# Patient Record
Sex: MALE | Race: WHITE | Employment: UNEMPLOYED | URBAN - METROPOLITAN AREA
[De-identification: names, ages, dates, MRNs, and addresses within clinical notes are randomized per-mention and may not be internally consistent; named-entity substitution may affect disease eponyms.]

---

## 2021-10-31 ENCOUNTER — APPOINTMENT (OUTPATIENT)
Dept: CT IMAGING | Age: 66
End: 2021-10-31

## 2021-10-31 ENCOUNTER — HOSPITAL ENCOUNTER (EMERGENCY)
Age: 66
Discharge: HOME | End: 2021-10-31
Attending: EMERGENCY MEDICINE
Payer: OTHER GOVERNMENT

## 2021-10-31 VITALS
HEART RATE: 73 BPM | OXYGEN SATURATION: 95 % | TEMPERATURE: 97.9 F | SYSTOLIC BLOOD PRESSURE: 123 MMHG | RESPIRATION RATE: 15 BRPM | WEIGHT: 185 LBS | HEIGHT: 70 IN | DIASTOLIC BLOOD PRESSURE: 73 MMHG | BODY MASS INDEX: 26.48 KG/M2

## 2021-10-31 DIAGNOSIS — I67.1 CEREBRAL ANEURYSM: ICD-10-CM

## 2021-10-31 DIAGNOSIS — H53.2 DIPLOPIA: Primary | ICD-10-CM

## 2021-10-31 LAB
A/G RATIO: 1 (ref 1.1–2.2)
ALBUMIN SERPL-MCNC: 3.8 G/DL (ref 3.4–5)
ALP BLD-CCNC: 70 U/L (ref 40–129)
ALT SERPL-CCNC: 20 U/L (ref 10–40)
ANION GAP SERPL CALCULATED.3IONS-SCNC: 11 MMOL/L (ref 3–16)
AST SERPL-CCNC: 26 U/L (ref 15–37)
BASOPHILS ABSOLUTE: 0 K/UL (ref 0–0.2)
BASOPHILS RELATIVE PERCENT: 0.4 %
BILIRUB SERPL-MCNC: <0.2 MG/DL (ref 0–1)
BUN BLDV-MCNC: 17 MG/DL (ref 7–20)
CALCIUM SERPL-MCNC: 9.4 MG/DL (ref 8.3–10.6)
CHLORIDE BLD-SCNC: 99 MMOL/L (ref 99–110)
CO2: 26 MMOL/L (ref 21–32)
CREAT SERPL-MCNC: 0.9 MG/DL (ref 0.8–1.3)
EOSINOPHILS ABSOLUTE: 0.1 K/UL (ref 0–0.6)
EOSINOPHILS RELATIVE PERCENT: 1.2 %
GFR AFRICAN AMERICAN: >60
GFR NON-AFRICAN AMERICAN: >60
GLUCOSE BLD-MCNC: 92 MG/DL (ref 70–99)
HCT VFR BLD CALC: 47.6 % (ref 40.5–52.5)
HEMOGLOBIN: 15.8 G/DL (ref 13.5–17.5)
LYMPHOCYTES ABSOLUTE: 2.2 K/UL (ref 1–5.1)
LYMPHOCYTES RELATIVE PERCENT: 18.7 %
MCH RBC QN AUTO: 30.5 PG (ref 26–34)
MCHC RBC AUTO-ENTMCNC: 33.1 G/DL (ref 31–36)
MCV RBC AUTO: 92.2 FL (ref 80–100)
MONOCYTES ABSOLUTE: 0.7 K/UL (ref 0–1.3)
MONOCYTES RELATIVE PERCENT: 6.4 %
NEUTROPHILS ABSOLUTE: 8.5 K/UL (ref 1.7–7.7)
NEUTROPHILS RELATIVE PERCENT: 73.3 %
PDW BLD-RTO: 13.7 % (ref 12.4–15.4)
PLATELET # BLD: 197 K/UL (ref 135–450)
PMV BLD AUTO: 6.9 FL (ref 5–10.5)
POTASSIUM REFLEX MAGNESIUM: 5 MMOL/L (ref 3.5–5.1)
RBC # BLD: 5.16 M/UL (ref 4.2–5.9)
REASON FOR REJECTION: NORMAL
REJECTED TEST: NORMAL
SODIUM BLD-SCNC: 136 MMOL/L (ref 136–145)
TOTAL PROTEIN: 7.5 G/DL (ref 6.4–8.2)
WBC # BLD: 11.6 K/UL (ref 4–11)

## 2021-10-31 PROCEDURE — 85025 COMPLETE CBC W/AUTO DIFF WBC: CPT

## 2021-10-31 PROCEDURE — 6360000004 HC RX CONTRAST MEDICATION: Performed by: EMERGENCY MEDICINE

## 2021-10-31 PROCEDURE — 99283 EMERGENCY DEPT VISIT LOW MDM: CPT

## 2021-10-31 PROCEDURE — 70450 CT HEAD/BRAIN W/O DYE: CPT

## 2021-10-31 PROCEDURE — 93005 ELECTROCARDIOGRAM TRACING: CPT | Performed by: EMERGENCY MEDICINE

## 2021-10-31 PROCEDURE — 70496 CT ANGIOGRAPHY HEAD: CPT

## 2021-10-31 PROCEDURE — 80053 COMPREHEN METABOLIC PANEL: CPT

## 2021-10-31 RX ORDER — ASPIRIN 81 MG/1
81 TABLET ORAL DAILY
Qty: 30 TABLET | Refills: 0 | Status: SHIPPED | OUTPATIENT
Start: 2021-10-31

## 2021-10-31 RX ORDER — GABAPENTIN 600 MG/1
TABLET ORAL
COMMUNITY
Start: 2021-07-19 | End: 2022-07-20

## 2021-10-31 RX ORDER — OXYCODONE HYDROCHLORIDE 10 MG/1
TABLET ORAL
COMMUNITY
Start: 2021-10-28

## 2021-10-31 RX ORDER — NALOXONE HYDROCHLORIDE 4 MG/.1ML
SPRAY NASAL
Status: ON HOLD | COMMUNITY
Start: 2021-01-20 | End: 2022-01-28 | Stop reason: HOSPADM

## 2021-10-31 RX ORDER — ALBUTEROL SULFATE 90 UG/1
AEROSOL, METERED RESPIRATORY (INHALATION)
COMMUNITY
Start: 2020-11-30

## 2021-10-31 RX ADMIN — IOPAMIDOL 75 ML: 755 INJECTION, SOLUTION INTRAVENOUS at 19:34

## 2021-10-31 ASSESSMENT — PAIN SCALES - GENERAL: PAINLEVEL_OUTOF10: 4

## 2021-10-31 ASSESSMENT — PAIN DESCRIPTION - PAIN TYPE: TYPE: ACUTE PAIN

## 2021-10-31 ASSESSMENT — VISUAL ACUITY
OU: 20/20
OD: 20/40
OS: 20/20

## 2021-10-31 ASSESSMENT — PAIN DESCRIPTION - LOCATION: LOCATION: HEAD

## 2021-10-31 NOTE — ED PROVIDER NOTES
[facility]  ED PROVIDER NOTE    Patient Identification  Pt Name: Magdy Sue  MRN: [id number]  Jaimee 1955  Date of evaluation: 10/31/2021  Provider: Liam Frederick MD  PCP: No primary care provider on file. Chief Complaint  Double vision    HPI  History provided by patient   This is a 78 y.o. male who presents to the ED for double vision for 1 week. He has been using an eye patch on either eye because if he does not causes him to become dizzy. When he uses 1 eye, his vision is normal.  Does not typically use corrective lenses unless he is reading. No nausea or vomiting. No trauma. Not anticoagulated. No numbness or tingling. Ambulating without issue. Never had this before. Presents today because he thought that it would have gone away by now. No pain at this time. ROS  12 systems reviewed, pertinent positives/negatives per HPI otherwise noted to be negative. I have reviewed the following nursing documentation:  Allergies: Hydromorphone, Morphine, and Ciprofloxacin    Past medical history:   Past Medical History:   Diagnosis Date    COPD (chronic obstructive pulmonary disease) ([facility] [address]      Past surgical history: History reviewed. No pertinent surgical history. Home medications:   Previous Medications    ALBUTEROL SULFATE  (90 BASE) MCG/ACT INHALER    INHALE 2 PUFFS BY ORAL INHALATION EVERY 6 HOURS AS NEEDED FOR SHORTNESS OF BREATH. SHAKE WELL; RINSE MOUTHPIECE FREQUENTLY TO PREVENT CLOGGING. FLUTICASONE-SALMETEROL (ADVAIR) 500-50 MCG/DOSE DISKUS INHALER    INHALE 1 PUFF BY ORAL INHALATION TWICE A DAY FOR BREATHING. GARGLE AND RINSE YOUR MOUTH WITH WATER AFTER USING. DO NOT SWALLOW RINSE WATER. **REPLACES SYMBICORT**    GABAPENTIN (NEURONTIN) 600 MG TABLET    TAKE TWO TABLETS BY MOUTH THREE TIMES A DAY DOSE INCREASE    NALOXONE (NARCAN) 4 MG/0.1ML LIQD NASAL SPRAY    SPRAY 1 SPRAY INTO A NOSTRIL AS DIRECTED TO REVERSE OPIOID OVERDOSE.  IF PATIENT DOES NOT RESPOND OR RELAPSES INTO RESPIRATORY DEPRESSION, ADMINISTER ADDITIONAL SINGLE SPRAY INTO OTHER NOSTRIL USING A    OXYCODONE HCL (OXY-IR) 10 MG IMMEDIATE RELEASE TABLET    TAKE ONE TABLET BY MOUTH AS DIRECTED AS NEEDED --- TAKE ONE AND ONE-HALF TABLETS (15MG) EVERY FOUR HOURS. AVOID TAKING WITHIN TWO HOURS OF GABAPENTIN. (FOR 11/03 THROUGH 11/30)       Social history:  reports that he has been smoking cigarettes. He has been smoking about 1.00 pack per day. He does not have any smokeless tobacco history on file. He reports previous alcohol use. He reports current drug use. Drug: Marijuana. Family history:  History reviewed. No pertinent family history. Exam  ED Triage Vitals [10/31/21 1612]   BP Temp Temp Source Pulse Resp SpO2 Height Weight   (!) 155/91 97.9 °F (36.6 °C) Oral 84 14 95 % 5' 10\" (1.778 m) 185 lb (83.9 kg)     Nursing note and vitals reviewed. Constitutional: In no acute distress  HENT:      Head: Normocephalic      Ears: External ears normal.      Nose: Nose normal.     Mouth: Membrane mucosa moist   Eyes: No discharge. Neck: Supple. Trachea midline. Cardiovascular: Regular rate. Warm extremities  Pulmonary/Chest: Effort normal. No respiratory distress. Abdominal: Soft. No distension. Nontender  : Deferred  Rectal: Deferred   Musculoskeletal: Moves all extremities. No gross deformity. Neurological: Alert and oriented. Face symmetric. Speech is clear. CN 2-12 intact. NIHSS 0. Normal visual fields. EOMI. PERRLA. Normal swinging flashlight test. Left eye vision 20/20, right eye vision 20/20  Skin: Warm and dry. Psychiatric: Normal mood and affect. Behavior is normal.    Procedures      EKG  The Ekg interpreted by me in the absence of a cardiologist shows. Normal sinus rhythm. No specific ST changes appreciated. HR 76  No prior EKG for comparison      Radiology  CTA HEAD NECK W CONTRAST   Final Result   1. No large vessel occlusion.   No focal hemodynamically significant stenosis   or occlusion in the large arteries of the head and neck. 2.  Focal 3 mm outpouching along the inferior aspect of the supraclinoid   right intracranial internal carotid artery could represent a small aneurysm   or infundibulum. 3.  Mild fusiform dilatation of the distal right intracranial internal   carotid artery measures up to 4 mm. 4.  Mild atherosclerotic disease. 5.  Homogeneously enhancing 1.5 cm mass in the posterior left parotid gland   may represent a benign parotid neoplasm and could be further characterized   with focused ultrasound. CT HEAD WO CONTRAST   Final Result   Mild to moderate cortical atrophy along the frontal parietal regions and mild   chronic microischemic changes scattered in the deep white matter with no   acute abnormality seen.          MRI BRAIN WO CONTRAST    (Results Pending)       Labs  Results for orders placed or performed during the hospital encounter of 10/31/21   Comprehensive Metabolic Panel w/ Reflex to MG   Result Value Ref Range    Sodium 136 136 - 145 mmol/L    Potassium reflex Magnesium 5.0 3.5 - 5.1 mmol/L    Chloride 99 99 - 110 mmol/L    CO2 26 21 - 32 mmol/L    Anion Gap 11 3 - 16    Glucose 92 70 - 99 mg/dL    BUN 17 7 - 20 mg/dL    CREATININE 0.9 0.8 - 1.3 mg/dL    GFR Non-African American >60 >60    GFR African American >60 >60    Calcium 9.4 8.3 - 10.6 mg/dL    Total Protein 7.5 6.4 - 8.2 g/dL    Albumin 3.8 3.4 - 5.0 g/dL    Albumin/Globulin Ratio 1.0 (L) 1.1 - 2.2    Total Bilirubin <0.2 0.0 - 1.0 mg/dL    Alkaline Phosphatase 70 40 - 129 U/L    ALT 20 10 - 40 U/L    AST 26 15 - 37 U/L   SPECIMEN REJECTION   Result Value Ref Range    Rejected Test CBCWD     Reason for Rejection see below    CBC Auto Differential   Result Value Ref Range    WBC 11.6 (H) 4.0 - 11.0 K/uL    RBC 5.16 4.20 - 5.90 M/uL    Hemoglobin 15.8 13.5 - 17.5 g/dL    Hematocrit 47.6 40.5 - 52.5 %    MCV 92.2 80.0 - 100.0 fL    MCH 30.5 26.0 - 34.0 pg    MCHC 33.1 31.0 - 36.0 g/dL    RDW 13.7 12.4 - 15.4 %    Platelets 690 518 - 295 K/uL    MPV 6.9 5.0 - 10.5 fL    Neutrophils % 73.3 %    Lymphocytes % 18.7 %    Monocytes % 6.4 %    Eosinophils % 1.2 %    Basophils % 0.4 %    Neutrophils Absolute 8.5 (H) 1.7 - 7.7 K/uL    Lymphocytes Absolute 2.2 1.0 - 5.1 K/uL    Monocytes Absolute 0.7 0.0 - 1.3 K/uL    Eosinophils Absolute 0.1 0.0 - 0.6 K/uL    Basophils Absolute 0.0 0.0 - 0.2 K/uL   EKG 12 Lead   Result Value Ref Range    Ventricular Rate 76 BPM    Atrial Rate 76 BPM    P-R Interval 152 ms    QRS Duration 88 ms    Q-T Interval 364 ms    QTc Calculation (Bazett) 409 ms    P Axis 61 degrees    R Axis -4 degrees    T Axis 27 degrees    Diagnosis       Normal sinus rhythmNormal ECGNo previous ECGs available       Screenings  NIH Stroke Scale  Interval: Baseline  Level of Consciousness (1a. ): Alert  LOC Questions (1b. ): Answers both correctly  LOC Commands (1c. ): Performs both tasks correctly  Best Gaze (2. ): Normal  Visual (3. ): No visual loss (double vision)  Facial Palsy (4. ): Normal symmetrical movement  Motor Arm, Left (5a. ): No drift  Motor Arm, Right (5b. ): No drift  Motor Leg, Left (6a. ): No drift  Motor Leg, Right (6b. ): No drift  Limb Ataxia (7. ): Absent  Sensory (8. ): Normal  Best Language (9. ): No aphasia  Dysarthria (10. ): Normal  Extinction and Inattention (11): No abnormality  Total: 0        MDM and ED Course  This is a 78 y.o. male who presents to the ED for diplopia ongoing for 1 week. Because of timing, not candidate for TPA and stroke alert was not called. NIH stroke scale 0. Vision normal in each eye and symptoms gone when 1 eyes closed therefore I have lower suspicion that this is a globe issue. CT CTA head and neck taken which did show incidental findings of aneurysm and a mass near the parotid. Patient not currently having any pain. Believe that he can follow-up with a neurosurgeon and family doctor for these issues.   I did consult  Mitzi at around 9 PM.  Given time course and symptoms, he believes that patient does not need inpatient evaluation for stroke. He recommended patient follow-up with an ophthalmologist as well as receive an MRI. Discussed this with the patient as well as his family at bedside and they are happy with this plan. All questions answered and return precautions given. [unfilled]    Final Impression  1. Diplopia    2. Cerebral aneurysm        Blood pressure 124/89, pulse 84, temperature 97.9 °F (36.6 °C), temperature source Oral, resp. rate 14, height 5' 10\" (1.778 m), weight 185 lb (83.9 kg), SpO2 95 %. Disposition:  DISPOSITION Decision To Discharge 10/31/2021 09:49:46 PM      Patient Referrals:  Kaden Jackson MD  [address] [place] [address], [facility]/ [facility] [phone]    Call in 1 day      Trupti Cardenas MD  [address] [place] [address] [address] [facility]  [phone]    Call in 1 day      [address] [facility] [address] [phone]    Call in 1 day      [facility]) Pre-Services  [phone]  Call in 1 day        Discharge Medications:  New Prescriptions    ASPIRIN EC 81 MG EC TABLET    Take 1 tablet by mouth daily       Discontinued Medications:  Discontinued Medications    No medications on file       This chart was generated using the [address] [place] [address] dictation system. I created this record but it may contain dictation errors given the limitations of this technology.         Justice Sandoval MD  10/31/21 0609

## 2021-11-01 LAB
EKG ATRIAL RATE: 76 BPM
EKG DIAGNOSIS: NORMAL
EKG P AXIS: 61 DEGREES
EKG P-R INTERVAL: 152 MS
EKG Q-T INTERVAL: 364 MS
EKG QRS DURATION: 88 MS
EKG QTC CALCULATION (BAZETT): 409 MS
EKG R AXIS: -4 DEGREES
EKG T AXIS: 27 DEGREES
EKG VENTRICULAR RATE: 76 BPM

## 2021-11-01 PROCEDURE — 93010 ELECTROCARDIOGRAM REPORT: CPT | Performed by: INTERNAL MEDICINE

## 2021-11-01 NOTE — ED NOTES
Provided d/c instructions and follow up plan of care. Verbalizes understanding of need to follow up and when.  Ambulated out with family gait steady      Jeannine LoftonTitusville [facility] Hospital  10/31/21 3517

## 2022-01-18 ENCOUNTER — HOSPITAL ENCOUNTER (INPATIENT)
Age: 67
LOS: 11 days | Discharge: HOME HEALTH | DRG: 871 | End: 2022-01-29
Attending: EMERGENCY MEDICINE
Payer: OTHER GOVERNMENT

## 2022-01-18 ENCOUNTER — APPOINTMENT (OUTPATIENT)
Dept: GENERAL RADIOLOGY | Age: 67
DRG: 871 | End: 2022-01-18
Payer: OTHER GOVERNMENT

## 2022-01-18 DIAGNOSIS — U07.1 ACUTE HYPOXEMIC RESPIRATORY FAILURE DUE TO COVID-19 (HCC): Primary | ICD-10-CM

## 2022-01-18 DIAGNOSIS — J96.01 ACUTE HYPOXEMIC RESPIRATORY FAILURE DUE TO COVID-19 (HCC): Primary | ICD-10-CM

## 2022-01-18 PROBLEM — R06.82 TACHYPNEA: Status: ACTIVE | Noted: 2022-01-18

## 2022-01-18 PROBLEM — R00.0 TACHYCARDIA: Status: ACTIVE | Noted: 2022-01-18

## 2022-01-18 PROBLEM — R79.89 ELEVATED LACTIC ACID LEVEL: Status: ACTIVE | Noted: 2022-01-18

## 2022-01-18 PROBLEM — J18.9 PNEUMONIA: Status: ACTIVE | Noted: 2022-01-18

## 2022-01-18 PROBLEM — A41.9 SEPSIS (HCC): Status: ACTIVE | Noted: 2022-01-18

## 2022-01-18 PROBLEM — J44.9 COPD (CHRONIC OBSTRUCTIVE PULMONARY DISEASE) (HCC): Status: ACTIVE | Noted: 2022-01-18

## 2022-01-18 LAB
A/G RATIO: 0.7 (ref 1.1–2.2)
ALBUMIN SERPL-MCNC: 3 G/DL (ref 3.4–5)
ALP BLD-CCNC: 48 U/L (ref 40–129)
ALT SERPL-CCNC: 29 U/L (ref 10–40)
ANION GAP SERPL CALCULATED.3IONS-SCNC: 14 MMOL/L (ref 3–16)
AST SERPL-CCNC: 35 U/L (ref 15–37)
BASE EXCESS VENOUS: -0.8 MMOL/L (ref -3–3)
BASOPHILS ABSOLUTE: 0 K/UL (ref 0–0.2)
BASOPHILS RELATIVE PERCENT: 0.3 %
BILIRUB SERPL-MCNC: 0.7 MG/DL (ref 0–1)
BUN BLDV-MCNC: 14 MG/DL (ref 7–20)
CALCIUM SERPL-MCNC: 8.7 MG/DL (ref 8.3–10.6)
CARBOXYHEMOGLOBIN: 3.6 % (ref 0–1.5)
CHLORIDE BLD-SCNC: 100 MMOL/L (ref 99–110)
CO2: 21 MMOL/L (ref 21–32)
CREAT SERPL-MCNC: 0.6 MG/DL (ref 0.8–1.3)
EOSINOPHILS ABSOLUTE: 0 K/UL (ref 0–0.6)
EOSINOPHILS RELATIVE PERCENT: 0.2 %
GFR AFRICAN AMERICAN: >60
GFR NON-AFRICAN AMERICAN: >60
GLUCOSE BLD-MCNC: 146 MG/DL (ref 70–99)
HCO3 VENOUS: 21.4 MMOL/L (ref 23–29)
HCT VFR BLD CALC: 42.3 % (ref 40.5–52.5)
HEMOGLOBIN: 13.9 G/DL (ref 13.5–17.5)
LACTIC ACID: 1.6 MMOL/L (ref 0.4–2)
LACTIC ACID: 2.7 MMOL/L (ref 0.4–2)
LYMPHOCYTES ABSOLUTE: 0.5 K/UL (ref 1–5.1)
LYMPHOCYTES RELATIVE PERCENT: 6.1 %
MCH RBC QN AUTO: 29.4 PG (ref 26–34)
MCHC RBC AUTO-ENTMCNC: 32.8 G/DL (ref 31–36)
MCV RBC AUTO: 89.6 FL (ref 80–100)
METHEMOGLOBIN VENOUS: 0.3 %
MONOCYTES ABSOLUTE: 0.7 K/UL (ref 0–1.3)
MONOCYTES RELATIVE PERCENT: 7.8 %
NEUTROPHILS ABSOLUTE: 7.6 K/UL (ref 1.7–7.7)
NEUTROPHILS RELATIVE PERCENT: 85.6 %
O2 SAT, VEN: 98 %
O2 THERAPY: ABNORMAL
PCO2, VEN: 29.4 MMHG (ref 40–50)
PDW BLD-RTO: 13.5 % (ref 12.4–15.4)
PH VENOUS: 7.48 (ref 7.35–7.45)
PLATELET # BLD: 299 K/UL (ref 135–450)
PMV BLD AUTO: 7.1 FL (ref 5–10.5)
PO2, VEN: 117.9 MMHG (ref 25–40)
POTASSIUM SERPL-SCNC: 4.5 MMOL/L (ref 3.5–5.1)
PRO-BNP: 138 PG/ML (ref 0–124)
PROCALCITONIN: 0.09 NG/ML (ref 0–0.15)
RAPID INFLUENZA  B AGN: NEGATIVE
RAPID INFLUENZA A AGN: NEGATIVE
RBC # BLD: 4.72 M/UL (ref 4.2–5.9)
SARS-COV-2, NAAT: NOT DETECTED
SODIUM BLD-SCNC: 135 MMOL/L (ref 136–145)
TCO2 CALC VENOUS: 22 MMOL/L
TOTAL PROTEIN: 7.4 G/DL (ref 6.4–8.2)
TROPONIN: <0.01 NG/ML
WBC # BLD: 8.9 K/UL (ref 4–11)

## 2022-01-18 PROCEDURE — 99284 EMERGENCY DEPT VISIT MOD MDM: CPT

## 2022-01-18 PROCEDURE — 96374 THER/PROPH/DIAG INJ IV PUSH: CPT

## 2022-01-18 PROCEDURE — 94761 N-INVAS EAR/PLS OXIMETRY MLT: CPT

## 2022-01-18 PROCEDURE — 94640 AIRWAY INHALATION TREATMENT: CPT

## 2022-01-18 PROCEDURE — U0005 INFEC AGEN DETEC AMPLI PROBE: HCPCS

## 2022-01-18 PROCEDURE — 87804 INFLUENZA ASSAY W/OPTIC: CPT

## 2022-01-18 PROCEDURE — 6370000000 HC RX 637 (ALT 250 FOR IP): Performed by: INTERNAL MEDICINE

## 2022-01-18 PROCEDURE — 83605 ASSAY OF LACTIC ACID: CPT

## 2022-01-18 PROCEDURE — 87040 BLOOD CULTURE FOR BACTERIA: CPT

## 2022-01-18 PROCEDURE — 6360000002 HC RX W HCPCS: Performed by: EMERGENCY MEDICINE

## 2022-01-18 PROCEDURE — 80053 COMPREHEN METABOLIC PANEL: CPT

## 2022-01-18 PROCEDURE — 93005 ELECTROCARDIOGRAM TRACING: CPT | Performed by: EMERGENCY MEDICINE

## 2022-01-18 PROCEDURE — U0003 INFECTIOUS AGENT DETECTION BY NUCLEIC ACID (DNA OR RNA); SEVERE ACUTE RESPIRATORY SYNDROME CORONAVIRUS 2 (SARS-COV-2) (CORONAVIRUS DISEASE [COVID-19]), AMPLIFIED PROBE TECHNIQUE, MAKING USE OF HIGH THROUGHPUT TECHNOLOGIES AS DESCRIBED BY CMS-2020-01-R: HCPCS

## 2022-01-18 PROCEDURE — 84484 ASSAY OF TROPONIN QUANT: CPT

## 2022-01-18 PROCEDURE — 86140 C-REACTIVE PROTEIN: CPT

## 2022-01-18 PROCEDURE — 83880 ASSAY OF NATRIURETIC PEPTIDE: CPT

## 2022-01-18 PROCEDURE — 87635 SARS-COV-2 COVID-19 AMP PRB: CPT

## 2022-01-18 PROCEDURE — 6370000000 HC RX 637 (ALT 250 FOR IP): Performed by: EMERGENCY MEDICINE

## 2022-01-18 PROCEDURE — 6360000002 HC RX W HCPCS: Performed by: INTERNAL MEDICINE

## 2022-01-18 PROCEDURE — 2700000000 HC OXYGEN THERAPY PER DAY

## 2022-01-18 PROCEDURE — 82803 BLOOD GASES ANY COMBINATION: CPT

## 2022-01-18 PROCEDURE — 2580000003 HC RX 258: Performed by: EMERGENCY MEDICINE

## 2022-01-18 PROCEDURE — 36415 COLL VENOUS BLD VENIPUNCTURE: CPT

## 2022-01-18 PROCEDURE — 84145 PROCALCITONIN (PCT): CPT

## 2022-01-18 PROCEDURE — 96361 HYDRATE IV INFUSION ADD-ON: CPT

## 2022-01-18 PROCEDURE — 71045 X-RAY EXAM CHEST 1 VIEW: CPT

## 2022-01-18 PROCEDURE — 1200000000 HC SEMI PRIVATE

## 2022-01-18 PROCEDURE — 2580000003 HC RX 258: Performed by: INTERNAL MEDICINE

## 2022-01-18 PROCEDURE — 85025 COMPLETE CBC W/AUTO DIFF WBC: CPT

## 2022-01-18 RX ORDER — LANOLIN ALCOHOL/MO/W.PET/CERES
200 CREAM (GRAM) TOPICAL DAILY
Status: DISCONTINUED | OUTPATIENT
Start: 2022-01-19 | End: 2022-01-29 | Stop reason: HOSPADM

## 2022-01-18 RX ORDER — FAMOTIDINE 20 MG/1
20 TABLET, FILM COATED ORAL 2 TIMES DAILY
Status: DISCONTINUED | OUTPATIENT
Start: 2022-01-18 | End: 2022-01-29 | Stop reason: HOSPADM

## 2022-01-18 RX ORDER — DEXAMETHASONE 4 MG/1
6 TABLET ORAL DAILY
Status: DISCONTINUED | OUTPATIENT
Start: 2022-01-19 | End: 2022-01-19

## 2022-01-18 RX ORDER — ALBUTEROL SULFATE 90 UG/1
2 AEROSOL, METERED RESPIRATORY (INHALATION) 4 TIMES DAILY
Status: DISCONTINUED | OUTPATIENT
Start: 2022-01-18 | End: 2022-01-18

## 2022-01-18 RX ORDER — ASPIRIN 81 MG/1
81 TABLET ORAL DAILY
Status: DISCONTINUED | OUTPATIENT
Start: 2022-01-18 | End: 2022-01-29 | Stop reason: HOSPADM

## 2022-01-18 RX ORDER — SODIUM CHLORIDE 0.9 % (FLUSH) 0.9 %
5-40 SYRINGE (ML) INJECTION EVERY 12 HOURS SCHEDULED
Status: DISCONTINUED | OUTPATIENT
Start: 2022-01-18 | End: 2022-01-29 | Stop reason: HOSPADM

## 2022-01-18 RX ORDER — ACETAMINOPHEN 325 MG/1
650 TABLET ORAL EVERY 6 HOURS PRN
Status: DISCONTINUED | OUTPATIENT
Start: 2022-01-18 | End: 2022-01-29 | Stop reason: HOSPADM

## 2022-01-18 RX ORDER — OXYCODONE HYDROCHLORIDE 5 MG/1
15 TABLET ORAL EVERY 4 HOURS PRN
Status: DISCONTINUED | OUTPATIENT
Start: 2022-01-18 | End: 2022-01-29 | Stop reason: HOSPADM

## 2022-01-18 RX ORDER — SODIUM CHLORIDE 9 MG/ML
25 INJECTION, SOLUTION INTRAVENOUS PRN
Status: DISCONTINUED | OUTPATIENT
Start: 2022-01-18 | End: 2022-01-29 | Stop reason: HOSPADM

## 2022-01-18 RX ORDER — DEXAMETHASONE SODIUM PHOSPHATE 4 MG/ML
4 INJECTION, SOLUTION INTRA-ARTICULAR; INTRALESIONAL; INTRAMUSCULAR; INTRAVENOUS; SOFT TISSUE ONCE
Status: COMPLETED | OUTPATIENT
Start: 2022-01-18 | End: 2022-01-18

## 2022-01-18 RX ORDER — ACETAMINOPHEN 650 MG/1
650 SUPPOSITORY RECTAL EVERY 6 HOURS PRN
Status: DISCONTINUED | OUTPATIENT
Start: 2022-01-18 | End: 2022-01-29 | Stop reason: HOSPADM

## 2022-01-18 RX ORDER — ALBUTEROL SULFATE 90 UG/1
2 AEROSOL, METERED RESPIRATORY (INHALATION) EVERY 4 HOURS PRN
Status: DISCONTINUED | OUTPATIENT
Start: 2022-01-18 | End: 2022-01-29 | Stop reason: HOSPADM

## 2022-01-18 RX ORDER — POLYETHYLENE GLYCOL 3350 17 G/17G
17 POWDER, FOR SOLUTION ORAL DAILY PRN
Status: DISCONTINUED | OUTPATIENT
Start: 2022-01-18 | End: 2022-01-29 | Stop reason: HOSPADM

## 2022-01-18 RX ORDER — LANOLIN ALCOHOL/MO/W.PET/CERES
3 CREAM (GRAM) TOPICAL NIGHTLY
Status: DISCONTINUED | OUTPATIENT
Start: 2022-01-18 | End: 2022-01-29 | Stop reason: HOSPADM

## 2022-01-18 RX ORDER — PROCHLORPERAZINE EDISYLATE 5 MG/ML
10 INJECTION INTRAMUSCULAR; INTRAVENOUS EVERY 6 HOURS PRN
Status: DISCONTINUED | OUTPATIENT
Start: 2022-01-18 | End: 2022-01-29 | Stop reason: HOSPADM

## 2022-01-18 RX ORDER — 0.9 % SODIUM CHLORIDE 0.9 %
1000 INTRAVENOUS SOLUTION INTRAVENOUS ONCE
Status: COMPLETED | OUTPATIENT
Start: 2022-01-18 | End: 2022-01-18

## 2022-01-18 RX ORDER — NICOTINE 21 MG/24HR
1 PATCH, TRANSDERMAL 24 HOURS TRANSDERMAL DAILY
Status: DISCONTINUED | OUTPATIENT
Start: 2022-01-18 | End: 2022-01-18

## 2022-01-18 RX ORDER — BENZONATATE 100 MG/1
200 CAPSULE ORAL 3 TIMES DAILY PRN
Status: DISCONTINUED | OUTPATIENT
Start: 2022-01-18 | End: 2022-01-29 | Stop reason: HOSPADM

## 2022-01-18 RX ORDER — NICOTINE 21 MG/24HR
1 PATCH, TRANSDERMAL 24 HOURS TRANSDERMAL ONCE
Status: COMPLETED | OUTPATIENT
Start: 2022-01-18 | End: 2022-01-19

## 2022-01-18 RX ORDER — SODIUM CHLORIDE 0.9 % (FLUSH) 0.9 %
5-40 SYRINGE (ML) INJECTION PRN
Status: DISCONTINUED | OUTPATIENT
Start: 2022-01-18 | End: 2022-01-29 | Stop reason: HOSPADM

## 2022-01-18 RX ORDER — BUDESONIDE AND FORMOTEROL FUMARATE DIHYDRATE 160; 4.5 UG/1; UG/1
2 AEROSOL RESPIRATORY (INHALATION) 2 TIMES DAILY
Status: DISCONTINUED | OUTPATIENT
Start: 2022-01-18 | End: 2022-01-29 | Stop reason: HOSPADM

## 2022-01-18 RX ORDER — NICOTINE 21 MG/24HR
1 PATCH, TRANSDERMAL 24 HOURS TRANSDERMAL DAILY
Status: DISCONTINUED | OUTPATIENT
Start: 2022-01-19 | End: 2022-01-29 | Stop reason: HOSPADM

## 2022-01-18 RX ADMIN — DEXAMETHASONE SODIUM PHOSPHATE 4 MG: 4 INJECTION, SOLUTION INTRAMUSCULAR; INTRAVENOUS at 16:27

## 2022-01-18 RX ADMIN — OXYCODONE HYDROCHLORIDE 15 MG: 5 TABLET ORAL at 18:40

## 2022-01-18 RX ADMIN — SODIUM CHLORIDE 1000 ML: 9 INJECTION, SOLUTION INTRAVENOUS at 16:25

## 2022-01-18 RX ADMIN — AZITHROMYCIN MONOHYDRATE 500 MG: 500 INJECTION, POWDER, LYOPHILIZED, FOR SOLUTION INTRAVENOUS at 17:38

## 2022-01-18 RX ADMIN — CEFTRIAXONE SODIUM 1000 MG: 1 INJECTION, POWDER, FOR SOLUTION INTRAMUSCULAR; INTRAVENOUS at 18:12

## 2022-01-18 RX ADMIN — OXYCODONE HYDROCHLORIDE 15 MG: 5 TABLET ORAL at 22:47

## 2022-01-18 RX ADMIN — ASPIRIN 81 MG: 81 TABLET, COATED ORAL at 20:34

## 2022-01-18 RX ADMIN — Medication 2 PUFF: at 20:04

## 2022-01-18 ASSESSMENT — PAIN SCALES - GENERAL
PAINLEVEL_OUTOF10: 7
PAINLEVEL_OUTOF10: 0
PAINLEVEL_OUTOF10: 6

## 2022-01-18 NOTE — ED PROVIDER NOTES
CHIEF COMPLAINT  Shortness of Breath (SOB over the past two weeks. worse with exertion. 83% on RA upon arrival. Placed on 3 liters and sats increased to 90% )      HISTORY OF PRESENT ILLNESS  Margaret Casas is a 78 y.o. male with a history of COPD, HCV, 1 pack/day smoker who presents to the ED complaining of dyspnea. Patient states he has been short of breath for the past 2 weeks with occasional nonproductive cough, nausea, several episodes of nonbloody emesis. Has not been evaluated prior to today. Patient is unvaccinated. Does not wear oxygen at baseline. O2 sats on arrival 83% on room air. Denies chest pain, diaphoresis, syncope, abdominal pain, diarrhea, dysuria, rash. Unaware of any sick contacts. No other complaints, modifying factors or associated symptoms. I have reviewed the following from the nursing documentation. Past Medical History:   Diagnosis Date    COPD (chronic obstructive pulmonary disease) ([facility] [address]      History reviewed. No pertinent surgical history. History reviewed. No pertinent family history.   Social History     Socioeconomic History    Marital status:      Spouse name: Not on file    Number of children: Not on file    Years of education: Not on file    Highest education level: Not on file   Occupational History    Not on file   Tobacco Use    Smoking status: Current Every Day Smoker     Packs/day: 1.00     Types: Cigarettes    Smokeless tobacco: Never Used   Substance and Sexual Activity    Alcohol use: Not Currently    Drug use: Yes     Types: Marijuana Eudelia Alexa)     Comment: \"sometimes\"    Sexual activity: Not on file   Other Topics Concern    Not on file   Social History Narrative    Not on file     Social Determinants of Health     Financial Resource Strain:     Difficulty of Paying Living Expenses: Not on file   Food Insecurity:     Worried About Running Out of Food in the Last Year: Not on file    Hector of Food in the Last Year: Not on file Transportation Needs:     Lack of Transportation (Medical): Not on file    Lack of Transportation (Non-Medical): Not on file   Physical Activity:     Days of Exercise per Week: Not on file    Minutes of Exercise per Session: Not on file   Stress:     Feeling of Stress : Not on file   Social Connections:     Frequency of Communication with Friends and Family: Not on file    Frequency of Social Gatherings with Friends and Family: Not on file    Attends [employer] Services: Not on file    Active Member of Clubs or Organizations: Not on file    Attends Club or Organization Meetings: Not on file    Marital Status: Not on file   Intimate Partner Violence:     Fear of Current or Ex-Partner: Not on file    Emotionally Abused: Not on file    Physically Abused: Not on file    Sexually Abused: Not on file   Housing Stability:     Unable to Pay for Housing in the Last Year: Not on file    Number of [place] in the Last Year: Not on file    Unstable Housing in the Last Year: Not on file     Current Facility-Administered Medications   Medication Dose Route Frequency Provider Last Rate Last Admin    Dexamethasone Sodium Phosphate injection 4 mg  4 mg IntraVENous Once Taylor Lima MD        0.9 % sodium chloride bolus  1,000 mL IntraVENous Once Taylor Lima MD        nicotine (NICODERM CQ) 14 MG/24HR 1 patch  1 patch TransDERmal Once Taylor Lima MD         Current Outpatient Medications   Medication Sig Dispense Refill    albuterol sulfate  (90 Base) MCG/ACT inhaler INHALE 2 PUFFS BY ORAL INHALATION EVERY 6 HOURS AS NEEDED FOR SHORTNESS OF BREATH. SHAKE WELL; RINSE MOUTHPIECE FREQUENTLY TO PREVENT CLOGGING.  gabapentin (NEURONTIN) 600 MG tablet TAKE TWO TABLETS BY MOUTH THREE TIMES A DAY DOSE INCREASE      fluticasone-salmeterol (ADVAIR) 500-50 MCG/DOSE diskus inhaler INHALE 1 PUFF BY ORAL INHALATION TWICE A DAY FOR BREATHING.   GARGLE AND RINSE YOUR MOUTH WITH WATER AFTER USING. DO NOT SWALLOW RINSE WATER. **REPLACES SYMBICORT**      oxyCODONE HCl (OXY-IR) 10 MG immediate release tablet TAKE ONE TABLET BY MOUTH AS DIRECTED AS NEEDED --- TAKE ONE AND ONE-HALF TABLETS (15MG) EVERY FOUR HOURS. AVOID TAKING WITHIN TWO HOURS OF GABAPENTIN. (FOR 11/03 THROUGH 11/30)      naloxone (NARCAN) 4 MG/0.1ML LIQD nasal spray SPRAY 1 SPRAY INTO A NOSTRIL AS DIRECTED TO REVERSE OPIOID OVERDOSE. IF PATIENT DOES NOT RESPOND OR RELAPSES INTO RESPIRATORY DEPRESSION, ADMINISTER ADDITIONAL SINGLE SPRAY INTO OTHER NOSTRIL USING A      aspirin EC 81 MG EC tablet Take 1 tablet by mouth daily 30 tablet 0     Allergies   Allergen Reactions    Hydromorphone      Other reaction(s): Urticaria    Morphine      Other reaction(s): Dysuria, Headache, Retention of urine    Ciprofloxacin Rash and Hives     Other reaction(s): Eruption of skin, Urticaria       REVIEW OF SYSTEMS  10 systems reviewed, pertinent positives per HPI otherwise noted to be negative. PHYSICAL EXAM  /70   Pulse 120   Temp 97.9 °F (36.6 °C) (Oral)   Resp 28   Ht 5' 10\" (1.778 m)   Wt 200 lb (90.7 kg)   SpO2 90%   BMI 28.70 kg/m²   GENERAL APPEARANCE: Awake and alert. Cooperative. No acute distress. HEAD: Normocephalic. Atraumatic. EYES: PERRL. EOM's grossly intact. ENT: Mucous membranes are moist.   NECK: Supple, trachea midline. HEART: RR rate 114. Normal S1, S2. No murmurs, rubs or gallops. LUNGS: Respirations unlabored. Moderate air movement, scattered coarse breath sounds bilaterally, no coughing observed. No wheezing. Speaking comfortably in full sentences. ABDOMEN: Soft. Non-distended. Non-tender. No guarding or rebound. Normal Bowel sounds. EXTREMITIES: No peripheral edema. MAEE. No acute deformities. SKIN: Warm and dry. No acute rashes. NEUROLOGICAL: Alert and oriented X 3. CN II-XII intact. No gross facial drooping. Strength symmetrical.   PSYCHIATRIC: Normal mood and affect.     LABS  I have reviewed all labs for this visit.    Results for orders placed or performed during the hospital encounter of 01/18/22   COVID-19, Rapid    Specimen: Nasopharyngeal Swab   Result Value Ref Range    SARS-CoV-2, NAAT Not Detected Not Detected   CBC auto differential   Result Value Ref Range    WBC 8.9 4.0 - 11.0 K/uL    RBC 4.72 4.20 - 5.90 M/uL    Hemoglobin 13.9 13.5 - 17.5 g/dL    Hematocrit 42.3 40.5 - 52.5 %    MCV 89.6 80.0 - 100.0 fL    MCH 29.4 26.0 - 34.0 pg    MCHC 32.8 31.0 - 36.0 g/dL    RDW 13.5 12.4 - 15.4 %    Platelets 972 642 - 858 K/uL    MPV 7.1 5.0 - 10.5 fL    Neutrophils % 85.6 %    Lymphocytes % 6.1 %    Monocytes % 7.8 %    Eosinophils % 0.2 %    Basophils % 0.3 %    Neutrophils Absolute 7.6 1.7 - 7.7 K/uL    Lymphocytes Absolute 0.5 (L) 1.0 - 5.1 K/uL    Monocytes Absolute 0.7 0.0 - 1.3 K/uL    Eosinophils Absolute 0.0 0.0 - 0.6 K/uL    Basophils Absolute 0.0 0.0 - 0.2 K/uL   Comprehensive metabolic panel   Result Value Ref Range    Sodium 135 (L) 136 - 145 mmol/L    Potassium 4.5 3.5 - 5.1 mmol/L    Chloride 100 99 - 110 mmol/L    CO2 21 21 - 32 mmol/L    Anion Gap 14 3 - 16    Glucose 146 (H) 70 - 99 mg/dL    BUN 14 7 - 20 mg/dL    CREATININE 0.6 (L) 0.8 - 1.3 mg/dL    GFR Non-African American >60 >60    GFR African American >60 >60    Calcium 8.7 8.3 - 10.6 mg/dL    Total Protein 7.4 6.4 - 8.2 g/dL    Albumin 3.0 (L) 3.4 - 5.0 g/dL    Albumin/Globulin Ratio 0.7 (L) 1.1 - 2.2    Total Bilirubin 0.7 0.0 - 1.0 mg/dL    Alkaline Phosphatase 48 40 - 129 U/L    ALT 29 10 - 40 U/L    AST 35 15 - 37 U/L   Troponin   Result Value Ref Range    Troponin <0.01 <0.01 ng/mL   Lactic acid, plasma   Result Value Ref Range    Lactic Acid 2.7 (H) 0.4 - 2.0 mmol/L   Blood gas, venous   Result Value Ref Range    pH, Gage 7.480 (H) 7.350 - 7.450    pCO2, Gage 29.4 (L) 40.0 - 50.0 mmHg    pO2, Gage 117.9 (H) 25.0 - 40.0 mmHg    HCO3, Venous 21.4 (L) 23.0 - 29.0 mmol/L    Base Excess, Gage -0.8 -3.0 - 3.0 mmol/L    O2 Sat, Gage 98 Not Established %    Carboxyhemoglobin 3.6 (H) 0.0 - 1.5 %    MetHgb, Gage 0.3 <1.5 %    TC02 (Calc), Gage 22 Not Established mmol/L    O2 Therapy Unknown    EKG 12 Lead   Result Value Ref Range    Ventricular Rate 109 BPM    Atrial Rate 109 BPM    P-R Interval 132 ms    QRS Duration 86 ms    Q-T Interval 334 ms    QTc Calculation (Bazett) 449 ms    P Axis 37 degrees    R Axis -17 degrees    T Axis 21 degrees    Diagnosis       Sinus tachycardiaOtherwise normal ECGWhen compared with ECG of 31-OCT-2021 16:57,No significant change was found       EKG  Sinus tachycardia, rate 109, leftward axis, QTC within normal limits, no acute ST or T wave changes from prior on 10/31/2021      RADIOLOGY  X-RAYS:  I have reviewed radiologic plain film image(s). ALL OTHER NON-PLAIN FILM IMAGES SUCH AS CT, ULTRASOUND AND MRI HAVE BEEN READ BY THE RADIOLOGIST. XR CHEST PORTABLE   Final Result   Bilateral pulmonary opacification concerning for multifocal pneumonia   including COVID-19 pneumonia. Critical Care: 30min. Acute hypoxemic respiratory failure necessitating supplemental oxygen and IV steroids. ED COURSE/MDM  Patient seen and evaluated. Old records reviewed. Labs and imaging reviewed and results discussed with patient. Patient with dyspnea and hypoxia, has been ill for the past 2 weeks. Has scattered bilateral rales on auscultation and chest x-ray consistent with COVID-19. I believe his rapid test was a false negative. He is newly oxygen dependent and will be admitted to the hospital.  Typically cared for by the [address] [place] [address] however states he prefers to stay here. New Prescriptions    No medications on file       CLINICAL IMPRESSION  1. Acute hypoxemic respiratory failure due to COVID-19 [facility])        Blood pressure 111/70, pulse 120, temperature 97.9 °F (36.6 °C), temperature source Oral, resp. rate 28, height 5' 10\" (1.778 m), weight 200 lb (90.7 kg), SpO2 90 %.     DISPOSITION  Davon Delaney Vimalquan Spear was admitted in stable condition.       Ashley Ennis MD  01/18/22 8527

## 2022-01-18 NOTE — ED NOTES
Report called to nurse assuming inpatient care and responsibility for patient. All questions answered per protocol. Nurse receiving report expresses no further concerns or questions. Transport placed.       Marli Shi RN  01/18/22 3698

## 2022-01-18 NOTE — ACP (ADVANCE CARE PLANNING)
Advance Care Planning     General Advance Care Planning (ACP) Conversation    Date of Conversation: 1/18/2022  Conducted with: Patient with Decision Making Capacity    Healthcare Decision Maker:    Primary Decision Maker: Ace Hawkins - Child - [phone]  Click here to complete [address] including selection of the Healthcare Decision Maker Relationship (ie \"Primary\"). Today we documented Decision Maker(s) consistent with Legal Next of Kin hierarchy.     Content/Action Overview:  Has NO ACP documents/care preferences - information provided, considering goals and options  Reviewed DNR/DNI and patient elects Full Code (Attempt Resuscitation)  ventilation preferences  would want a ventilator    Length of Voluntary ACP Conversation in minutes:  <16 minutes (Non-Billable)    JOVAN Aguilar    Electronically signed by JOVAN Aguilar, CHEYANNEW-S on 1/18/2022 at 5:28 PM

## 2022-01-18 NOTE — CARE COORDINATION
CASE MANAGEMENT INITIAL ASSESSMENT      Reviewed chart and completed assessment with patient:yes  Explained Case Management role/services. yes    Primary contact information:son, [facility] Decision Maker :   Primary Decision Maker: Ace Hawkins - Child - [phone]          Can this person be reached and be able to respond quickly, such as within a few minutes or hours? Yes  Who would be your back-up decision maker? Name ex wife, Paula Generous  Phone Gladystine Seip date/status:1/18/22  Diagnosis:COPD   Is this a Readmission?:  No      Insurance:medicare and VA   Precert required for SNF: Yes       3 night stay required: No    Living arrangements, Adls, care needs, prior to admission:home with son, Independent in ADLs    Transportation:car     Durable Medical Equipment at home:  Walker__Cane__RTS__ BSC__Shower Chair__  02__ HHN__ CPAP__  BiPap__  Hospital Bed__ W/C___ Other__________    Services in the home and/or outpatient, prior to [address] (if applicable)   · Name:  · Address:  · Dialysis Schedule:  · Phone:  · Fax:    PT/OT recs:    Hospital Exemption Notification (HEN):    Barriers to discharge:medical complications    Plan/comments:Referred to patient for d/c planning. Spoke to patient. Patient is a 78year old male admitted for COPD. Patient usually lives at home with son. Patient reports he is independent in ADLs. Patient denies d/c needs at this time.  Electronically signed by JOVAN Grajeda LISW-S on 1/18/2022 at 5:31 PM      [facility] on chart for MD signature

## 2022-01-19 LAB
ANION GAP SERPL CALCULATED.3IONS-SCNC: 10 MMOL/L (ref 3–16)
BUN BLDV-MCNC: 11 MG/DL (ref 7–20)
C-REACTIVE PROTEIN: 100.4 MG/L (ref 0–5.1)
CALCIUM SERPL-MCNC: 8.4 MG/DL (ref 8.3–10.6)
CHLORIDE BLD-SCNC: 106 MMOL/L (ref 99–110)
CO2: 21 MMOL/L (ref 21–32)
CREAT SERPL-MCNC: <0.5 MG/DL (ref 0.8–1.3)
EKG ATRIAL RATE: 109 BPM
EKG DIAGNOSIS: NORMAL
EKG P AXIS: 37 DEGREES
EKG P-R INTERVAL: 132 MS
EKG Q-T INTERVAL: 334 MS
EKG QRS DURATION: 86 MS
EKG QTC CALCULATION (BAZETT): 449 MS
EKG R AXIS: -17 DEGREES
EKG T AXIS: 21 DEGREES
EKG VENTRICULAR RATE: 109 BPM
GFR AFRICAN AMERICAN: >60
GFR NON-AFRICAN AMERICAN: >60
GLUCOSE BLD-MCNC: 121 MG/DL (ref 70–99)
HCT VFR BLD CALC: 39 % (ref 40.5–52.5)
HEMOGLOBIN: 12.9 G/DL (ref 13.5–17.5)
LACTIC ACID: 1.1 MMOL/L (ref 0.4–2)
LACTIC ACID: 1.4 MMOL/L (ref 0.4–2)
LACTIC ACID: 1.6 MMOL/L (ref 0.4–2)
MCH RBC QN AUTO: 29.6 PG (ref 26–34)
MCHC RBC AUTO-ENTMCNC: 33.2 G/DL (ref 31–36)
MCV RBC AUTO: 89.2 FL (ref 80–100)
PDW BLD-RTO: 13.4 % (ref 12.4–15.4)
PLATELET # BLD: 271 K/UL (ref 135–450)
PMV BLD AUTO: 7.1 FL (ref 5–10.5)
POTASSIUM REFLEX MAGNESIUM: 4.7 MMOL/L (ref 3.5–5.1)
RBC # BLD: 4.37 M/UL (ref 4.2–5.9)
REASON FOR REJECTION: NORMAL
REJECTED TEST: NORMAL
SARS-COV-2: DETECTED
SODIUM BLD-SCNC: 137 MMOL/L (ref 136–145)
VITAMIN D 25-HYDROXY: 20.8 NG/ML
WBC # BLD: 9.6 K/UL (ref 4–11)

## 2022-01-19 PROCEDURE — 94761 N-INVAS EAR/PLS OXIMETRY MLT: CPT

## 2022-01-19 PROCEDURE — 6370000000 HC RX 637 (ALT 250 FOR IP): Performed by: INTERNAL MEDICINE

## 2022-01-19 PROCEDURE — 2580000003 HC RX 258: Performed by: INTERNAL MEDICINE

## 2022-01-19 PROCEDURE — 82306 VITAMIN D 25 HYDROXY: CPT

## 2022-01-19 PROCEDURE — XW0DXM6 INTRODUCTION OF BARICITINIB INTO MOUTH AND PHARYNX, EXTERNAL APPROACH, NEW TECHNOLOGY GROUP 6: ICD-10-PCS | Performed by: INTERNAL MEDICINE

## 2022-01-19 PROCEDURE — 6360000002 HC RX W HCPCS: Performed by: INTERNAL MEDICINE

## 2022-01-19 PROCEDURE — 87449 NOS EACH ORGANISM AG IA: CPT

## 2022-01-19 PROCEDURE — 1200000000 HC SEMI PRIVATE

## 2022-01-19 PROCEDURE — 2700000000 HC OXYGEN THERAPY PER DAY

## 2022-01-19 PROCEDURE — 83605 ASSAY OF LACTIC ACID: CPT

## 2022-01-19 PROCEDURE — 85027 COMPLETE CBC AUTOMATED: CPT

## 2022-01-19 PROCEDURE — 94640 AIRWAY INHALATION TREATMENT: CPT

## 2022-01-19 PROCEDURE — 36415 COLL VENOUS BLD VENIPUNCTURE: CPT

## 2022-01-19 PROCEDURE — 80048 BASIC METABOLIC PNL TOTAL CA: CPT

## 2022-01-19 PROCEDURE — 93010 ELECTROCARDIOGRAM REPORT: CPT | Performed by: INTERNAL MEDICINE

## 2022-01-19 RX ORDER — METHYLPREDNISOLONE SODIUM SUCCINATE 40 MG/ML
40 INJECTION, POWDER, LYOPHILIZED, FOR SOLUTION INTRAMUSCULAR; INTRAVENOUS EVERY 12 HOURS
Status: DISCONTINUED | OUTPATIENT
Start: 2022-01-19 | End: 2022-01-28

## 2022-01-19 RX ADMIN — SODIUM CHLORIDE, PRESERVATIVE FREE 10 ML: 5 INJECTION INTRAVENOUS at 09:01

## 2022-01-19 RX ADMIN — ENOXAPARIN SODIUM 30 MG: 30 INJECTION SUBCUTANEOUS at 09:07

## 2022-01-19 RX ADMIN — Medication 10 ML: at 16:50

## 2022-01-19 RX ADMIN — OXYCODONE HYDROCHLORIDE 15 MG: 5 TABLET ORAL at 18:46

## 2022-01-19 RX ADMIN — DEXAMETHASONE 6 MG: 4 TABLET ORAL at 09:06

## 2022-01-19 RX ADMIN — OXYCODONE HYDROCHLORIDE 15 MG: 5 TABLET ORAL at 09:16

## 2022-01-19 RX ADMIN — Medication 2 PUFF: at 20:04

## 2022-01-19 RX ADMIN — BARICITINIB 4 MG: 2 TABLET, FILM COATED ORAL at 19:20

## 2022-01-19 RX ADMIN — OXYCODONE HYDROCHLORIDE 15 MG: 5 TABLET ORAL at 23:45

## 2022-01-19 RX ADMIN — ENOXAPARIN SODIUM 30 MG: 30 INJECTION SUBCUTANEOUS at 20:35

## 2022-01-19 RX ADMIN — SODIUM CHLORIDE, PRESERVATIVE FREE 10 ML: 5 INJECTION INTRAVENOUS at 20:36

## 2022-01-19 RX ADMIN — Medication 10 ML: at 18:44

## 2022-01-19 RX ADMIN — SODIUM CHLORIDE 25 ML: 9 INJECTION, SOLUTION INTRAVENOUS at 16:53

## 2022-01-19 RX ADMIN — Medication 3 MG: at 20:35

## 2022-01-19 RX ADMIN — ACETAMINOPHEN 650 MG: 325 TABLET ORAL at 21:03

## 2022-01-19 RX ADMIN — Medication 200 MG: at 09:06

## 2022-01-19 RX ADMIN — AZITHROMYCIN MONOHYDRATE 500 MG: 500 INJECTION, POWDER, LYOPHILIZED, FOR SOLUTION INTRAVENOUS at 17:32

## 2022-01-19 RX ADMIN — OXYCODONE HYDROCHLORIDE 15 MG: 5 TABLET ORAL at 14:07

## 2022-01-19 RX ADMIN — OXYCODONE HYDROCHLORIDE 15 MG: 5 TABLET ORAL at 04:49

## 2022-01-19 RX ADMIN — ASPIRIN 81 MG: 81 TABLET, COATED ORAL at 09:05

## 2022-01-19 RX ADMIN — FAMOTIDINE 20 MG: 20 TABLET ORAL at 20:35

## 2022-01-19 RX ADMIN — CEFTRIAXONE SODIUM 1000 MG: 1 INJECTION, POWDER, FOR SOLUTION INTRAMUSCULAR; INTRAVENOUS at 16:53

## 2022-01-19 RX ADMIN — Medication 2 PUFF: at 08:22

## 2022-01-19 RX ADMIN — FAMOTIDINE 20 MG: 20 TABLET ORAL at 09:05

## 2022-01-19 RX ADMIN — METHYLPREDNISOLONE SODIUM SUCCINATE 40 MG: 40 INJECTION, POWDER, LYOPHILIZED, FOR SOLUTION INTRAMUSCULAR; INTRAVENOUS at 18:44

## 2022-01-19 ASSESSMENT — PAIN DESCRIPTION - LOCATION
LOCATION_2: NECK
LOCATION: SHOULDER;NECK
LOCATION: BACK
LOCATION: BACK

## 2022-01-19 ASSESSMENT — PAIN DESCRIPTION - PAIN TYPE
TYPE: CHRONIC PAIN

## 2022-01-19 ASSESSMENT — PAIN SCALES - GENERAL
PAINLEVEL_OUTOF10: 7
PAINLEVEL_OUTOF10: 6
PAINLEVEL_OUTOF10: 8
PAINLEVEL_OUTOF10: 6
PAINLEVEL_OUTOF10: 5
PAINLEVEL_OUTOF10: 7
PAINLEVEL_OUTOF10: 5
PAINLEVEL_OUTOF10: 5
PAINLEVEL_OUTOF10: 6
PAINLEVEL_OUTOF10: 5
PAINLEVEL_OUTOF10: 5

## 2022-01-19 ASSESSMENT — PAIN DESCRIPTION - INTENSITY: RATING_2: 6

## 2022-01-19 NOTE — CARE COORDINATION
Chart review day 1- from home, [place], [place] pt. PCR pending. IV abx/steroid; 15L NC; history of chronic pain, Will follow for possible needs.

## 2022-01-19 NOTE — PROGRESS NOTES
without deformity. Skin: Skin color, texture, turgor normal.  No rashes or lesions. Neurologic:  Neurovascularly intact without any focal sensory/motor deficits. Cranial nerves: II-XII intact, grossly non-focal.  Psychiatric: Alert and oriented, thought content appropriate, normal insight  Capillary Refill: Brisk,3 seconds, normal   Peripheral Pulses: +2 palpable, equal bilaterally       Labs:   Recent Labs     01/18/22  1505 01/19/22  0906   WBC 8.9 9.6   HGB 13.9 12.9*   HCT 42.3 39.0*    271     Recent Labs     01/18/22  1505 01/19/22  0619   * 137   K 4.5 4.7    106   CO2 21 21   BUN 14 11   CREATININE 0.6* <0.5*   CALCIUM 8.7 8.4     Recent Labs     01/18/22  1505   AST 35   ALT 29   BILITOT 0.7   ALKPHOS 48     No results for input(s): INR in the last 72 hours. Recent Labs     01/18/22  1505   TROPONINI <0.01       Urinalysis:    No results found for: Ananth Setting, BACTERIA, RBCUA, BLOODU, SPECGRAV, GLUCOSEU    Radiology:  XR CHEST PORTABLE   Final Result   Bilateral pulmonary opacification concerning for multifocal pneumonia   including COVID-19 pneumonia. Assessment/Plan:    Active Hospital Problems    Diagnosis     Acute hypoxemic respiratory failure ([facility] [address] [J96.01]     Pneumonia [J18.9]     COPD (chronic obstructive pulmonary disease) ([facility] [address] [J44.9]     Sepsis ([facility] [address] [A41.9]     Tachycardia [R00.0]     Tachypnea [R06.82]     Elevated lactic acid level [R79.89]      Acute hypoxic respiratory failure, POA- likely due to [address] evidenced by respiratory distress, use of accessory muscles and O2 saturation of less than 90% on room air on presentation   - supplemental oxygen as necessary to keep SaO2 > 90%, not on O2 at home  - Dexamethasone was started. Switched to iv solumedrol 1/19  -baricitnib started 1/19    Pneumonia, organism unspecified - Pt has been started on Rocephin and Azithromycin  - COVID-19 pneumonia suspected;  Pt tested negative for the COVID-19 virus on the rapid test. However, due to the appearance of the pneumonia on CXR a PCR has been ordered and positive  - Pt placed in \"droplet plus\" isolation, and PPE instructions have been provided  - Rapid Influenza A&B negative  - Blood cultures x 2 ordered  - Respiratory culture ordered  - Legionella pneumophilia and Strep pneumoniae urine antigens ordered  - Procalcitonin 0.09 (not elevated)  - Incentive spirometry ordered  - Low intensity Enoxaparin has been started  - Will provide high calorie, high protein dietary supplements  - Dexamethasone was started. Consider DC if COVID PCR is negative  - 25 hydroxy Vitamin D lab ordered. Will replace Vitamin D if low  - Zinc sulfate 50 mg bid ordered  - Thiamine 200 mg po daily ordered  - Pepcid 20 mg po bid     Cough - will provide antitussive medications as needed     Non-Intractable vomiting with nausea - Will provide symptomatic treatment with Zofran and/or Phenergan as needed, maintenance of fluids and electrolytes     Poor appetite - Patient encouraged to eat a balanced diet, including protein-rich foods. - Will provide high calorie, high protein dietary supplements     Generalized weakness/fatigue - Expect to improve with treatment of Pneumonia. If not, we will will ask PT/OT to evaluate and treat patient, and if necessary to provide recommendations for post hospital therapy     Sepsis ([address] due to Pneumonia with tachycardia, tachypnea. Initial Lactic Acid was elevated. - Pt will not be resuscitated with the full 30 cc/Kg IV Fluids per sepsis protocol as his BP is adequate and he is a high risk of fluid overload and catastrophic decompensation if he were to receive such a bolus. His blood pressure will be monitored closely  - We will cycle serial lactic acid levels until lactic acid has normalized  - Blood cultures x 2 have been ordered       COPD (chronic obstructive pulmonary disease) (HCC) -without acute exacerbation.   We will monitor and provide breathing treatments as indicated        DVT Prophylaxis: Low intensity Enoxaparin  Diet: ADULT ORAL NUTRITION SUPPLEMENT; Lunch, Dinner; Standard High Calorie/High Protein Oral Supplement  ADULT DIET;  Regular  Code Status: Full Code    PT/OT Eval Status: not ordered    Dispo - pending workup, ordered baricitinib, switch to solumedrol    Whit Agent, MD 43 year old healthy female with multiple allergies, presents with abnormal spotting less than a month. Pt had sonogram which showed the presence of a vaginal polyp. Pt is schedule for Dilation and curettage hysteroscopy endometrial polypectomy on 3/25/2019.

## 2022-01-19 NOTE — H&P
Hospital Medicine  History and Physical    PCP: No primary care provider on file. Patient Name: Fozia Mckeon    Date of Service: Pt seen/examined on 1/18/22 and admitted to Inpatient with expected LOS greater than two midnights due to medical therapy    CHIEF COMPLAINT:  Pt c/o shortness of breath  HISTORY OF PRESENT ILLNESS: Pt is an 78y.o. year-old male with a history of COPD who presents to the emergency room for evaluation following a 2-week history of shortness of breath, and intermittent nonproductive cough and intermittent episodes of emesis. The emesis is nonbilious nonbloody. In the emergency room he was found to have an oxygen saturation of 83% on room air. He had a rapid COVID test performed which was negative. However, imaging has findings suggestive of a COVID-19 pneumonia. He is being admitted for further evaluation and treatment. Associated signs and symptoms do not include fever or chills, abdominal pain, hemoptysis, hematochezia, diarrhea, constipation or urinary symptoms. Past Medical History:        Diagnosis Date    COPD (chronic obstructive pulmonary disease) ([facility] [address]        Past Surgical History:    History reviewed. No pertinent surgical history. Allergies:  Hydromorphone, Morphine, and Ciprofloxacin    Medications Prior to Admission:    Prior to Admission medications    Medication Sig Start Date End Date Taking? Authorizing Provider   albuterol sulfate  (90 Base) MCG/ACT inhaler INHALE 2 PUFFS BY ORAL INHALATION EVERY 6 HOURS AS NEEDED FOR SHORTNESS OF BREATH. SHAKE WELL; RINSE MOUTHPIECE FREQUENTLY TO PREVENT CLOGGING. 11/30/20   Historical Provider, MD   gabapentin (NEURONTIN) 600 MG tablet TAKE TWO TABLETS BY MOUTH THREE TIMES A DAY DOSE INCREASE 7/19/21 7/20/22  Historical Provider, MD   fluticasone-salmeterol (ADVAIR) 500-50 MCG/DOSE diskus inhaler INHALE 1 PUFF BY ORAL INHALATION TWICE A DAY FOR BREATHING. GARGLE AND RINSE YOUR MOUTH WITH WATER AFTER USING.   DO NOT SWALLOW RINSE WATER. **REPLACES SYMBICORT** 9/7/21   Historical Provider, MD   oxyCODONE HCl (OXY-IR) 10 MG immediate release tablet TAKE ONE TABLET BY MOUTH AS DIRECTED AS NEEDED --- TAKE ONE AND ONE-HALF TABLETS (15MG) EVERY FOUR HOURS. AVOID TAKING WITHIN TWO HOURS OF GABAPENTIN. (FOR 11/03 THROUGH 11/30) 10/28/21   Historical Provider, MD   naloxone (NARCAN) 4 MG/0.1ML LIQD nasal spray SPRAY 1 SPRAY INTO A NOSTRIL AS DIRECTED TO REVERSE OPIOID OVERDOSE. IF PATIENT DOES NOT RESPOND OR RELAPSES INTO RESPIRATORY DEPRESSION, ADMINISTER ADDITIONAL SINGLE SPRAY INTO OTHER NOSTRIL USING A 1/20/21 1/21/22  Historical Provider, MD   aspirin EC 81 MG EC tablet Take 1 tablet by mouth daily 10/31/21   Bradley Padilla MD       Family History:   Family history is negative for accelerated coronary artery disease, diabetes or malignancies. Social History:   TOBACCO:   reports that he has been smoking cigarettes. He has been smoking about 1.00 pack per day. He has never used smokeless tobacco.  ETOH:   reports previous alcohol use. OCCUPATION:      REVIEW OF SYSTEMS:  A full review of systems was performed and is negative except for that which appears in the HPI    Physical Exam:    Vitals: BP (!) 139/94   Pulse 91   Temp 97.4 °F (36.3 °C) (Oral)   Resp 20   Ht 5' 10\" (1.778 m)   Wt 200 lb (90.7 kg)   SpO2 90%   BMI 28.70 kg/m²   General appearance: WD/WN 78y.o. year-old male who is alert, appears stated age and is cooperative  HEENT: Head: Normocephalic, no lesions, without obvious abnormality. Eye: Normal external eye, conjunctiva, lids cornea, PEERL. Ears: Normal external ears. Non-tender. Nose: Normal external nose, mucus membranes and septum. Pharynx: Dental Hygiene adequate. Normal buccal mucosa. Normal pharynx.   Neck: no adenopathy, no carotid bruit, no JVD, supple, symmetrical, trachea midline and thyroid not enlarged, symmetric, no tenderness/mass/nodules  Lungs: tachypnea present; restricted air movement on auscultation bilaterally. + respiratory distress, air hunger, + use of accessory muscles  Heart: rapid rate and regular rhythm, S1, S2 normal, no murmur, click, rub or gallop and normal apical impulse  Abdomen: soft, non-tender; bowel sounds normal; no masses, no organomegaly  Extremities: extremities atraumatic, no cyanosis or edema and Homans sign is negative, no sign of DVT. Capillary Refill: Acceptable < 3 seconds   Peripheral Pulses: +3 easily felt, not easily obliterated with pressures   Skin: Skin color, texture, turgor normal. No rashes or lesions on exposed skin  Neurologic: Neurovascularly intact without any focal sensory/motor deficits. Cranial nerves: II-XII intact, grossly non-focal. Gait was not tested. Mental Status: Alert and oriented, thought content appropriate, normal insight      CBC:   Recent Labs     01/18/22  1505   WBC 8.9   HGB 13.9        BMP:    Recent Labs     01/18/22  1505   *   K 4.5      CO2 21   BUN 14   CREATININE 0.6*   GLUCOSE 146*     Troponin:   Recent Labs     01/18/22  1505   TROPONINI <0.01     PT/INR:  No results found for: PTINR  U/A:  No results found for: LEUKOCYTESUR, NITRITE, RBCUA, SPECGRAV, [address], BILIRUBINUR, BLOODU, [place]      RAD:   I have independently reviewed and interpreted the imaging studies below and based my recommendations to the patient on those findings. XR CHEST PORTABLE    Result Date: 1/18/2022  EXAMINATION: ONE XRAY VIEW OF THE CHEST 1/18/2022 3:14 pm COMPARISON: None. HISTORY: ORDERING SYSTEM PROVIDED HISTORY: SOB TECHNOLOGIST PROVIDED HISTORY: Reason for exam:->SOB Reason for Exam: sob FINDINGS: Bilateral pulmonary opacification, most extensive in the lung bases in the mid left lung peripherally. The upper lung fields are relatively clear. There is no pleural fluid or pneumothorax. Linear atelectasis or scarring hip the right lung base. The cardiac silhouette is not enlarged. Postoperative clips overlying the left hilum and in the left axilla. Previous ORIF of left clavicle fracture. Bilateral pulmonary opacification concerning for multifocal pneumonia including COVID-19 pneumonia. Assessment:   Principal Problem:    Pneumonia  Active Problems:    Acute hypoxemic respiratory failure (HCC)    COPD (chronic obstructive pulmonary disease) (HCC)    Sepsis (HCC)    Tachycardia    Tachypnea    Elevated lactic acid level  Resolved Problems:    * No resolved hospital problems. *      Plan:       Pneumonia, organism unspecified - Pt has been started on Rocephin and Azithromycin  - COVID-19 pneumonia suspected; Pt tested negative for the COVID-19 virus on the rapid test. However, due to the appearance of the pneumonia on CXR a PCR has been ordered  - Pt placed in \"droplet plus\" isolation, and PPE instructions have been provided  - Rapid Influenza A&B negative  - Blood cultures x 2 ordered  - Respiratory culture ordered  - Legionella pneumophilia and Strep pneumoniae urine antigens ordered  - Procalcitonin 0.09 (not elevated)  - Incentive spirometry ordered  - Low intensity Enoxaparin has been started  - Will provide high calorie, high protein dietary supplements  - Dexamethasone was started. Consider DC if COVID PCR is negative  - 25 hydroxy Vitamin D lab ordered. Will replace Vitamin D if low  - Zinc sulfate 50 mg bid ordered  - Thiamine 200 mg po daily ordered  - Pepcid 20 mg po bid    Cough - will provide antitussive medications as needed    Non-Intractable vomiting with nausea - Will provide symptomatic treatment with Zofran and/or Phenergan as needed, maintenance of fluids and electrolytes    Poor appetite - Patient encouraged to eat a balanced diet, including protein-rich foods. - Will provide high calorie, high protein dietary supplements    Generalized weakness/fatigue - Expect to improve with treatment of Pneumonia.  If not, we will will ask PT/OT to evaluate and treat patient, and if necessary to provide recommendations for post hospital therapy    Sepsis ([facility] [address] due to Pneumonia with tachycardia, tachypnea. Initial Lactic Acid was elevated. - Pt will not be resuscitated with the full 30 cc/Kg IV Fluids per sepsis protocol as his BP is adequate and he is a high risk of fluid overload and catastrophic decompensation if he were to receive such a bolus. His blood pressure will be monitored closely  - We will cycle serial lactic acid levels until lactic acid has normalized  - Blood cultures x 2 have been ordered    Acute hypoxic respiratory failure, POA  - as evidenced by respiratory distress, use of accessory muscles and O2 saturation of less than 90% on room air on presentation   - supplemental oxygen as necessary to keep SaO2 > 90%, not on O2 at home  - Dexamethasone was started. Consider DC if COVID PCR is negative     COPD (chronic obstructive pulmonary disease) ([facility] [address] -without acute exacerbation. We will monitor and provide breathing treatments as indicated      DVT Prophylaxis: Low intensity Enoxaparin  Diet: ADULT DIET; Regular  ADULT ORAL NUTRITION SUPPLEMENT; Lunch, Dinner; Standard High Calorie/High Protein Oral Supplement  Code Status: Full Code  (Advanced care planning has been discussed with patient and/or responsible family member and is reflected in the code status.  Further orders associated with this have been entered if appropriate)    Disposition: Anticipate that patient will remain in the hospital for 2 to 3+ days depending on further evaluation and clinical course     Please note that over 50 minutes was spent in evaluating the patient, review of records and results, discussion with staff/family, etc.    Lester Yadav MD

## 2022-01-20 PROBLEM — F12.90 MARIJUANA USE: Status: ACTIVE | Noted: 2022-01-20

## 2022-01-20 PROBLEM — E66.3 OVERWEIGHT (BMI 25.0-29.9): Status: ACTIVE | Noted: 2022-01-20

## 2022-01-20 PROBLEM — R79.82 ELEVATED C-REACTIVE PROTEIN (CRP): Status: ACTIVE | Noted: 2022-01-20

## 2022-01-20 PROBLEM — R91.8 PULMONARY INFILTRATES: Status: ACTIVE | Noted: 2022-01-20

## 2022-01-20 PROBLEM — F17.200 CURRENT SMOKER: Status: ACTIVE | Noted: 2022-01-20

## 2022-01-20 PROBLEM — Z79.891 CHRONIC PRESCRIPTION OPIATE USE: Status: ACTIVE | Noted: 2022-01-20

## 2022-01-20 PROBLEM — R73.9 HYPERGLYCEMIA: Status: ACTIVE | Noted: 2022-01-20

## 2022-01-20 LAB
ALBUMIN SERPL-MCNC: 3.2 G/DL (ref 3.4–5)
ANION GAP SERPL CALCULATED.3IONS-SCNC: 12 MMOL/L (ref 3–16)
BASOPHILS ABSOLUTE: 0 K/UL (ref 0–0.2)
BASOPHILS RELATIVE PERCENT: 0.2 %
BUN BLDV-MCNC: 22 MG/DL (ref 7–20)
C-REACTIVE PROTEIN: 33.1 MG/L (ref 0–5.1)
CALCIUM SERPL-MCNC: 8.9 MG/DL (ref 8.3–10.6)
CHLORIDE BLD-SCNC: 103 MMOL/L (ref 99–110)
CO2: 22 MMOL/L (ref 21–32)
CREAT SERPL-MCNC: <0.5 MG/DL (ref 0.8–1.3)
EOSINOPHILS ABSOLUTE: 0 K/UL (ref 0–0.6)
EOSINOPHILS RELATIVE PERCENT: 0 %
GFR AFRICAN AMERICAN: >60
GFR NON-AFRICAN AMERICAN: >60
GLUCOSE BLD-MCNC: 157 MG/DL (ref 70–99)
HCT VFR BLD CALC: 37.1 % (ref 40.5–52.5)
HEMOGLOBIN: 12.6 G/DL (ref 13.5–17.5)
L. PNEUMOPHILA SEROGP 1 UR AG: NORMAL
LACTIC ACID: 1.3 MMOL/L (ref 0.4–2)
LACTIC ACID: 2.3 MMOL/L (ref 0.4–2)
LYMPHOCYTES ABSOLUTE: 0.7 K/UL (ref 1–5.1)
LYMPHOCYTES RELATIVE PERCENT: 7.8 %
MCH RBC QN AUTO: 30.1 PG (ref 26–34)
MCHC RBC AUTO-ENTMCNC: 33.8 G/DL (ref 31–36)
MCV RBC AUTO: 89.2 FL (ref 80–100)
MONOCYTES ABSOLUTE: 0.4 K/UL (ref 0–1.3)
MONOCYTES RELATIVE PERCENT: 4.9 %
NEUTROPHILS ABSOLUTE: 7.5 K/UL (ref 1.7–7.7)
NEUTROPHILS RELATIVE PERCENT: 87.1 %
PDW BLD-RTO: 13.3 % (ref 12.4–15.4)
PHOSPHORUS: 3.2 MG/DL (ref 2.5–4.9)
PLATELET # BLD: 316 K/UL (ref 135–450)
PMV BLD AUTO: 7 FL (ref 5–10.5)
POTASSIUM SERPL-SCNC: 4.3 MMOL/L (ref 3.5–5.1)
RBC # BLD: 4.16 M/UL (ref 4.2–5.9)
SODIUM BLD-SCNC: 137 MMOL/L (ref 136–145)
STREP PNEUMONIAE ANTIGEN, URINE: NORMAL
WBC # BLD: 8.6 K/UL (ref 4–11)

## 2022-01-20 PROCEDURE — 85379 FIBRIN DEGRADATION QUANT: CPT

## 2022-01-20 PROCEDURE — 86140 C-REACTIVE PROTEIN: CPT

## 2022-01-20 PROCEDURE — 36415 COLL VENOUS BLD VENIPUNCTURE: CPT

## 2022-01-20 PROCEDURE — 83605 ASSAY OF LACTIC ACID: CPT

## 2022-01-20 PROCEDURE — 85025 COMPLETE CBC W/AUTO DIFF WBC: CPT

## 2022-01-20 PROCEDURE — 80069 RENAL FUNCTION PANEL: CPT

## 2022-01-20 PROCEDURE — 6370000000 HC RX 637 (ALT 250 FOR IP): Performed by: INTERNAL MEDICINE

## 2022-01-20 PROCEDURE — 82728 ASSAY OF FERRITIN: CPT

## 2022-01-20 PROCEDURE — 2580000003 HC RX 258: Performed by: INTERNAL MEDICINE

## 2022-01-20 PROCEDURE — 6360000002 HC RX W HCPCS: Performed by: INTERNAL MEDICINE

## 2022-01-20 PROCEDURE — 2700000000 HC OXYGEN THERAPY PER DAY

## 2022-01-20 PROCEDURE — 99223 1ST HOSP IP/OBS HIGH 75: CPT | Performed by: INTERNAL MEDICINE

## 2022-01-20 PROCEDURE — 1200000000 HC SEMI PRIVATE

## 2022-01-20 PROCEDURE — 94761 N-INVAS EAR/PLS OXIMETRY MLT: CPT

## 2022-01-20 PROCEDURE — 94640 AIRWAY INHALATION TREATMENT: CPT

## 2022-01-20 RX ADMIN — Medication 200 MG: at 09:07

## 2022-01-20 RX ADMIN — Medication 3 MG: at 20:19

## 2022-01-20 RX ADMIN — FAMOTIDINE 20 MG: 20 TABLET ORAL at 09:07

## 2022-01-20 RX ADMIN — FAMOTIDINE 20 MG: 20 TABLET ORAL at 20:19

## 2022-01-20 RX ADMIN — OXYCODONE HYDROCHLORIDE 15 MG: 5 TABLET ORAL at 13:32

## 2022-01-20 RX ADMIN — SODIUM CHLORIDE, PRESERVATIVE FREE 10 ML: 5 INJECTION INTRAVENOUS at 09:08

## 2022-01-20 RX ADMIN — METHYLPREDNISOLONE SODIUM SUCCINATE 40 MG: 40 INJECTION, POWDER, LYOPHILIZED, FOR SOLUTION INTRAMUSCULAR; INTRAVENOUS at 17:44

## 2022-01-20 RX ADMIN — ENOXAPARIN SODIUM 30 MG: 30 INJECTION SUBCUTANEOUS at 20:19

## 2022-01-20 RX ADMIN — ENOXAPARIN SODIUM 30 MG: 30 INJECTION SUBCUTANEOUS at 09:07

## 2022-01-20 RX ADMIN — OXYCODONE HYDROCHLORIDE 15 MG: 5 TABLET ORAL at 05:20

## 2022-01-20 RX ADMIN — OXYCODONE HYDROCHLORIDE 15 MG: 5 TABLET ORAL at 09:12

## 2022-01-20 RX ADMIN — OXYCODONE HYDROCHLORIDE 15 MG: 5 TABLET ORAL at 21:43

## 2022-01-20 RX ADMIN — BARICITINIB 4 MG: 2 TABLET, FILM COATED ORAL at 09:07

## 2022-01-20 RX ADMIN — METHYLPREDNISOLONE SODIUM SUCCINATE 40 MG: 40 INJECTION, POWDER, LYOPHILIZED, FOR SOLUTION INTRAMUSCULAR; INTRAVENOUS at 06:44

## 2022-01-20 RX ADMIN — Medication 2 PUFF: at 09:00

## 2022-01-20 RX ADMIN — Medication 10 ML: at 06:45

## 2022-01-20 RX ADMIN — Medication 2 PUFF: at 19:47

## 2022-01-20 RX ADMIN — OXYCODONE HYDROCHLORIDE 15 MG: 5 TABLET ORAL at 17:01

## 2022-01-20 RX ADMIN — ASPIRIN 81 MG: 81 TABLET, COATED ORAL at 09:07

## 2022-01-20 ASSESSMENT — PAIN DESCRIPTION - LOCATION
LOCATION: BACK
LOCATION_2: NECK
LOCATION: BACK
LOCATION: BACK

## 2022-01-20 ASSESSMENT — PAIN DESCRIPTION - PAIN TYPE
TYPE: CHRONIC PAIN
TYPE: CHRONIC PAIN

## 2022-01-20 ASSESSMENT — PAIN SCALES - GENERAL
PAINLEVEL_OUTOF10: 6
PAINLEVEL_OUTOF10: 8
PAINLEVEL_OUTOF10: 6
PAINLEVEL_OUTOF10: 8
PAINLEVEL_OUTOF10: 8
PAINLEVEL_OUTOF10: 6

## 2022-01-20 ASSESSMENT — PAIN DESCRIPTION - INTENSITY: RATING_2: 6

## 2022-01-20 NOTE — PLAN OF CARE
Protect patient from fall injury by keeping bed in low position, use of non skid socks and bed alarm system. Keep belongings and call light with reach at all times and following fall protocol. Encourage patient to ask for pain medication before pain is above a 5 on the 1/10 pain scale. Medicate before PT or increased activity. Use alternatives such as ice (if ordered) or distraction as often as possible to minimize  need for medication. Monitor Respiratory status every hours. Titrate O2 as needed. Turn and reposition patient every 2 hours. Keep HOB up 30 degrees. Monitor patient for decreasing O2 saturation or more labored breathing. Encourage IS use and prone patient as needed.

## 2022-01-20 NOTE — PROGRESS NOTES
Hospitalist Progress Note      PCP: No primary care provider on file. Date of Admission: 1/18/2022    Chief Complaint: sob     Hospital Course:  reviewed      Subjective: resting in bed, remains on HFNC, notes ongoing sob , sleeping well, eating well      Medications:  Reviewed    Infusion Medications    sodium chloride 25 mL (01/19/22 1653)     Scheduled Medications    baricitinib  4 mg Oral Daily    methylPREDNISolone  40 mg IntraVENous Q12H    cefTRIAXone (ROCEPHIN) IV  1,000 mg IntraVENous Q24H    azithromycin  500 mg IntraVENous Q24H    aspirin EC  81 mg Oral Daily    budesonide-formoterol  2 puff Inhalation BID    sodium chloride flush  5-40 mL IntraVENous 2 times per day    nicotine  1 patch TransDERmal Daily    famotidine  20 mg Oral BID    melatonin  3 mg Oral Nightly    thiamine  200 mg Oral Daily    enoxaparin  30 mg SubCUTAneous BID     PRN Meds: oxyCODONE, sodium chloride flush, sodium chloride, polyethylene glycol, acetaminophen **OR** acetaminophen, prochlorperazine, albuterol sulfate HFA, benzonatate      Intake/Output Summary (Last 24 hours) at 1/20/2022 1142  Last data filed at 1/19/2022 2339  Gross per 24 hour   Intake 840 ml   Output 1075 ml   Net -235 ml       Physical Exam Performed:    /71   Pulse 69   Temp 97.7 °F (36.5 °C) (Oral)   Resp 18   Ht 5' 10\" (1.778 m)   Wt 192 lb 0.3 oz (87.1 kg)   SpO2 95%   BMI 27.55 kg/m²       General appearance: No apparent distress, appears stated age and cooperative. HEENT: Pupils equal, round, and reactive to light. Conjunctivae/corneas clear. Neck: Supple, with full range of motion. No jugular venous distention. Trachea midline. Respiratory:  Normal respiratory effort. Clear to auscultation, bilaterally without Wheezes/Rhonchi. bibas rales noted  Cardiovascular: Regular rate and rhythm with normal S1/S2 without murmurs, rubs or gallops.   Abdomen: Soft, non-tender, non-distended with normal bowel unspecified - Pt has been started on Rocephin and Azithromycin  - COVID-19 pneumonia suspected; Rapid COVID was neg but PCR was positive  - Pt placed in \"droplet plus\" isolation, and PPE instructions have been provided  - Rapid Influenza A&B negative  - Blood cultures x 2 ordered  - Respiratory culture ordered  - Legionella pneumophilia and Strep pneumoniae urine antigens ordered and neg  - Procalcitonin 0.09 (not elevated)  - Incentive spirometry ordered  - Low intensity Enoxaparin has been started  - Will provide high calorie, high protein dietary supplements  - Dexamethasone was started. -switched to iv solumedrol  - 25 hydroxy Vitamin D lab ordered. Will replace Vitamin D if low  - Zinc sulfate 50 mg bid ordered  - Thiamine 200 mg po daily ordered  - Pepcid 20 mg po bid     Cough- provided antitussive medications as needed     Non-Intractable vomiting with nausea - Will provide symptomatic treatment with Zofran and/or Phenergan as needed, maintenance of fluids and electrolytes     Poor appetite - improved, Patient encouraged to eat a balanced diet, including protein-rich foods. -provided high calorie, high protein dietary supplements     Generalized weakness/fatigue - Expect to improve with treatment of Pneumonia. If not, we will will ask PT/OT to evaluate and treat patient, and if necessary to provide recommendations for post hospital therapy     Sepsis ([facility] [address] due to Pneumonia with tachycardia, tachypnea. Initial Lactic Acid was elevated. - Pt will not be resuscitated with the full 30 cc/Kg IV Fluids per sepsis protocol as his BP is adequate and he is a high risk of fluid overload and catastrophic decompensation if he were to receive such a bolus.  His blood pressure will be monitored closely  -monitored lactate, normalized  - Blood cultures x 2 have been ordered      COPD (chronic obstructive pulmonary disease) (HCC) -without acute exacerbation.  We will monitor and provide breathing treatments as indicated     DVT Prophylaxis: Low intensity Enoxaparin  Diet: ADULT ORAL NUTRITION SUPPLEMENT; Lunch, Dinner; Standard High Calorie/High Protein Oral Supplement  ADULT DIET;  Regular  Code Status: Full Code      PT/OT Eval Status: not ordered     Dispo - pending workup, unclear, pulm consulted    Travis Jay MD

## 2022-01-21 PROBLEM — R79.89 ELEVATED D-DIMER: Status: ACTIVE | Noted: 2022-01-21

## 2022-01-21 PROBLEM — R79.89 ELEVATED FERRITIN LEVEL: Status: ACTIVE | Noted: 2022-01-21

## 2022-01-21 LAB
ALBUMIN SERPL-MCNC: 2.1 G/DL (ref 3.4–5)
ANION GAP SERPL CALCULATED.3IONS-SCNC: 12 MMOL/L (ref 3–16)
BASOPHILS ABSOLUTE: 0 K/UL (ref 0–0.2)
BASOPHILS RELATIVE PERCENT: 0.3 %
BUN BLDV-MCNC: 23 MG/DL (ref 7–20)
C-REACTIVE PROTEIN: 15.7 MG/L (ref 0–5.1)
CALCIUM SERPL-MCNC: 8.4 MG/DL (ref 8.3–10.6)
CHLORIDE BLD-SCNC: 106 MMOL/L (ref 99–110)
CO2: 17 MMOL/L (ref 21–32)
CREAT SERPL-MCNC: 0.6 MG/DL (ref 0.8–1.3)
D DIMER: 1660 NG/ML DDU (ref 0–229)
EOSINOPHILS ABSOLUTE: 0 K/UL (ref 0–0.6)
EOSINOPHILS RELATIVE PERCENT: 0.5 %
FERRITIN: 495.4 NG/ML (ref 30–400)
GFR AFRICAN AMERICAN: >60
GFR NON-AFRICAN AMERICAN: >60
GLUCOSE BLD-MCNC: 81 MG/DL (ref 70–99)
HCT VFR BLD CALC: 40.9 % (ref 40.5–52.5)
HEMOGLOBIN: 13.7 G/DL (ref 13.5–17.5)
LYMPHOCYTES ABSOLUTE: 1 K/UL (ref 1–5.1)
LYMPHOCYTES RELATIVE PERCENT: 12.6 %
MCH RBC QN AUTO: 30.1 PG (ref 26–34)
MCHC RBC AUTO-ENTMCNC: 33.6 G/DL (ref 31–36)
MCV RBC AUTO: 89.7 FL (ref 80–100)
MONOCYTES ABSOLUTE: 0.9 K/UL (ref 0–1.3)
MONOCYTES RELATIVE PERCENT: 10.9 %
NEUTROPHILS ABSOLUTE: 6.1 K/UL (ref 1.7–7.7)
NEUTROPHILS RELATIVE PERCENT: 75.7 %
PDW BLD-RTO: 13.3 % (ref 12.4–15.4)
PHOSPHORUS: 4.1 MG/DL (ref 2.5–4.9)
PLATELET # BLD: 278 K/UL (ref 135–450)
PMV BLD AUTO: 7.4 FL (ref 5–10.5)
POTASSIUM SERPL-SCNC: 4.9 MMOL/L (ref 3.5–5.1)
RBC # BLD: 4.56 M/UL (ref 4.2–5.9)
REASON FOR REJECTION: NORMAL
REJECTED TEST: NORMAL
SODIUM BLD-SCNC: 135 MMOL/L (ref 136–145)
WBC # BLD: 8.1 K/UL (ref 4–11)

## 2022-01-21 PROCEDURE — 85025 COMPLETE CBC W/AUTO DIFF WBC: CPT

## 2022-01-21 PROCEDURE — 1200000000 HC SEMI PRIVATE

## 2022-01-21 PROCEDURE — 86140 C-REACTIVE PROTEIN: CPT

## 2022-01-21 PROCEDURE — 2580000003 HC RX 258: Performed by: INTERNAL MEDICINE

## 2022-01-21 PROCEDURE — 94761 N-INVAS EAR/PLS OXIMETRY MLT: CPT

## 2022-01-21 PROCEDURE — 6360000002 HC RX W HCPCS: Performed by: INTERNAL MEDICINE

## 2022-01-21 PROCEDURE — 36415 COLL VENOUS BLD VENIPUNCTURE: CPT

## 2022-01-21 PROCEDURE — 2700000000 HC OXYGEN THERAPY PER DAY

## 2022-01-21 PROCEDURE — 94640 AIRWAY INHALATION TREATMENT: CPT

## 2022-01-21 PROCEDURE — 80069 RENAL FUNCTION PANEL: CPT

## 2022-01-21 PROCEDURE — 6370000000 HC RX 637 (ALT 250 FOR IP): Performed by: INTERNAL MEDICINE

## 2022-01-21 PROCEDURE — 99233 SBSQ HOSP IP/OBS HIGH 50: CPT | Performed by: INTERNAL MEDICINE

## 2022-01-21 RX ORDER — ZINC SULFATE 50(220)MG
50 CAPSULE ORAL EVERY EVENING
Status: DISCONTINUED | OUTPATIENT
Start: 2022-01-21 | End: 2022-01-29 | Stop reason: HOSPADM

## 2022-01-21 RX ORDER — ASCORBIC ACID 500 MG
500 TABLET ORAL DAILY
Status: DISCONTINUED | OUTPATIENT
Start: 2022-01-22 | End: 2022-01-29 | Stop reason: HOSPADM

## 2022-01-21 RX ORDER — GUAIFENESIN 600 MG/1
600 TABLET, EXTENDED RELEASE ORAL 2 TIMES DAILY
Status: DISCONTINUED | OUTPATIENT
Start: 2022-01-22 | End: 2022-01-29 | Stop reason: HOSPADM

## 2022-01-21 RX ORDER — TRAZODONE HYDROCHLORIDE 50 MG/1
25 TABLET ORAL NIGHTLY
Status: DISCONTINUED | OUTPATIENT
Start: 2022-01-21 | End: 2022-01-29 | Stop reason: HOSPADM

## 2022-01-21 RX ADMIN — TRAZODONE HYDROCHLORIDE 25 MG: 50 TABLET ORAL at 21:16

## 2022-01-21 RX ADMIN — Medication 200 MG: at 09:42

## 2022-01-21 RX ADMIN — OXYCODONE HYDROCHLORIDE 15 MG: 5 TABLET ORAL at 04:00

## 2022-01-21 RX ADMIN — SODIUM CHLORIDE, PRESERVATIVE FREE 5 ML: 5 INJECTION INTRAVENOUS at 21:18

## 2022-01-21 RX ADMIN — METHYLPREDNISOLONE SODIUM SUCCINATE 40 MG: 40 INJECTION, POWDER, LYOPHILIZED, FOR SOLUTION INTRAMUSCULAR; INTRAVENOUS at 18:08

## 2022-01-21 RX ADMIN — BARICITINIB 4 MG: 2 TABLET, FILM COATED ORAL at 09:50

## 2022-01-21 RX ADMIN — METHYLPREDNISOLONE SODIUM SUCCINATE 40 MG: 40 INJECTION, POWDER, LYOPHILIZED, FOR SOLUTION INTRAMUSCULAR; INTRAVENOUS at 06:10

## 2022-01-21 RX ADMIN — ENOXAPARIN SODIUM 30 MG: 30 INJECTION SUBCUTANEOUS at 21:16

## 2022-01-21 RX ADMIN — OXYCODONE HYDROCHLORIDE 15 MG: 5 TABLET ORAL at 09:42

## 2022-01-21 RX ADMIN — FAMOTIDINE 20 MG: 20 TABLET ORAL at 21:16

## 2022-01-21 RX ADMIN — Medication 2 PUFF: at 19:50

## 2022-01-21 RX ADMIN — ENOXAPARIN SODIUM 30 MG: 30 INJECTION SUBCUTANEOUS at 09:42

## 2022-01-21 RX ADMIN — Medication 2 PUFF: at 08:08

## 2022-01-21 RX ADMIN — Medication 3 MG: at 21:16

## 2022-01-21 RX ADMIN — ASPIRIN 81 MG: 81 TABLET, COATED ORAL at 09:42

## 2022-01-21 RX ADMIN — OXYCODONE HYDROCHLORIDE 15 MG: 5 TABLET ORAL at 14:40

## 2022-01-21 RX ADMIN — OXYCODONE HYDROCHLORIDE 15 MG: 5 TABLET ORAL at 22:47

## 2022-01-21 RX ADMIN — FAMOTIDINE 20 MG: 20 TABLET ORAL at 09:42

## 2022-01-21 RX ADMIN — OXYCODONE HYDROCHLORIDE 15 MG: 5 TABLET ORAL at 18:08

## 2022-01-21 ASSESSMENT — PAIN SCALES - GENERAL
PAINLEVEL_OUTOF10: 7
PAINLEVEL_OUTOF10: 8
PAINLEVEL_OUTOF10: 7
PAINLEVEL_OUTOF10: 7
PAINLEVEL_OUTOF10: 8

## 2022-01-21 NOTE — CARE COORDINATION
1/21/22 Pt independent prior to admission, here for Covid, on 15LNC and NRB today, following for needs and possible new home o2 on d/c.

## 2022-01-21 NOTE — PROGRESS NOTES
CMU called stating patients 02 sat is low. Nurse at bedside. Patients 02 sat is maintaining 82-85%. Patient is on 30L of oxygen. Patient placed in a prone position, respiratory therapist called to come assess pt. Nurse remains at bedside.

## 2022-01-21 NOTE — CONSULTS
INPATIENT PULMONARY CRITICAL CARE CONSULT NOTE      Chief Complaint/Referring Provider:  Patient is being seen at the request of [facility] for a consultation for respiratory failure, COVID, increasing oxygen requirements     Presenting HPI: Patient came to the hospital because of increasing shortness of breath    As per the admitting provider-Pt is an 78y.o. year-old male with a history of COPD who presents to the emergency room for evaluation following a 2-week history of shortness of breath, and intermittent nonproductive cough and intermittent episodes of emesis. The emesis is nonbilious nonbloody. In the emergency room he was found to have an oxygen saturation of 83% on room air. He had a rapid COVID test performed which was negative. However, imaging has findings suggestive of a COVID-19 pneumonia. He is being admitted for further evaluation and treatment. Associated signs and symptoms do not include fever or chills, abdominal pain, hemoptysis, hematochezia, diarrhea, constipation or urinary symptoms.   Patient's clinical status during the course of hospitalization has deteriorated, patient was admitted to the hospital with 5 L nasal cannula oxygen but during the course of hospitalization patient has progressive hypoxemia, patient when evaluated was on 15 L of high flow oxygen along with 15 L of nonrebreather facemask maintain saturation which was around 93% when seen this evening, patient was having some shortness of breath, patient did not appear to have increased work of breathing, patient was afebrile and hemodynamically maintained, patient had sinus rhythm on the monitor, patient has had adequate urine output, patient's p.o. intake is acceptable, patient's cumulative fluid balance was -640 mL, patient's blood sugars were slightly on the higher side, no other pertinent review of system of concern     Patient Active Problem List    Diagnosis Date Noted    Pulmonary infiltrates 01/20/2022    Current smoker 01/20/2022    Marijuana use 01/20/2022    Elevated C-reactive protein (CRP) 01/20/2022    Overweight (BMI 25.0-29.9) 01/20/2022    Chronic prescription opiate use 01/20/2022    Acute hypoxemic respiratory failure ([facility] 01/18/2022    Pneumonia 01/18/2022    COPD (chronic obstructive pulmonary disease) ([facility] 01/18/2022    Sepsis ([facility] [facility] 01/18/2022    Tachycardia 01/18/2022    Tachypnea 01/18/2022    Elevated lactic acid level 01/18/2022       Past Medical History:   Diagnosis Date    COPD (chronic obstructive pulmonary disease) ([facility]         History reviewed. No pertinent surgical history. History reviewed. No pertinent family history. Social History     Tobacco Use    Smoking status: Current Every Day Smoker     Packs/day: 1.00     Types: Cigarettes    Smokeless tobacco: Never Used   Substance Use Topics    Alcohol use: Not Currently        Allergies   Allergen Reactions    Hydromorphone      Other reaction(s): Urticaria    Morphine      Other reaction(s): Dysuria, Headache, Retention of urine    Ciprofloxacin Rash and Hives     Other reaction(s): Eruption of skin, Urticaria               Physical Exam:  Blood pressure 121/72, pulse 93, temperature 97.6 °F (36.4 °C), temperature source Oral, resp. rate 20, height 5' 10\" (1.778 m), weight 192 lb 0.3 oz (87.1 kg), SpO2 90 %.'       In-person bedside physical examination deferred. Pursuant to the emergency declaration under the [address] [place] [address], [address] [place] [address] and the [employer] Act, this clinical encounter was conducted to provide necessary medical care.    (Also consistent with new provisions and guidance offered by [facility] on March 18, 2020 in setting of COVID 19 outbreak and in order to preserve personal protective equipment in accordance with the flexibilities announced by CMS on March 30, 2020)   References: [email or web address]/Resources/COVID-19/3_18%20Telemed%20Guidance%20Updated%20March%2018. pdf?[email or web address]                      [email or web address]/Resources/COVID-19/3_18%20Telemed%20Guidance%20Updated%20March%2018. pdf?vdb=[email or web address]                      http://[facility]/. pdf               Results:  CBC:   Recent Labs     01/18/22  1505 01/19/22  0906 01/20/22  1404   WBC 8.9 9.6 8.6   HGB 13.9 12.9* 12.6*   HCT 42.3 39.0* 37.1*   MCV 89.6 89.2 89.2    271 316     BMP:   Recent Labs     01/18/22  1505 01/19/22  0619 01/20/22  1404   * 137 137   K 4.5 4.7 4.3    106 103   CO2 21 21 22   PHOS  --   --  3.2   BUN 14 11 22*   CREATININE 0.6* <0.5* <0.5*     LIVER PROFILE:   Recent Labs     01/18/22  1505   AST 35   ALT 29   BILITOT 0.7   ALKPHOS 48       Imaging:  I have reviewed radiology images personally. XR CHEST PORTABLE   Final Result   Bilateral pulmonary opacification concerning for multifocal pneumonia   including COVID-19 pneumonia. Results for Ibeth Leon (MRN [id number]) as of 1/20/2022 23:09   Ref.  Range 1/20/2022 14:04   Sodium Latest Ref Range: 136 - 145 mmol/L 137   Potassium Latest Ref Range: 3.5 - 5.1 mmol/L 4.3   Chloride Latest Ref Range: 99 - 110 mmol/L 103   CO2 Latest Ref Range: 21 - 32 mmol/L 22   BUN Latest Ref Range: 7 - 20 mg/dL 22 (H)   Creatinine Latest Ref Range: 0.8 - 1.3 mg/dL <0.5 (L)   Anion Gap Latest Ref Range: 3 - 16  12   GFR Non- Latest Ref Range: >60  >60   GFR  Latest Ref Range: >60  >60   Glucose Latest Ref Range: 70 - 99 mg/dL 157 (H)   CALCIUM, SERUM, [id number] Latest Ref Range: 8.3 - 10.6 mg/dL 8.9   Phosphorus Latest Ref Range: 2.5 - 4.9 mg/dL 3.2   CRP Latest Ref Range: 0.0 - 5.1 mg/L 33.1 (H)   Albumin Latest Ref Range: 3.4 - 5.0 g/dL 3.2 (L)     Results for Ibeth Leon (MRN [id number]) as of 1/20/2022 23:09   Ref. Range 1/18/2022 16:30 1/18/2022 17:30 1/18/2022 17:45 1/19/2022 11:55   CULTURE, BLOOD 1 Unknown  Rpt     CULTURE, BLOOD 2 Unknown   Rpt    Culture, Blood 2 Unknown   No Growth to date. Any change in status will be called. Rapid Influenza A Ag Latest Ref Range: Negative  Negative      Rapid Influenza B Ag Latest Ref Range: Negative  Negative      RAPID INFLUENZA A/B ANTIGENS Unknown Rpt      SARS-CoV-2 Latest Ref Range: Not detected  Detected (A)      COVID-19 Unknown Rpt (A)      L. pneumophila Serogp 1 Ur Ag Unknown    Presumptive Negat. .. LEGIONELLA ANTIGEN, URINE Unknown    Rpt     Results for Sridevi Levine (MRN [id number]) as of 1/20/2022 23:09   Ref. Range 1/18/2022 15:05   Procalcitonin Latest Ref Range: 0.00 - 0.15 ng/mL 0.09     Echocardiogram: None in Epic   PFT: None in Epic         Assessment:  Active Problems:    Acute hypoxemic respiratory failure (HCC)    COPD (chronic obstructive pulmonary disease) (HCC)    Tachycardia    Tachypnea    Elevated lactic acid level    Pulmonary infiltrates    Current smoker    Marijuana use    Elevated C-reactive protein (CRP)    Overweight (BMI 25.0-29. 9)    Chronic prescription opiate use  Resolved Problems:    * No resolved hospital problems. *          Plan:   · Oxygen supplementation to keep saturation.   90-94% only  · Please titrate oxygen as per above parameters  · Patient's oxygen requirements are going up and patient was on 15 L of high flow oxygen along with nonrebreather facemask to maintain oxygen saturation  · Please monitor for any worsening respite compromise or hypoxemia  · If patient has worsening hypoxemia and is not being maintained on the current set up and patient may require Vapotherm oxygenation and nursing order was placed for that  · Patient may be required to be transferred to progressive care unit  · If patient has worsening respiratory compromise in spite of being on Vapotherm oxygenation then patient may require noninvasive/invasive ventilatory support  · Trend the inflammatory markers  · D-dimers and ferritin levels ordered  · If the D-dimers are high then will reassess about dose of Lovenox which is being given at 30 mg twice a day for now  · Bronchodilators can be continued  · IV Solu-Medrol to continue  · No role for any azithromycin Rocephin which has been discontinued  · Patient does have some hyperglycemia which may be because steroids and needs to be trended and if still on the higher side may require blood glucose monitoring with sliding scale insulin  · Patient has been started on baricitinib by the internal medicine team  · Nicotine replacement therapy if the patient wants  · PUD and DVT prophylaxis    Case discussed with nursing    Patient's clinical status is precarious and has potential for decompensation needs to monitor closely            Electronically signed by:  Tesha Unger MD    1/20/2022    11:16 PM.

## 2022-01-21 NOTE — PROGRESS NOTES
without any focal sensory/motor deficits. Cranial nerves: II-XII intact, grossly non-focal.  Psychiatric: Alert and oriented, thought content appropriate, normal insight  Capillary Refill: Brisk,3 seconds, normal   Peripheral Pulses: +2 palpable, equal bilaterally       Labs:   Recent Labs     01/19/22  0906 01/20/22  1404 01/21/22  0720   WBC 9.6 8.6 8.1   HGB 12.9* 12.6* 13.7   HCT 39.0* 37.1* 40.9    316 278     Recent Labs     01/19/22  0619 01/20/22  1404 01/21/22  0719    137 135*   K 4.7 4.3 4.9    103 106   CO2 21 22 17*   BUN 11 22* 23*   CREATININE <0.5* <0.5* 0.6*   CALCIUM 8.4 8.9 8.4   PHOS  --  3.2 4.1     Recent Labs     01/18/22  1505   AST 35   ALT 29   BILITOT 0.7   ALKPHOS 48     No results for input(s): INR in the last 72 hours. Recent Labs     01/18/22  1505   TROPONINI <0.01       Urinalysis:    No results found for: Allyson Jose, BACTERIA, RBCUA, BLOODU, SPECGRAV, GLUCOSEU    Radiology:  XR CHEST PORTABLE   Final Result   Bilateral pulmonary opacification concerning for multifocal pneumonia   including COVID-19 pneumonia. Assessment/Plan:    Active Hospital Problems    Diagnosis     Pulmonary infiltrates [R91.8]     Current smoker [F17.200]     Marijuana use [F12.90]     Elevated C-reactive protein (CRP) [R79.82]     Overweight (BMI 25.0-29. 9) [E66.3]     Chronic prescription opiate use [Z79.891]     Hyperglycemia [R73.9]     Acute hypoxemic respiratory failure (HCC) [J96.01]     COPD (chronic obstructive pulmonary disease) (HCC) [J44.9]     Tachycardia [R00.0]     Tachypnea [R06.82]     Elevated lactic acid level [R79.89]             Acute hypoxic respiratory failure, POA- likely due to [address] evidenced by respiratory distress, use of accessory muscles and O2 saturation of less than 90% on room air on presentation   - supplemental oxygen as necessary to keep SaO2 > 90%, not on O2 at home  - Dexamethasone was started. Switched to iv solumedrol 1/19  -baricitnib started 1/19   -pulm consulted given severe dz     Pneumonia, organism unspecified - Pt has been started on Rocephin and Azithromycin  - COVID-19 pneumonia suspected; Rapid COVID was neg but PCR was positive  - Pt placed in \"droplet plus\" isolation, and PPE instructions have been provided  - Rapid Influenza A&B negative  - Blood cultures x 2 ordered  - Respiratory culture ordered  - Legionella pneumophilia and Strep pneumoniae urine antigens ordered and neg  - Procalcitonin 0.09 (not elevated)  - Incentive spirometry ordered  - Low intensity Enoxaparin has been started  - Will provide high calorie, high protein dietary supplements  - Dexamethasone was started. -switched to iv solumedrol  - 25 hydroxy Vitamin D lab ordered. Will replace Vitamin D if low  - Zinc sulfate 50 mg bid ordered  - Thiamine 200 mg po daily ordered  - Pepcid 20 mg po bid     Cough- provided antitussive medications as needed     Non-Intractable vomiting with nausea - Will provide symptomatic treatment with Zofran and/or Phenergan as needed, maintenance of fluids and electrolytes     Poor appetite - improved, Patient encouraged to eat a balanced diet, including protein-rich foods. -provided high calorie, high protein dietary supplements     Generalized weakness/fatigue - Expect to improve with treatment of Pneumonia. If not, we will will ask PT/OT to evaluate and treat patient, and if necessary to provide recommendations for post hospital therapy     Sepsis ([address] due to Pneumonia with tachycardia, tachypnea. Initial Lactic Acid was elevated. - Pt will not be resuscitated with the full 30 cc/Kg IV Fluids per sepsis protocol as his BP is adequate and he is a high risk of fluid overload and catastrophic decompensation if he were to receive such a bolus.  His blood pressure will be monitored closely  -monitored lactate, normalized  - Blood cultures x 2 have been ordered      COPD (chronic obstructive pulmonary disease) (HCC) -without acute exacerbation.  We will monitor and provide breathing treatments as indicated     DVT Prophylaxis: Low intensity Enoxaparin  Diet: ADULT ORAL NUTRITION SUPPLEMENT; Lunch, Dinner; Standard High Calorie/High Protein Oral Supplement  ADULT DIET;  Regular  Code Status: Full Code      PT/OT Eval Status: not ordered     Dispo - pending workup, unclear, pulm  Recs, improved oxygenation, ?likely here for another week , added trazodone for sleep    Sarahbiquan Lewis MD

## 2022-01-21 NOTE — PROGRESS NOTES
Patient has no IV access. Charge RN notified and attempted IV with no success. Clinical called, IVs successfully placed @ 2030.

## 2022-01-22 LAB
ALBUMIN SERPL-MCNC: 2.9 G/DL (ref 3.4–5)
ANION GAP SERPL CALCULATED.3IONS-SCNC: 8 MMOL/L (ref 3–16)
BASOPHILS ABSOLUTE: 0 K/UL (ref 0–0.2)
BASOPHILS RELATIVE PERCENT: 0.1 %
BLOOD CULTURE, ROUTINE: NORMAL
BUN BLDV-MCNC: 28 MG/DL (ref 7–20)
C-REACTIVE PROTEIN: 14.4 MG/L (ref 0–5.1)
CALCIUM SERPL-MCNC: 8.7 MG/DL (ref 8.3–10.6)
CHLORIDE BLD-SCNC: 102 MMOL/L (ref 99–110)
CO2: 25 MMOL/L (ref 21–32)
CREAT SERPL-MCNC: <0.5 MG/DL (ref 0.8–1.3)
CULTURE, BLOOD 2: NORMAL
EOSINOPHILS ABSOLUTE: 0 K/UL (ref 0–0.6)
EOSINOPHILS RELATIVE PERCENT: 0 %
GFR AFRICAN AMERICAN: >60
GFR NON-AFRICAN AMERICAN: >60
GLUCOSE BLD-MCNC: 111 MG/DL (ref 70–99)
HCT VFR BLD CALC: 40.4 % (ref 40.5–52.5)
HEMOGLOBIN: 13.2 G/DL (ref 13.5–17.5)
LYMPHOCYTES ABSOLUTE: 0.9 K/UL (ref 1–5.1)
LYMPHOCYTES RELATIVE PERCENT: 9.6 %
MCH RBC QN AUTO: 29.9 PG (ref 26–34)
MCHC RBC AUTO-ENTMCNC: 32.7 G/DL (ref 31–36)
MCV RBC AUTO: 91.4 FL (ref 80–100)
MONOCYTES ABSOLUTE: 0.9 K/UL (ref 0–1.3)
MONOCYTES RELATIVE PERCENT: 9.8 %
NEUTROPHILS ABSOLUTE: 7.2 K/UL (ref 1.7–7.7)
NEUTROPHILS RELATIVE PERCENT: 80.5 %
PDW BLD-RTO: 13.7 % (ref 12.4–15.4)
PHOSPHORUS: 3.8 MG/DL (ref 2.5–4.9)
PLATELET # BLD: 268 K/UL (ref 135–450)
PLATELET SLIDE REVIEW: ADEQUATE
PMV BLD AUTO: 7.1 FL (ref 5–10.5)
POTASSIUM SERPL-SCNC: 5.3 MMOL/L (ref 3.5–5.1)
RBC # BLD: 4.42 M/UL (ref 4.2–5.9)
SLIDE REVIEW: ABNORMAL
SODIUM BLD-SCNC: 135 MMOL/L (ref 136–145)
WBC # BLD: 8.9 K/UL (ref 4–11)

## 2022-01-22 PROCEDURE — 36415 COLL VENOUS BLD VENIPUNCTURE: CPT

## 2022-01-22 PROCEDURE — 86140 C-REACTIVE PROTEIN: CPT

## 2022-01-22 PROCEDURE — 85025 COMPLETE CBC W/AUTO DIFF WBC: CPT

## 2022-01-22 PROCEDURE — 80069 RENAL FUNCTION PANEL: CPT

## 2022-01-22 PROCEDURE — 6370000000 HC RX 637 (ALT 250 FOR IP): Performed by: INTERNAL MEDICINE

## 2022-01-22 PROCEDURE — 6360000002 HC RX W HCPCS: Performed by: INTERNAL MEDICINE

## 2022-01-22 PROCEDURE — 99232 SBSQ HOSP IP/OBS MODERATE 35: CPT | Performed by: INTERNAL MEDICINE

## 2022-01-22 PROCEDURE — 94761 N-INVAS EAR/PLS OXIMETRY MLT: CPT

## 2022-01-22 PROCEDURE — 1200000000 HC SEMI PRIVATE

## 2022-01-22 PROCEDURE — 2700000000 HC OXYGEN THERAPY PER DAY

## 2022-01-22 PROCEDURE — 94640 AIRWAY INHALATION TREATMENT: CPT

## 2022-01-22 PROCEDURE — 2580000003 HC RX 258: Performed by: INTERNAL MEDICINE

## 2022-01-22 RX ADMIN — OXYCODONE HYDROCHLORIDE AND ACETAMINOPHEN 500 MG: 500 TABLET ORAL at 08:45

## 2022-01-22 RX ADMIN — ACETAMINOPHEN 650 MG: 325 TABLET ORAL at 22:05

## 2022-01-22 RX ADMIN — ZINC SULFATE 220 MG (50 MG) CAPSULE 50 MG: CAPSULE at 17:48

## 2022-01-22 RX ADMIN — SODIUM CHLORIDE, PRESERVATIVE FREE 10 ML: 5 INJECTION INTRAVENOUS at 08:49

## 2022-01-22 RX ADMIN — Medication 2 PUFF: at 08:20

## 2022-01-22 RX ADMIN — OXYCODONE HYDROCHLORIDE 15 MG: 5 TABLET ORAL at 08:44

## 2022-01-22 RX ADMIN — ENOXAPARIN SODIUM 90 MG: 100 INJECTION SUBCUTANEOUS at 22:07

## 2022-01-22 RX ADMIN — METHYLPREDNISOLONE SODIUM SUCCINATE 40 MG: 40 INJECTION, POWDER, LYOPHILIZED, FOR SOLUTION INTRAMUSCULAR; INTRAVENOUS at 05:40

## 2022-01-22 RX ADMIN — FAMOTIDINE 20 MG: 20 TABLET ORAL at 08:45

## 2022-01-22 RX ADMIN — Medication 3 MG: at 22:05

## 2022-01-22 RX ADMIN — GUAIFENESIN 600 MG: 600 TABLET, EXTENDED RELEASE ORAL at 08:44

## 2022-01-22 RX ADMIN — Medication 2 PUFF: at 20:15

## 2022-01-22 RX ADMIN — OXYCODONE HYDROCHLORIDE 15 MG: 5 TABLET ORAL at 17:48

## 2022-01-22 RX ADMIN — BARICITINIB 4 MG: 2 TABLET, FILM COATED ORAL at 08:45

## 2022-01-22 RX ADMIN — ENOXAPARIN SODIUM 90 MG: 100 INJECTION SUBCUTANEOUS at 08:44

## 2022-01-22 RX ADMIN — SODIUM CHLORIDE, PRESERVATIVE FREE 10 ML: 5 INJECTION INTRAVENOUS at 22:06

## 2022-01-22 RX ADMIN — Medication 200 MG: at 08:44

## 2022-01-22 RX ADMIN — OXYCODONE HYDROCHLORIDE 15 MG: 5 TABLET ORAL at 12:47

## 2022-01-22 RX ADMIN — TRAZODONE HYDROCHLORIDE 25 MG: 50 TABLET ORAL at 22:06

## 2022-01-22 RX ADMIN — FAMOTIDINE 20 MG: 20 TABLET ORAL at 22:05

## 2022-01-22 RX ADMIN — OXYCODONE HYDROCHLORIDE 15 MG: 5 TABLET ORAL at 02:40

## 2022-01-22 RX ADMIN — GUAIFENESIN 600 MG: 600 TABLET, EXTENDED RELEASE ORAL at 22:05

## 2022-01-22 RX ADMIN — ASPIRIN 81 MG: 81 TABLET, COATED ORAL at 08:45

## 2022-01-22 RX ADMIN — METHYLPREDNISOLONE SODIUM SUCCINATE 40 MG: 40 INJECTION, POWDER, LYOPHILIZED, FOR SOLUTION INTRAMUSCULAR; INTRAVENOUS at 17:48

## 2022-01-22 RX ADMIN — OXYCODONE HYDROCHLORIDE 15 MG: 5 TABLET ORAL at 22:06

## 2022-01-22 ASSESSMENT — PAIN SCALES - GENERAL
PAINLEVEL_OUTOF10: 7
PAINLEVEL_OUTOF10: 5
PAINLEVEL_OUTOF10: 7
PAINLEVEL_OUTOF10: 8
PAINLEVEL_OUTOF10: 7
PAINLEVEL_OUTOF10: 7

## 2022-01-22 ASSESSMENT — PAIN DESCRIPTION - LOCATION
LOCATION: BACK
LOCATION: NECK
LOCATION: BACK;NECK

## 2022-01-22 ASSESSMENT — PAIN DESCRIPTION - PAIN TYPE
TYPE: CHRONIC PAIN
TYPE: CHRONIC PAIN

## 2022-01-22 NOTE — PROGRESS NOTES
Hospitalist Progress Note      PCP: No primary care provider on file. Date of Admission: 1/18/2022      Chief Complaint: sob     Hospital Course:  reviewed      Subjective: resting in bed, remains on 15 HFNC, notes ongoing sob , sleeping a bit better, eating well       Medications:  Reviewed    Infusion Medications    sodium chloride 25 mL (01/19/22 1653)     Scheduled Medications    traZODone  25 mg Oral Nightly    zinc sulfate  50 mg Oral QPM    ascorbic acid  500 mg Oral Daily    guaiFENesin  600 mg Oral BID    enoxaparin  1 mg/kg SubCUTAneous BID    baricitinib  4 mg Oral Daily    methylPREDNISolone  40 mg IntraVENous Q12H    aspirin EC  81 mg Oral Daily    budesonide-formoterol  2 puff Inhalation BID    sodium chloride flush  5-40 mL IntraVENous 2 times per day    nicotine  1 patch TransDERmal Daily    famotidine  20 mg Oral BID    melatonin  3 mg Oral Nightly    thiamine  200 mg Oral Daily     PRN Meds: oxyCODONE, sodium chloride flush, sodium chloride, polyethylene glycol, acetaminophen **OR** acetaminophen, prochlorperazine, albuterol sulfate HFA, benzonatate      Intake/Output Summary (Last 24 hours) at 1/22/2022 1151  Last data filed at 1/22/2022 0915  Gross per 24 hour   Intake 240 ml   Output 850 ml   Net -610 ml       Physical Exam Performed:    /70   Pulse 64   Temp 97.4 °F (36.3 °C) (Axillary)   Resp 18   Ht 5' 10\" (1.778 m)   Wt 191 lb 5.8 oz (86.8 kg)   SpO2 91%   BMI 27.46 kg/m²       General appearance: No apparent distress, appears stated age and cooperative. HEENT: Pupils equal, round, and reactive to light. Conjunctivae/corneas clear. Neck: Supple, with full range of motion. No jugular venous distention. Trachea midline. Respiratory:  Normal respiratory effort. Clear to auscultation, bilaterally without Wheezes/Rhonchi. bibas rales noted  Cardiovascular: Regular rate and rhythm with normal S1/S2 without murmurs, rubs or gallops.   Abdomen: Soft, non-tender, non-distended with normal bowel sounds. Musculoskeletal: No clubbing, cyanosis or edema bilaterally.  Full range of motion without deformity. Skin: Skin color, texture, turgor normal.  No rashes or lesions. Neurologic:  Neurovascularly intact without any focal sensory/motor deficits. Cranial nerves: II-XII intact, grossly non-focal.  Psychiatric: Alert and oriented, thought content appropriate, normal insight  Capillary Refill: Brisk,3 seconds, normal   Peripheral Pulses: +2 palpable, equal bilaterally        Labs:   Recent Labs     01/20/22  1404 01/21/22  0720 01/22/22  0656   WBC 8.6 8.1 8.9   HGB 12.6* 13.7 13.2*   HCT 37.1* 40.9 40.4*    278 268     Recent Labs     01/20/22  1404 01/21/22  0719 01/22/22  0656    135* 135*   K 4.3 4.9 5.3*    106 102   CO2 22 17* 25   BUN 22* 23* 28*   CREATININE <0.5* 0.6* <0.5*   CALCIUM 8.9 8.4 8.7   PHOS 3.2 4.1 3.8     No results for input(s): AST, ALT, BILIDIR, BILITOT, ALKPHOS in the last 72 hours. No results for input(s): INR in the last 72 hours. No results for input(s): Jacqueline Edwin in the last 72 hours. Urinalysis:    No results found for: Gray Bridegroom, BACTERIA, RBCUA, BLOODU, [address]    Radiology:  XR CHEST PORTABLE   Final Result   Bilateral pulmonary opacification concerning for multifocal pneumonia   including COVID-19 pneumonia. Assessment/Plan:    Active Hospital Problems    Diagnosis     Elevated d-dimer [R79.89]     Elevated ferritin level [R79.89]     Pulmonary infiltrates [R91.8]     Current smoker [F17.200]     Marijuana use [F12.90]     Elevated C-reactive protein (CRP) [R79.82]     Overweight (BMI 25.0-29. 9) [E66.3]     Chronic prescription opiate use [Z79.891]     Hyperglycemia [R73.9]     Acute hypoxemic respiratory failure (HCC) [J96.01]     COPD (chronic obstructive pulmonary disease) (HCC) [J44.9]     Tachycardia [R00.0]     Tachypnea [R06.82]     Elevated lactic acid level [R79.89]               Acute hypoxic respiratory failure, POA- likely due to [address] evidenced by respiratory distress, use of accessory muscles and O2 saturation of less than 90% on room air on presentation   - supplemental oxygen as necessary to keep SaO2 > 90%, not on O2 at home  - Dexamethasone was started. Switched to iv solumedrol 1/19  -baricitnib started 1/19   -pulm consulted given severe dz     Pneumonia, organism unspecified - Pt has been started on Rocephin and Azithromycin  - COVID-19 pneumonia suspected; Rapid COVID was neg but PCR was positive  - Pt placed in \"droplet plus\" isolation, and PPE instructions have been provided  - Rapid Influenza A&B negative  - Blood cultures x 2 ordered  - Respiratory culture ordered  - Legionella pneumophilia and Strep pneumoniae urine antigens ordered and neg  - Procalcitonin 0.09 (not elevated)  - Incentive spirometry ordered  - Low intensity Enoxaparin has been started  - Will provide high calorie, high protein dietary supplements  - Dexamethasone was started. -switched to iv solumedrol  - 25 hydroxy Vitamin D lab ordered. Will replace Vitamin D if low  - Zinc sulfate 50 mg bid ordered  - Thiamine 200 mg po daily ordered  - Pepcid 20 mg po bid     Cough- provided antitussive medications as needed     Non-Intractable vomiting with nausea - Will provide symptomatic treatment with Zofran and/or Phenergan as needed, maintenance of fluids and electrolytes     Poor appetite - improved, Patient encouraged to eat a balanced diet, including protein-rich foods.   -provided high calorie, high protein dietary supplements     Generalized weakness/fatigue - Expect to improve with treatment of Pneumonia. If not, we will will ask PT/OT to evaluate and treat patient, and if necessary to provide recommendations for post hospital therapy     Sepsis ([facility] [address] due to Pneumonia with tachycardia, tachypnea. Initial Lactic Acid was elevated.    - Pt will not be resuscitated with the full 30 cc/Kg IV Fluids per sepsis protocol as his BP is adequate and he is a high risk of fluid overload and catastrophic decompensation if he were to receive such a bolus. His blood pressure will be monitored closely  -monitored lactate, normalized  - Blood cultures x 2 have been ordered      COPD (chronic obstructive pulmonary disease) (HCC) -without acute exacerbation.  We will monitor and provide breathing treatments as indicated     DVT Prophylaxis: Low intensity Enoxaparin  Diet: ADULT ORAL NUTRITION SUPPLEMENT; Lunch, Dinner; Standard High Calorie/High Protein Oral Supplement  ADULT DIET;  Regular  Code Status: Full Code      PT/OT Eval Status: not ordered     Dispo - pending workup, unclear, pulm recs, improved oxygenation, ?likely here for another week ,     Francisco Javier Mayorga MD

## 2022-01-22 NOTE — PROGRESS NOTES
INPATIENT PULMONARY CRITICAL CARE PROGRESS NOTE      Reason for visit    respiratory failure, COVID, increasing oxygen requirements    SUBJECTIVE: Patient when evaluated was having profound shortness of breath and patient continues to be having significant hypoxemia, patient was on 15 L of high flow oxygen along with 15 L of nonrebreather facemask to maintain oxygen saturation, patient was having slightly improved work of breathing,Patient was afebrile and hemodynamically maintained, patient's p.o. intake has been on lower side, patient's urine output is as documented was borderline with cumulative fluid balance of -1.2 L , patient's glycemic control was acceptable, no other pertinent review of system of concern             Physical Exam:  Blood pressure 109/71, pulse 78, temperature 97.5 °F (36.4 °C), temperature source Oral, resp. rate 18, height 5' 10\" (1.778 m), weight 191 lb 5.8 oz (86.8 kg), SpO2 91 %.'         In-person bedside physical examination deferred. Pursuant to the emergency declaration under the [address] [place] [address], [address] [place] [address] and the [employer], this clinical encounter was conducted to provide necessary medical care. (Also consistent with new provisions and guidance offered by [facility] on March 18, 2020 in setting of COVID 19 outbreak and in order to preserve personal protective equipment in accordance with the flexibilities announced by CMS on March 30, 2020)   References: https://[email or web address]. [facility]/Portals/0/Resources/COVID-19/3_18%20Telemed%20Guidance%20Updated%20March%2018. pdf?[email or web address]                      https://[facility]. [facility]/Portals/0/Resources/COVID-19/3_18%20Telemed%20Guidance%20Updated%20March%2018. pdf?oto=[email or web address]                      [email or web address]             Results:  CBC:   Recent Labs 01/20/22  1404 01/21/22  0720 01/22/22  0656   WBC 8.6 8.1 8.9   HGB 12.6* 13.7 13.2*   HCT 37.1* 40.9 40.4*   MCV 89.2 89.7 91.4    278 268     BMP:   Recent Labs     01/20/22  1404 01/21/22  0719 01/22/22  0656    135* 135*   K 4.3 4.9 5.3*    106 102   CO2 22 17* 25   PHOS 3.2 4.1 3.8   BUN 22* 23* 28*   CREATININE <0.5* 0.6* <0.5*       Imaging:  I have reviewed radiology images personally. XR CHEST PORTABLE   Final Result   Bilateral pulmonary opacification concerning for multifocal pneumonia   including COVID-19 pneumonia. Assessment:  Active Problems:    Acute hypoxemic respiratory failure (HCC)    COPD (chronic obstructive pulmonary disease) (HCC)    Tachycardia    Tachypnea    Elevated lactic acid level    Pulmonary infiltrates    Current smoker    Marijuana use    Elevated C-reactive protein (CRP)    Overweight (BMI 25.0-29. 9)    Chronic prescription opiate use    Hyperglycemia    Elevated d-dimer    Elevated ferritin level  Resolved Problems:    * No resolved hospital problems. *          Plan:   · Oxygen supplementation to keep saturation.   90-94% only  · Please titrate oxygen as per above parameters  · Patient's oxygen requirements are going up and patient was on 15 L of high flow oxygen along with nonrebreather facemask to maintain oxygen saturation  · Please monitor for any worsening respiratory compromise or hypoxemia  · If patient has worsening hypoxemia and is not being maintained on the current set up and patient may require Vapotherm oxygenation and nursing order was placed for that  · Patient may be required to be transferred to progressive care unit  · If patient has worsening respiratory compromise in spite of being on Vapotherm oxygenation then patient may require noninvasive/invasive ventilatory support  · Trend the inflammatory markers  · D-dimers are significantly elevated and patient's Lovenox was increased to 1 mg/kg body weight twice a day  · Patient also has elevated ferritin levels-patient was started on baricitinib by internal medicine team  · Bronchodilators can be continued  · IV Solu-Medrol to continue  · Nicotine replacement therapy if the patient wants  · PUD and DVT prophylaxis        Patient's clinical status is precarious and has potential for decompensation needs to monitor closely        Electronically signed by:  Araceli Weeks MD    1/22/2022    1:18 PM.

## 2022-01-22 NOTE — PROGRESS NOTES
INPATIENT PULMONARY CRITICAL CARE PROGRESS NOTE      Reason for visit    respiratory failure, COVID, increasing oxygen requirements    SUBJECTIVE: Patient when evaluated was having profound shortness of breath and patient continues to be having significant hypoxemia, patient was on 15 L of high flow oxygen along with 15 L of nonrebreather facemask to maintain oxygen saturation, patient was having increased work of breathing,  Patient was afebrile and hemodynamically maintained, patient's p.o. intake has been on lower side, patient's urine output is as documented was only 450 mL, patient's glycemic control was acceptable, no other pertinent review of system of concern             Physical Exam:  Blood pressure 118/78, pulse 84, temperature 98.5 °F (36.9 °C), resp. rate 19, height 5' 10\" (1.778 m), weight 192 lb 0.3 oz (87.1 kg), SpO2 94 %.'     Constitutional: In distinct respiratory distress with increased work of breathing when evaluated. HENT:  Oropharynx is clear and moist. No thyromegaly. Eyes:  Conjunctivae are normal. Pupils equal, round, and reactive to light. No scleral icterus. Neck: . No tracheal deviation present. No obvious thyroid mass. Cardiovascular: Normal rate, regular rhythm, normal heart sounds. No right ventricular heave. No lower extremity edema. Pulmonary/Chest: No wheezes. Bilateral rales. Chest wall is not dull to percussion. Some accessory muscle usage or stridor. Decreased breath sound intensity  Abdominal: Soft. Bowel sounds present. No distension or hernia. No tenderness. Musculoskeletal: No cyanosis. No clubbing. No obvious joint deformity. Lymphadenopathy: No cervical or supraclavicular adenopathy. Skin: Skin is warm and dry. No rash or nodules on the exposed extremities. Psychiatric: Normal mood and affect. Behavior is normal.  No anxiety. Neurologic: Alert, awake and oriented. PERRL.   Looking tired        Results:  CBC:   Recent Labs     01/19/22  0906 01/20/22  1404 01/21/22  0720   WBC 9.6 8.6 8.1   HGB 12.9* 12.6* 13.7   HCT 39.0* 37.1* 40.9   MCV 89.2 89.2 89.7    316 278     BMP:   Recent Labs     01/19/22  0619 01/20/22  1404 01/21/22  0719    137 135*   K 4.7 4.3 4.9    103 106   CO2 21 22 17*   PHOS  --  3.2 4.1   BUN 11 22* 23*   CREATININE <0.5* <0.5* 0.6*       Imaging:  I have reviewed radiology images personally. XR CHEST PORTABLE   Final Result   Bilateral pulmonary opacification concerning for multifocal pneumonia   including COVID-19 pneumonia. XR CHEST PORTABLE    Result Date: 1/18/2022  EXAMINATION: ONE XRAY VIEW OF THE CHEST 1/18/2022 3:14 pm COMPARISON: None. HISTORY: ORDERING SYSTEM PROVIDED HISTORY: SOB TECHNOLOGIST PROVIDED HISTORY: Reason for exam:->SOB Reason for Exam: sob FINDINGS: Bilateral pulmonary opacification, most extensive in the lung bases in the mid left lung peripherally. The upper lung fields are relatively clear. There is no pleural fluid or pneumothorax. Linear atelectasis or scarring hip the right lung base. The cardiac silhouette is not enlarged. Postoperative clips overlying the left hilum and in the left axilla. Previous ORIF of left clavicle fracture. Bilateral pulmonary opacification concerning for multifocal pneumonia including COVID-19 pneumonia. Results for Vicci Shear (MRN [id number]) as of 1/21/2022 23:08   Ref. Range 1/20/2022 23:52   D-Dimer, Quant Latest Ref Range: 0 - 229 ng/mL DDU 1660 (H)     Results for Vicci Shear (MRN [id number]) as of 1/21/2022 23:11   Ref. Range 1/20/2022 23:52   Ferritin Latest Ref Range: 30.0 - 400.0 ng/mL 495.4 (H)     Results for Vicci Shear (MRN [id number]) as of 1/21/2022 23:11   Ref.  Range 1/18/2022 15:05 1/20/2022 14:04 1/21/2022 09:38   CRP Latest Ref Range: 0.0 - 5.1 mg/L 100.4 (H) 33.1 (H) 15.7 (H)       Assessment:  Active Problems:    Acute hypoxemic respiratory failure (HCC)    COPD (chronic obstructive pulmonary disease) ([facility] [address]    Tachycardia    Tachypnea    Elevated lactic acid level    Pulmonary infiltrates    Current smoker    Marijuana use    Elevated C-reactive protein (CRP)    Overweight (BMI 25.0-29. 9)    Chronic prescription opiate use    Hyperglycemia    Elevated d-dimer    Elevated ferritin level  Resolved Problems:    * No resolved hospital problems. *          Plan:   · Oxygen supplementation to keep saturation.   90-94% only  · Please titrate oxygen as per above parameters  · Patient's oxygen requirements are going up and patient was on 15 L of high flow oxygen along with nonrebreather facemask to maintain oxygen saturation  · Patient also has been having increased work of breathing  · Please monitor for any worsening respiratory compromise or hypoxemia  · If patient has worsening hypoxemia and is not being maintained on the current set up and patient may require Vapotherm oxygenation and nursing order was placed for that  · Patient may be required to be transferred to progressive care unit  · If patient has worsening respiratory compromise in spite of being on Vapotherm oxygenation then patient may require noninvasive/invasive ventilatory support  · Trend the inflammatory markers  · D-dimers are significantly elevated and patient's Lovenox was increased to 1 mg/kg body weight twice a day  · Patient also has elevated ferritin levels-patient was started on baricitinib by internal medicine team  · Bronchodilators can be continued  · IV Solu-Medrol to continue  · Patient does have some hyperglycemia which may be because steroids and needs to be trended and if still on the higher side may require blood glucose monitoring with sliding scale insulin  · Patient has been started on baricitinib by the internal medicine team  · Nicotine replacement therapy if the patient wants  · PUD and DVT prophylaxis     Case discussed with internal medicine     Patient's clinical status is precarious and has potential for decompensation needs to monitor closely  Is possible that patient may have to be transferred to a higher level of care given the patient's clinical status which appears to be deteriorating            Electronically signed by:  Chandni Hawkins MD    1/21/2022    11:12 PM.

## 2022-01-23 LAB
ALBUMIN SERPL-MCNC: 3.6 G/DL (ref 3.4–5)
ANION GAP SERPL CALCULATED.3IONS-SCNC: 15 MMOL/L (ref 3–16)
BASOPHILS ABSOLUTE: 0 K/UL (ref 0–0.2)
BASOPHILS RELATIVE PERCENT: 0.1 %
BUN BLDV-MCNC: 27 MG/DL (ref 7–20)
C-REACTIVE PROTEIN: 5.4 MG/L (ref 0–5.1)
CALCIUM SERPL-MCNC: 7.8 MG/DL (ref 8.3–10.6)
CHLORIDE BLD-SCNC: 102 MMOL/L (ref 99–110)
CO2: 22 MMOL/L (ref 21–32)
CREAT SERPL-MCNC: 0.6 MG/DL (ref 0.8–1.3)
EOSINOPHILS ABSOLUTE: 0 K/UL (ref 0–0.6)
EOSINOPHILS RELATIVE PERCENT: 0 %
GFR AFRICAN AMERICAN: >60
GFR NON-AFRICAN AMERICAN: >60
GLUCOSE BLD-MCNC: 96 MG/DL (ref 70–99)
HCT VFR BLD CALC: 38.5 % (ref 40.5–52.5)
HEMOGLOBIN: 12.3 G/DL (ref 13.5–17.5)
LYMPHOCYTES ABSOLUTE: 0.9 K/UL (ref 1–5.1)
LYMPHOCYTES RELATIVE PERCENT: 12.3 %
MCH RBC QN AUTO: 29.2 PG (ref 26–34)
MCHC RBC AUTO-ENTMCNC: 32.1 G/DL (ref 31–36)
MCV RBC AUTO: 91.2 FL (ref 80–100)
MONOCYTES ABSOLUTE: 0.6 K/UL (ref 0–1.3)
MONOCYTES RELATIVE PERCENT: 9.1 %
NEUTROPHILS ABSOLUTE: 5.5 K/UL (ref 1.7–7.7)
NEUTROPHILS RELATIVE PERCENT: 78.5 %
PDW BLD-RTO: 13.6 % (ref 12.4–15.4)
PHOSPHORUS: 3.8 MG/DL (ref 2.5–4.9)
PLATELET # BLD: 253 K/UL (ref 135–450)
PMV BLD AUTO: 6.8 FL (ref 5–10.5)
POTASSIUM SERPL-SCNC: 4.6 MMOL/L (ref 3.5–5.1)
RBC # BLD: 4.22 M/UL (ref 4.2–5.9)
SODIUM BLD-SCNC: 139 MMOL/L (ref 136–145)
WBC # BLD: 7 K/UL (ref 4–11)

## 2022-01-23 PROCEDURE — 2580000003 HC RX 258: Performed by: INTERNAL MEDICINE

## 2022-01-23 PROCEDURE — 6360000002 HC RX W HCPCS: Performed by: INTERNAL MEDICINE

## 2022-01-23 PROCEDURE — 99232 SBSQ HOSP IP/OBS MODERATE 35: CPT | Performed by: INTERNAL MEDICINE

## 2022-01-23 PROCEDURE — 6370000000 HC RX 637 (ALT 250 FOR IP): Performed by: INTERNAL MEDICINE

## 2022-01-23 PROCEDURE — 85025 COMPLETE CBC W/AUTO DIFF WBC: CPT

## 2022-01-23 PROCEDURE — 1200000000 HC SEMI PRIVATE

## 2022-01-23 PROCEDURE — 36415 COLL VENOUS BLD VENIPUNCTURE: CPT

## 2022-01-23 PROCEDURE — 97110 THERAPEUTIC EXERCISES: CPT

## 2022-01-23 PROCEDURE — 6360000002 HC RX W HCPCS: Performed by: NURSE PRACTITIONER

## 2022-01-23 PROCEDURE — 86140 C-REACTIVE PROTEIN: CPT

## 2022-01-23 PROCEDURE — 97166 OT EVAL MOD COMPLEX 45 MIN: CPT

## 2022-01-23 PROCEDURE — P9045 ALBUMIN (HUMAN), 5%, 250 ML: HCPCS | Performed by: NURSE PRACTITIONER

## 2022-01-23 PROCEDURE — 94640 AIRWAY INHALATION TREATMENT: CPT

## 2022-01-23 PROCEDURE — 97530 THERAPEUTIC ACTIVITIES: CPT

## 2022-01-23 PROCEDURE — 97535 SELF CARE MNGMENT TRAINING: CPT

## 2022-01-23 PROCEDURE — 97162 PT EVAL MOD COMPLEX 30 MIN: CPT

## 2022-01-23 PROCEDURE — 80069 RENAL FUNCTION PANEL: CPT

## 2022-01-23 RX ORDER — VITAMIN B COMPLEX
1000 TABLET ORAL NIGHTLY
Status: DISCONTINUED | OUTPATIENT
Start: 2022-01-23 | End: 2022-01-29 | Stop reason: HOSPADM

## 2022-01-23 RX ORDER — ALBUMIN, HUMAN INJ 5% 5 %
25 SOLUTION INTRAVENOUS ONCE
Status: COMPLETED | OUTPATIENT
Start: 2022-01-23 | End: 2022-01-23

## 2022-01-23 RX ADMIN — METHYLPREDNISOLONE SODIUM SUCCINATE 40 MG: 40 INJECTION, POWDER, LYOPHILIZED, FOR SOLUTION INTRAMUSCULAR; INTRAVENOUS at 18:07

## 2022-01-23 RX ADMIN — OXYCODONE HYDROCHLORIDE 15 MG: 5 TABLET ORAL at 18:07

## 2022-01-23 RX ADMIN — FAMOTIDINE 20 MG: 20 TABLET ORAL at 21:26

## 2022-01-23 RX ADMIN — BARICITINIB 4 MG: 2 TABLET, FILM COATED ORAL at 10:21

## 2022-01-23 RX ADMIN — Medication 2 PUFF: at 20:00

## 2022-01-23 RX ADMIN — GUAIFENESIN 600 MG: 600 TABLET, EXTENDED RELEASE ORAL at 09:57

## 2022-01-23 RX ADMIN — OXYCODONE HYDROCHLORIDE AND ACETAMINOPHEN 500 MG: 500 TABLET ORAL at 09:57

## 2022-01-23 RX ADMIN — GUAIFENESIN 600 MG: 600 TABLET, EXTENDED RELEASE ORAL at 21:26

## 2022-01-23 RX ADMIN — ENOXAPARIN SODIUM 90 MG: 100 INJECTION SUBCUTANEOUS at 21:26

## 2022-01-23 RX ADMIN — FAMOTIDINE 20 MG: 20 TABLET ORAL at 09:57

## 2022-01-23 RX ADMIN — OXYCODONE HYDROCHLORIDE 15 MG: 5 TABLET ORAL at 03:49

## 2022-01-23 RX ADMIN — ENOXAPARIN SODIUM 90 MG: 100 INJECTION SUBCUTANEOUS at 09:58

## 2022-01-23 RX ADMIN — TRAZODONE HYDROCHLORIDE 25 MG: 50 TABLET ORAL at 21:26

## 2022-01-23 RX ADMIN — SODIUM CHLORIDE, PRESERVATIVE FREE 10 ML: 5 INJECTION INTRAVENOUS at 09:58

## 2022-01-23 RX ADMIN — Medication 3 MG: at 21:26

## 2022-01-23 RX ADMIN — Medication 2 PUFF: at 08:58

## 2022-01-23 RX ADMIN — METHYLPREDNISOLONE SODIUM SUCCINATE 40 MG: 40 INJECTION, POWDER, LYOPHILIZED, FOR SOLUTION INTRAMUSCULAR; INTRAVENOUS at 06:40

## 2022-01-23 RX ADMIN — OXYCODONE HYDROCHLORIDE 15 MG: 5 TABLET ORAL at 07:51

## 2022-01-23 RX ADMIN — OXYCODONE HYDROCHLORIDE 15 MG: 5 TABLET ORAL at 23:13

## 2022-01-23 RX ADMIN — SODIUM CHLORIDE, PRESERVATIVE FREE 10 ML: 5 INJECTION INTRAVENOUS at 21:27

## 2022-01-23 RX ADMIN — Medication 1000 UNITS: at 21:26

## 2022-01-23 RX ADMIN — OXYCODONE HYDROCHLORIDE 15 MG: 5 TABLET ORAL at 13:49

## 2022-01-23 RX ADMIN — ALBUMIN (HUMAN) 25 G: 12.5 INJECTION, SOLUTION INTRAVENOUS at 01:37

## 2022-01-23 RX ADMIN — ASPIRIN 81 MG: 81 TABLET, COATED ORAL at 09:57

## 2022-01-23 RX ADMIN — Medication 10 ML: at 06:41

## 2022-01-23 RX ADMIN — Medication 200 MG: at 09:58

## 2022-01-23 ASSESSMENT — PAIN DESCRIPTION - LOCATION
LOCATION: BACK;NECK
LOCATION: BACK;NECK

## 2022-01-23 ASSESSMENT — PAIN DESCRIPTION - PAIN TYPE
TYPE: CHRONIC PAIN
TYPE: CHRONIC PAIN

## 2022-01-23 ASSESSMENT — PAIN SCALES - GENERAL
PAINLEVEL_OUTOF10: 7
PAINLEVEL_OUTOF10: 5

## 2022-01-23 NOTE — PROGRESS NOTES
Occupational Therapy   Occupational Therapy Initial Assessment/Treatment  Date: 2022   Patient Name: David Rachel  MRN: [id number]     : 1955    Date of Service: 2022    Discharge Recommendations:  Continue to assess pending progress  OT Equipment Recommendations  Other: CTA- possible shower chair. Assessment   Performance deficits / Impairments: Decreased ADL status; Decreased safe awareness;Decreased endurance;Decreased functional mobility     Assessment: Pt is a 67yo male with deficits in the areas listed above with Covid 19 and ARF. Pt is typically (I) with no AD at baseline. Today, pt performing UB/LB ADLs in seated with supervision to min A and vc's for initiating rest breaks and PLB 2/2 decreased O2 and increased SOB. Pt educated on energy conservation and safety with decreased O2 stats but will need reinforcement. Pt performing bed mobility and functional t/fs with no AD and SBA. Pt would continue to benefit from skilled OT services to increase endurance, safety and independence in ADLS and functional mobility. CTA D/c recommendation pending progress. Prognosis: Fair;Good  Decision Making: Medium Complexity  OT Education: OT Role;Energy Conservation;Plan of Care;Precautions; ADL Adaptive Strategies  Patient Education: disease specific: ther ex, general safety during hospitalization, safety d/t decreased O2 stats, purpose of ther ex, importance of OOB mobility, prevention of complications of bedrest. Pt verbalized understanding but may need reinforcement d/t wanting to perform activities even with low O2 stats. REQUIRES OT FOLLOW UP: Yes  Activity Tolerance  Activity Tolerance: Treatment limited secondary to medical complications (free text); Patient Tolerated treatment well  Activity Tolerance: Increased time d/t decreased O2 stats with activity, O2 decreasing to 85% on 15L HFNC but recovering with rest in seated and PLB.   Safety Devices  Safety Devices in place: Yes  Type of devices: Independent  Homemaking Assistance: Independent  Homemaking Responsibilities: Yes  Meal Prep Responsibility: Secondary  Laundry Responsibility: Primary  Cleaning Responsibility: Secondary  Bill Paying/Finance Responsibility: Primary  Shopping Responsibility: Secondary  Ambulation Assistance: Independent  Transfer Assistance: Independent  Active : Yes  Occupation: Retired  Type of occupation: construction  Additional Comments: Pt reports no falls in the past 6months       Objective   Vision: Impaired  Vision Exceptions: Wears glasses for reading  Hearing: Within functional limits    Orientation  Overall Orientation Status: Within Functional Limits  Observation/Palpation  Posture: Fair  Balance  Sitting Balance: Supervision  Standing Balance: Stand by assistance  Standing Balance  Time: ~25s  Activity: t/f from EOB to chair. Comment: no AD, pt destating with brief stand pivot t/f. ADL  UE Bathing: Supervision;Setup (seated in chair for spounge bathe. Pt not wanting to wash back this date stating that RN assisted with back yesterday. Pt supervision with vc's for safety for Energy conservation for remaining areas with wipes, seated in chair.)  UE Dressing: Minimal assistance; Increased time to complete;Verbal cueing (to doff/zaid gown. Vc's for safety and energy conservation)  LE Dressing: Setup (to doff and don socks in semifowlers.)  Tone RUE  RUE Tone: Normotonic  Tone LUE  LUE Tone: Normotonic  Coordination  Movements Are Fluid And Coordinated: No  Coordination and Movement description: Tremors (mild tremors.)     Bed mobility  Supine to Sit: Stand by assistance  Sit to Supine: Unable to assess  Scooting: Unable to assess  Comment: Pt ended session seated in chair. Transfers  Stand Pivot Transfers: Stand by assistance  Sit to stand: Stand by assistance  Stand to sit: Stand by assistance  Transfer Comments: no AD but pt becoming SOB and O2 decreasing with brief mobility.      Cognition  Overall Cognitive Status: WFL        Sensation  Overall Sensation Status: WFL  Type of ROM/Therapeutic Exercise  Type of ROM/Therapeutic Exercise: AROM  Comment: BUE ther ex seated in chair. Pt destating with larger ther ex requiring increased time to increase O2 stats. Exercises  Shoulder Flexion: x10  Shoulder Extension: x10  Elbow Flexion: x10  Elbow Extension: x10  Wrist Flexion: x10  Wrist Extension: x10  Finger Flexion: x10  Finger Extension: x10  Other: chest press x10     LUE AROM (degrees)  LUE AROM : WFL  Left Hand AROM (degrees)  Left Hand AROM: WFL  RUE AROM (degrees)  RUE AROM : WFL  Right Hand AROM (degrees)  Right Hand AROM: WFL  LUE Strength  Gross LUE Strength: WFL  L Hand General: 5/5  RUE Strength  Gross RUE Strength: WFL  R Hand General: 5/5                   Plan   Plan  Times per week: 2-3x/week  Current Treatment Recommendations: Endurance Training,Functional Mobility Training,Self-Care / ADL,Home Management Training,Equipment Evaluation, Education, & procurement,Patient/Caregiver Education & Training,Safety Education & Training    AM-PAC Score        -[facility] Inpatient Daily Activity Raw Score: 19 (01/23/22 1630)  AM-PAC Inpatient ADL T-Scale Score : 40.22 (01/23/22 1630)  ADL Inpatient CMS 0-100% Score: 42.8 (01/23/22 1630)  ADL Inpatient CMS G-Code Modifier : CK (01/23/22 1630)    Goals  Short term goals  Time Frame for Short term goals: 1 week (1/30) unless stated otherwise. Short term goal 1: Pt will LB dress with supervision, taking rest breaks PRN, and AD PRN. Short term goal 2: Pt will perform at least 2min of standing functional activities. Short term goal 3: Pt will perform BUE ther ex x20 to increase endurance for functional activities (1/28)  Short term goal 4: Pt will toilet with supervision and AD PRN.   Patient Goals   Patient goals : \"just leave here\"       Therapy Time   Individual Concurrent Group Co-treatment   Time In 1530         Time Out 1607         Minutes 37         Timed Code Treatment Minutes: 27 Minutes (10min eval)       Jasiel Uribe, OTR/L  If pt discharges prior to next session, this note will serve as discharge summary. See case management note for discharge disposition.

## 2022-01-23 NOTE — PROGRESS NOTES
Hospitalist Progress Note      PCP: No primary care provider on file. Date of Admission: 1/18/2022      Chief Complaint: sob     Hospital Course:  reviewed      Subjective: bp low overnight, given iv albumin , notes ongoing SOB, on 15L hfnc      Medications:  Reviewed    Infusion Medications    sodium chloride 25 mL (01/19/22 1653)     Scheduled Medications    traZODone  25 mg Oral Nightly    zinc sulfate  50 mg Oral QPM    ascorbic acid  500 mg Oral Daily    guaiFENesin  600 mg Oral BID    enoxaparin  1 mg/kg SubCUTAneous BID    baricitinib  4 mg Oral Daily    methylPREDNISolone  40 mg IntraVENous Q12H    aspirin EC  81 mg Oral Daily    budesonide-formoterol  2 puff Inhalation BID    sodium chloride flush  5-40 mL IntraVENous 2 times per day    nicotine  1 patch TransDERmal Daily    famotidine  20 mg Oral BID    melatonin  3 mg Oral Nightly    thiamine  200 mg Oral Daily     PRN Meds: oxyCODONE, sodium chloride flush, sodium chloride, polyethylene glycol, acetaminophen **OR** acetaminophen, prochlorperazine, albuterol sulfate HFA, benzonatate      Intake/Output Summary (Last 24 hours) at 1/23/2022 0904  Last data filed at 1/23/2022 0640  Gross per 24 hour   Intake 930 ml   Output 425 ml   Net 505 ml       Physical Exam Performed:    BP (!) 95/51   Pulse 66   Temp 97.5 °F (36.4 °C) (Axillary)   Resp 18   Ht 5' 10\" (1.778 m)   Wt 191 lb 5.8 oz (86.8 kg)   SpO2 94%   BMI 27.46 kg/m²       General appearance: No apparent distress, appears stated age and cooperative. HEENT: Pupils equal, round, and reactive to light. Conjunctivae/corneas clear. Neck: Supple, with full range of motion. No jugular venous distention. Trachea midline. Respiratory:  Normal respiratory effort. Clear to auscultation, bilaterally without Wheezes/Rhonchi. bibas rales noted  Cardiovascular: Regular rate and rhythm with normal S1/S2 without murmurs, rubs or gallops.   Abdomen: Soft, non-tender, non-distended with normal bowel sounds. Musculoskeletal: No clubbing, cyanosis or edema bilaterally.  Full range of motion without deformity. Skin: Skin color, texture, turgor normal.  No rashes or lesions. Neurologic:  Neurovascularly intact without any focal sensory/motor deficits. Cranial nerves: II-XII intact, grossly non-focal.  Psychiatric: Alert and oriented, thought content appropriate, normal insight  Capillary Refill: Brisk,3 seconds, normal   Peripheral Pulses: +2 palpable, equal bilaterally        Labs:   Recent Labs     01/21/22  0720 01/22/22  0656 01/23/22  0654   WBC 8.1 8.9 7.0   HGB 13.7 13.2* 12.3*   HCT 40.9 40.4* 38.5*    268 253     Recent Labs     01/21/22  0719 01/22/22  0656 01/23/22  0654   * 135* 139   K 4.9 5.3* 4.6    102 102   CO2 17* 25 22   BUN 23* 28* 27*   CREATININE 0.6* <0.5* 0.6*   CALCIUM 8.4 8.7 7.8*   PHOS 4.1 3.8 3.8     No results for input(s): AST, ALT, BILIDIR, BILITOT, ALKPHOS in the last 72 hours. No results for input(s): INR in the last 72 hours. No results for input(s): Earnie Lent in the last 72 hours. Urinalysis:    No results found for: Jonda Deck, BACTERIA, RBCUA, BLOODU, [address], [address]    Radiology:  XR CHEST PORTABLE   Final Result   Bilateral pulmonary opacification concerning for multifocal pneumonia   including COVID-19 pneumonia. Assessment/Plan:    Active Hospital Problems    Diagnosis     Elevated d-dimer [R79.89]     Elevated ferritin level [R79.89]     Pulmonary infiltrates [R91.8]     Current smoker [F17.200]     Marijuana use [F12.90]     Elevated C-reactive protein (CRP) [R79.82]     Overweight (BMI 25.0-29. 9) [E66.3]     Chronic prescription opiate use [Z79.891]     Hyperglycemia [R73.9]     Acute hypoxemic respiratory failure (HCC) [J96.01]     COPD (chronic obstructive pulmonary disease) (HCC) [J44.9]     Tachycardia [R00.0]     Tachypnea [R06.82]     Elevated lactic acid level [R79.89] Acute hypoxic respiratory failure, POA- likely due to [address] evidenced by respiratory distress, use of accessory muscles and O2 saturation of less than 90% on room air on presentation   - supplemental oxygen as necessary to keep SaO2 > 90%, not on O2 at home  - Dexamethasone was started. Switched to iv solumedrol 1/19  -baricitnib started 1/19   -pulm consulted given severe dz     Pneumonia, organism unspecified - Pt has been started on Rocephin and Azithromycin  - COVID-19 pneumonia suspected; Rapid COVID was neg but PCR was positive  - Pt placed in \"droplet plus\" isolation, and PPE instructions have been provided  - Rapid Influenza A&B negative  - Blood cultures x 2 ordered  - Respiratory culture ordered  - Legionella pneumophilia and Strep pneumoniae urine antigens ordered and neg  - Procalcitonin 0.09 (not elevated)  - Incentive spirometry ordered  - Low intensity Enoxaparin has been started  - Will provide high calorie, high protein dietary supplements  - Dexamethasone was started. -switched to iv solumedrol  - 25 hydroxy Vitamin D lab ordered, ordered vit d while inpt  - Zinc sulfate 50 mg bid ordered  - Thiamine 200 mg po daily ordered  - Pepcid 20 mg po bid     Cough- provided antitussive medications as needed     Non-Intractable vomiting with nausea - given symptomatic treatment with Zofran and/or Phenergan as needed, maintenance of fluids and electrolytes     Poor appetite - improved, Patient encouraged to eat a balanced diet, including protein-rich foods.   -provided high calorie, high protein dietary supplements     Generalized weakness/fatigue - likely from covid  -pt/ot ordered 1/23     Sepsis ([facility] [address] due to Pneumonia with tachycardia, tachypnea. Initial Lactic Acid was elevated.    - Pt will not be resuscitated with the full 30 cc/Kg IV Fluids per sepsis protocol as his BP is adequate and he is a high risk of fluid overload and catastrophic decompensation if he were to receive such a bolus. His blood pressure will be monitored closely  -monitored lactate, normalized  - Blood cultures x 2 have been ordered      COPD (chronic obstructive pulmonary disease) (HCC) -without acute exacerbation.  We will monitor and provide breathing treatments as indicated     DVT Prophylaxis: Low intensity Enoxaparin  Diet: ADULT ORAL NUTRITION SUPPLEMENT; Lunch, Dinner; Standard High Calorie/High Protein Oral Supplement  ADULT DIET;  Regular  Code Status: Full Code         PT/OT Eval Status: ordered 1/23     Dispo - pending workup, unclear, pulm recs, improved oxygenation, ?likely here for another week ,     Rad Santana MD

## 2022-01-23 NOTE — PROGRESS NOTES
Bp 85/57 pulse and O2 saturation normal.  Patient not dizzy or more SOB. K Knabb notified. See orders.

## 2022-01-23 NOTE — PLAN OF CARE
Problem: Falls - Risk of:  Goal: Will remain free from falls  Description: Will remain free from falls  Outcome: Ongoing     Problem: Pain:  Goal: Pain level will decrease  Description: Pain level will decrease  Outcome: Ongoing     Problem: Nutrition Deficits  Goal: Optimize nutritional status  Outcome: Ongoing

## 2022-01-23 NOTE — PROGRESS NOTES
Physical Therapy    Facility/Department: [place] [address] - MED SURG/ORTHO  Initial Assessment and Treatment    NAME: Ba Chaudhari  : 1955  MRN: [id number]    Date of Service: 2022    Discharge Recommendations:  Continue to assess pending progress   PT Equipment Recommendations  Equipment Needed: No  If pt is unable to be seen after this session, please let this note serve as discharge summary. Please see case management note for discharge disposition. Thank you. Assessment   Body structures, Functions, Activity limitations: Decreased functional mobility ; Decreased ADL status; Decreased strength;Decreased endurance;Decreased balance  Assessment: Patient is a 78year old male who was admitted to [facility] on 22 with COVID-19. Patient is normally completely independent. Today he needed SBA for bed mobility, transfers and SBA to ambulate up to 3 feet. Additional ambulation was limited due to patient's oxygen desaturation on exertion. Patient is below his baseline level of function and would benefit from skilled PT plan of care. Physical therapy will continue to assess patient and will provide formal post-acute discharge recommendations when the patient is more medically stable and closer to discharge. Anticipate that patients mobility will significantly improve when his respiratory status also improves. PT to continue to follow. Treatment Diagnosis: decreased independence with functional mobility. Specific instructions for Next Treatment: progress mobility as tolerated  Prognosis: Good  Decision Making: Medium Complexity  PT Education: Goals; General Safety;Gait Training;Disease Specific Education;PT Role;Plan of Care; Functional Mobility Training;Pressure Relief; Injury Prevention;Home Exercise Program;Precautions; Energy Conservation;Transfer Training  Patient Education: Disease Specific Education: Patient educated on importance of OOB mobility, pursed lip breathing, energy conservation, monitoring of symptoms/monitoring O2 saturation, how to wear a mask to decrease spread of COVID-19, prevention of complications of bedrest, and general safety during hospitalization. Patient verbalized understanding  Barriers to Learning: none  REQUIRES PT FOLLOW UP: Yes  Activity Tolerance  Activity Tolerance: Patient limited by fatigue;Patient limited by endurance;Treatment limited secondary to medical complications (free text)  Activity Tolerance: Vitals: 123/77 71 BPM 96% on 15L. On exertion his O2 saturation dropped to 85% on 15L. With seated rest break with pursed lip breathing his O2 returned to 92% on 15L. RN Ana Garcia made aware. Patient Diagnosis(es): The encounter diagnosis was Acute hypoxemic respiratory failure due to COVID-19 [facility]). has a past medical history of COPD (chronic obstructive pulmonary disease) ([facility] [address] has no past surgical history on file. Restrictions  Restrictions/Precautions  Restrictions/Precautions: Contact Precautions,Fall Risk,Isolation,Up as Tolerated  Position Activity Restriction  Other position/activity restrictions: Droplet plus isolation. Tested positive for COVID-19 on 1/18/22. 15L of O2 via HFNC. non rebreather PRN. Vision/Hearing  Vision: Impaired  Vision Exceptions: Wears glasses for reading  Hearing: Within functional limits     Subjective  General  Chart Reviewed: Yes  Patient assessed for rehabilitation services?: Yes  Additional Pertinent Hx: COVID-19  Response To Previous Treatment: Not applicable  Family / Caregiver Present: No  Referring Practitioner: Samanta Max MD  Referral Date : 01/23/22  General Comment  Comments: Supine in bed upon entry of therapy staff. Cleared for therapy by RN. Subjective  Subjective: Patient agreed to participate.   Pain Screening  Patient Currently in Pain: Denies  Vital Signs  Patient Currently in Pain: Denies       Orientation  Orientation  Overall Orientation Status: Within Functional Limits  Social/Functional History  Social/Functional History  Lives With: Son,Other (comment) (Son works. Daughter in law or her son would typically be at home and could assist.)  Type of Home: Mobile home  Home Layout: One level  Home Access: Stairs to enter with rails  Entrance Stairs - Number of Steps: 4  Entrance Stairs - Rails: Left  Bathroom Shower/Tub: Tub/Shower unit  Bathroom Toilet: Standard  Home Equipment:  (no DME)  ADL Assistance: Independent  Homemaking Assistance: Independent  Homemaking Responsibilities: Yes  Meal Prep Responsibility: Secondary  Laundry Responsibility: Primary  Cleaning Responsibility: Secondary  Bill Paying/Finance Responsibility: Primary  Shopping Responsibility: Secondary  Ambulation Assistance: Independent  Transfer Assistance: Independent  Active : Yes  Occupation: Retired  Type of occupation: construction  Additional Comments: Pt reports no falls in the past 6months  Cognition   Cognition  Overall Cognitive Status: WFL    Objective     Observation/Palpation  Posture: Fair    PROM RLE (degrees)  RLE PROM: WFL  AROM RLE (degrees)  RLE AROM: WFL  PROM LLE (degrees)  LLE PROM: WFL  AROM LLE (degrees)  LLE AROM : WFL  Strength RLE  Strength RLE: WFL  Strength LLE  Strength LLE: WFL  Motor Control  Gross Motor?: WFL  Sensation  Overall Sensation Status: WFL  Bed mobility  Supine to Sit: Stand by assistance  Sit to Supine: Unable to assess  Scooting: Unable to assess  Comment: patient ended session in chair  Transfers  Sit to Stand: Stand by assistance  Stand to sit: Stand by assistance  Bed to Chair: Stand by assistance  Comment: cueing for pursed lip breathing, safe hand placement  Ambulation  Ambulation?: Yes  More Ambulation?: No  Ambulation 1  Surface: level tile  Device: No Device  Assistance: Stand by assistance  Quality of Gait: forward flexed posture, cueing to correct. Gait Deviations: Slow Mayra;Decreased step length;Decreased step height  Distance: bed to chair ~3 feet.  distance limited by oxygen desaturation on exertion. Comments: No complaints of chest pain, dizziness. he did have some shortness of breath on exertion. Stairs/Curb  Stairs?: No     Balance  Posture: Fair  Sitting - Static: Good  Sitting - Dynamic: Good  Standing - Static: Fair;+  Standing - Dynamic: Fair;+  Exercises  Hip Flexion: 1 x 10 bilateral  Knee Long Arc Quad: 1 x 10 bilateral  Ankle Pumps: 1 x 10 bilateral  Comments: cueing for sequencing and technique. Other exercises  Other exercises?: No     Plan   Plan  Times per week: 2-3/week  Plan weeks: 1 week 1/30/22  Specific instructions for Next Treatment: progress mobility as tolerated  Current Treatment Recommendations: Strengthening,Safety Education & Training,Home Exercise Program,Balance [employer] Training,Patient/Caregiver Education & Training,Functional Mobility Training,Equipment Evaluation, Education, & procurement,Transfer Training,Gait Training,Positioning,ADL/Self-care Training  Safety Devices  Type of devices: All fall risk precautions in place,Left in chair,Call light within reach,Nurse notified,Gait belt,Patient at risk for falls (RN stated that patient does not need a chair alarm because he is oriented and able to call for assistance if needed)    AM-PAC Score     AM-PAC Inpatient Mobility without Stair Climbing Raw Score : 15 (01/23/22 1626)  AM-PAC Inpatient without Stair Climbing T-Scale Score : 43.03 (01/23/22 1626)  Mobility Inpatient CMS 0-100% Score: 47.43 (01/23/22 1626)  Mobility Inpatient without Stair CMS G-Code Modifier : CK (01/23/22 1626)       Goals  Short term goals  Time Frame for Short term goals: 1 week 1/30/22  Short term goal 1: Supine <> sit with modified independence.   Short term goal 2: Sit <> stand with modified independence  Short term goal 3: Bed <> chair with least restrictive assistive device and modified independence  Short term goal 4: Ambulate 100 feet with least restrictive assistive device and modified independence. Short term goal 5: By 1/26/22 Patient will tolerate 12-15 reps of his exercises to maximize his strength/endurance  Patient Goals   Patient goals : To improve his endurance. To go home.        Therapy Time   Individual Concurrent Group Co-treatment   Time In 5608         Time Out 1607         Minutes 36         Timed Code Treatment Minutes: 26 Minutes (10 minute evaluation)       Niharika Acosta, PT

## 2022-01-24 LAB
ALBUMIN SERPL-MCNC: 3.3 G/DL (ref 3.4–5)
ANION GAP SERPL CALCULATED.3IONS-SCNC: 9 MMOL/L (ref 3–16)
BASOPHILS ABSOLUTE: 0 K/UL (ref 0–0.2)
BASOPHILS RELATIVE PERCENT: 0.2 %
BUN BLDV-MCNC: 23 MG/DL (ref 7–20)
C-REACTIVE PROTEIN: <3 MG/L (ref 0–5.1)
CALCIUM SERPL-MCNC: 9 MG/DL (ref 8.3–10.6)
CHLORIDE BLD-SCNC: 102 MMOL/L (ref 99–110)
CO2: 25 MMOL/L (ref 21–32)
CREAT SERPL-MCNC: <0.5 MG/DL (ref 0.8–1.3)
EOSINOPHILS ABSOLUTE: 0 K/UL (ref 0–0.6)
EOSINOPHILS RELATIVE PERCENT: 0 %
GFR AFRICAN AMERICAN: >60
GFR NON-AFRICAN AMERICAN: >60
GLUCOSE BLD-MCNC: 143 MG/DL (ref 70–99)
HCT VFR BLD CALC: 39.4 % (ref 40.5–52.5)
HEMOGLOBIN: 12.8 G/DL (ref 13.5–17.5)
LYMPHOCYTES ABSOLUTE: 1 K/UL (ref 1–5.1)
LYMPHOCYTES RELATIVE PERCENT: 12.3 %
MCH RBC QN AUTO: 29.5 PG (ref 26–34)
MCHC RBC AUTO-ENTMCNC: 32.6 G/DL (ref 31–36)
MCV RBC AUTO: 90.2 FL (ref 80–100)
MONOCYTES ABSOLUTE: 0.7 K/UL (ref 0–1.3)
MONOCYTES RELATIVE PERCENT: 8.6 %
NEUTROPHILS ABSOLUTE: 6.4 K/UL (ref 1.7–7.7)
NEUTROPHILS RELATIVE PERCENT: 78.9 %
PDW BLD-RTO: 13.5 % (ref 12.4–15.4)
PHOSPHORUS: 3 MG/DL (ref 2.5–4.9)
PLATELET # BLD: 289 K/UL (ref 135–450)
PMV BLD AUTO: 6.9 FL (ref 5–10.5)
POTASSIUM SERPL-SCNC: 4 MMOL/L (ref 3.5–5.1)
RBC # BLD: 4.36 M/UL (ref 4.2–5.9)
SODIUM BLD-SCNC: 136 MMOL/L (ref 136–145)
WBC # BLD: 8.1 K/UL (ref 4–11)

## 2022-01-24 PROCEDURE — 6360000002 HC RX W HCPCS: Performed by: INTERNAL MEDICINE

## 2022-01-24 PROCEDURE — 6370000000 HC RX 637 (ALT 250 FOR IP): Performed by: INTERNAL MEDICINE

## 2022-01-24 PROCEDURE — 36415 COLL VENOUS BLD VENIPUNCTURE: CPT

## 2022-01-24 PROCEDURE — 86140 C-REACTIVE PROTEIN: CPT

## 2022-01-24 PROCEDURE — 94761 N-INVAS EAR/PLS OXIMETRY MLT: CPT

## 2022-01-24 PROCEDURE — 99232 SBSQ HOSP IP/OBS MODERATE 35: CPT | Performed by: INTERNAL MEDICINE

## 2022-01-24 PROCEDURE — 2580000003 HC RX 258: Performed by: INTERNAL MEDICINE

## 2022-01-24 PROCEDURE — 2700000000 HC OXYGEN THERAPY PER DAY

## 2022-01-24 PROCEDURE — 80069 RENAL FUNCTION PANEL: CPT

## 2022-01-24 PROCEDURE — 1200000000 HC SEMI PRIVATE

## 2022-01-24 PROCEDURE — 94640 AIRWAY INHALATION TREATMENT: CPT

## 2022-01-24 PROCEDURE — 85025 COMPLETE CBC W/AUTO DIFF WBC: CPT

## 2022-01-24 RX ADMIN — METHYLPREDNISOLONE SODIUM SUCCINATE 40 MG: 40 INJECTION, POWDER, LYOPHILIZED, FOR SOLUTION INTRAMUSCULAR; INTRAVENOUS at 06:32

## 2022-01-24 RX ADMIN — OXYCODONE HYDROCHLORIDE 15 MG: 5 TABLET ORAL at 22:43

## 2022-01-24 RX ADMIN — OXYCODONE HYDROCHLORIDE 15 MG: 5 TABLET ORAL at 09:28

## 2022-01-24 RX ADMIN — ENOXAPARIN SODIUM 90 MG: 100 INJECTION SUBCUTANEOUS at 21:44

## 2022-01-24 RX ADMIN — ENOXAPARIN SODIUM 90 MG: 100 INJECTION SUBCUTANEOUS at 09:28

## 2022-01-24 RX ADMIN — Medication 2 PUFF: at 20:48

## 2022-01-24 RX ADMIN — Medication 2 PUFF: at 09:44

## 2022-01-24 RX ADMIN — Medication 3 MG: at 21:44

## 2022-01-24 RX ADMIN — SODIUM CHLORIDE, PRESERVATIVE FREE 10 ML: 5 INJECTION INTRAVENOUS at 21:52

## 2022-01-24 RX ADMIN — GUAIFENESIN 600 MG: 600 TABLET, EXTENDED RELEASE ORAL at 09:27

## 2022-01-24 RX ADMIN — OXYCODONE HYDROCHLORIDE 15 MG: 5 TABLET ORAL at 05:18

## 2022-01-24 RX ADMIN — Medication 10 ML: at 06:33

## 2022-01-24 RX ADMIN — ASPIRIN 81 MG: 81 TABLET, COATED ORAL at 09:27

## 2022-01-24 RX ADMIN — GUAIFENESIN 600 MG: 600 TABLET, EXTENDED RELEASE ORAL at 21:44

## 2022-01-24 RX ADMIN — ZINC SULFATE 220 MG (50 MG) CAPSULE 50 MG: CAPSULE at 18:42

## 2022-01-24 RX ADMIN — Medication 1000 UNITS: at 21:44

## 2022-01-24 RX ADMIN — BARICITINIB 4 MG: 2 TABLET, FILM COATED ORAL at 09:27

## 2022-01-24 RX ADMIN — OXYCODONE HYDROCHLORIDE 15 MG: 5 TABLET ORAL at 13:24

## 2022-01-24 RX ADMIN — FAMOTIDINE 20 MG: 20 TABLET ORAL at 21:44

## 2022-01-24 RX ADMIN — OXYCODONE HYDROCHLORIDE AND ACETAMINOPHEN 500 MG: 500 TABLET ORAL at 09:27

## 2022-01-24 RX ADMIN — FAMOTIDINE 20 MG: 20 TABLET ORAL at 09:27

## 2022-01-24 RX ADMIN — TRAZODONE HYDROCHLORIDE 25 MG: 50 TABLET ORAL at 21:44

## 2022-01-24 RX ADMIN — Medication 200 MG: at 09:27

## 2022-01-24 RX ADMIN — OXYCODONE HYDROCHLORIDE 15 MG: 5 TABLET ORAL at 18:04

## 2022-01-24 RX ADMIN — METHYLPREDNISOLONE SODIUM SUCCINATE 40 MG: 40 INJECTION, POWDER, LYOPHILIZED, FOR SOLUTION INTRAMUSCULAR; INTRAVENOUS at 18:04

## 2022-01-24 ASSESSMENT — PAIN DESCRIPTION - LOCATION
LOCATION: NECK;SHOULDER
LOCATION: GENERALIZED

## 2022-01-24 ASSESSMENT — PAIN SCALES - GENERAL
PAINLEVEL_OUTOF10: 7
PAINLEVEL_OUTOF10: 7
PAINLEVEL_OUTOF10: 8
PAINLEVEL_OUTOF10: 8
PAINLEVEL_OUTOF10: 5
PAINLEVEL_OUTOF10: 8

## 2022-01-24 ASSESSMENT — PAIN DESCRIPTION - ORIENTATION: ORIENTATION: RIGHT;LEFT

## 2022-01-24 ASSESSMENT — PAIN DESCRIPTION - DESCRIPTORS: DESCRIPTORS: ACHING

## 2022-01-24 ASSESSMENT — PAIN DESCRIPTION - PAIN TYPE: TYPE: CHRONIC PAIN

## 2022-01-24 NOTE — PLAN OF CARE
Protect patient from fall injury by keeping bed in low position, use of non skid socks and bed alarm system. Keep belongings and call light with reach at all times and following fall protocol. Encourage patient to ask for pain medication before pain is above a 5 on the 1/10 pain scale. Medicate before PT or increased activity. Use alternatives such as ice (if ordered) or distraction as often as possible to minimize  need for medication. Monitor breath sounds every shift and prn. Monitor pulse ox continuously and titrate O2 as needed. Monitor I and O and comment on amount eaten. Monitor weight. Assess skin for red areas. Encourage turns every 2 hours. Keep skin dry and clean. Keep heels off bed. Apply lotion to legs and feet daily to prevent cracks in skin.

## 2022-01-24 NOTE — PROGRESS NOTES
Hospitalist Progress Note      PCP: No primary care provider on file. Date of Admission: 1/18/2022    Chief Complaint:   Shortness of breath    Hospital Course:   Pt is an 78y.o. year-old male with a history of COPD who presents to the emergency room for evaluation following a 2-week history of shortness of breath, and intermittent nonproductive cough and intermittent episodes of emesis. The emesis is nonbilious nonbloody. In the emergency room he was found to have an oxygen saturation of 83% on room air. He had a rapid COVID test performed which was negative. However, imaging has findings suggestive of a COVID-19 pneumonia. He is being admitted for further evaluation and treatment. Associated signs and symptoms do not include fever or chills, abdominal pain, hemoptysis, hematochezia, diarrhea, constipation or urinary symptoms. Subjective:   Seen resting in bed watching TV. States his breathing is improving and he has been switching between the nasal cannula and nonrebreather mask. He denies chest pain, lightheadedness or dizziness, fever or chills, nausea/vomiting, abdominal pain, or any focal neurologic deficit.       Medications:  Reviewed    Infusion Medications    sodium chloride 25 mL (01/19/22 3299)     Scheduled Medications    Vitamin D  1,000 Units Oral Nightly    traZODone  25 mg Oral Nightly    zinc sulfate  50 mg Oral QPM    ascorbic acid  500 mg Oral Daily    guaiFENesin  600 mg Oral BID    enoxaparin  1 mg/kg SubCUTAneous BID    baricitinib  4 mg Oral Daily    methylPREDNISolone  40 mg IntraVENous Q12H    aspirin EC  81 mg Oral Daily    budesonide-formoterol  2 puff Inhalation BID    sodium chloride flush  5-40 mL IntraVENous 2 times per day    nicotine  1 patch TransDERmal Daily    famotidine  20 mg Oral BID    melatonin  3 mg Oral Nightly    thiamine  200 mg Oral Daily     PRN Meds: oxyCODONE, sodium chloride flush, sodium chloride, polyethylene glycol, acetaminophen **OR** acetaminophen, prochlorperazine, albuterol sulfate HFA, benzonatate      Intake/Output Summary (Last 24 hours) at 1/24/2022 0915  Last data filed at 1/24/2022 0543  Gross per 24 hour   Intake 480 ml   Output 1650 ml   Net -1170 ml       Physical Exam Performed:    /72   Pulse 65   Temp 97.6 °F (36.4 °C) (Oral)   Resp 18   Ht 5' 10\" (1.778 m)   Wt 191 lb 5.8 oz (86.8 kg)   SpO2 99%   BMI 27.46 kg/m²     General appearance: No apparent distress, appears stated age and cooperative. HEENT: Pupils equal, round, and reactive to light. Conjunctivae/corneas clear. Neck: Supple, with full range of motion. No jugular venous distention. Trachea midline. Respiratory: On 15 L O2 via NC minimal accessory muscle use. Rhonchi bilaterally. Cardiovascular: Regular rate and rhythm with normal S1/S2 without murmurs, rubs or gallops. Abdomen: Soft, non-tender, non-distended with normal bowel sounds. Musculoskeletal: No clubbing, cyanosis or edema bilaterally. Full range of motion without deformity. Skin: Skin color, texture, turgor normal.  No rashes or lesions. Neurologic:  Neurovascularly intact without any focal sensory/motor deficits. Cranial nerves: II-XII intact, grossly non-focal.  Psychiatric: Alert and oriented, thought content appropriate, normal insight  Capillary Refill: Brisk,3 seconds, normal   Peripheral Pulses: +2 palpable, equal bilaterally       Labs:   Recent Labs     01/22/22  0656 01/23/22  0654 01/24/22  0704   WBC 8.9 7.0 8.1   HGB 13.2* 12.3* 12.8*   HCT 40.4* 38.5* 39.4*    253 289     Recent Labs     01/22/22  0656 01/23/22  0654 01/24/22  0704   * 139 136   K 5.3* 4.6 4.0    102 102   CO2 25 22 25   BUN 28* 27* 23*   CREATININE <0.5* 0.6* <0.5*   CALCIUM 8.7 7.8* 9.0   PHOS 3.8 3.8 3.0     No results for input(s): AST, ALT, BILIDIR, BILITOT, ALKPHOS in the last 72 hours. No results for input(s): INR in the last 72 hours.   No results for input(s): Yaz Morton in the last 72 hours. Urinalysis:    No results found for: Jose De Jesus Heads, BACTERIA, RBCUA, BLOODU, [address], [address]    Radiology:  XR CHEST PORTABLE   Final Result   Bilateral pulmonary opacification concerning for multifocal pneumonia   including COVID-19 pneumonia. Assessment/Plan:    Active Hospital Problems    Diagnosis     Elevated d-dimer [R79.89]     Elevated ferritin level [R79.89]     Pulmonary infiltrates [R91.8]     Current smoker [F17.200]     Marijuana use [F12.90]     Elevated C-reactive protein (CRP) [R79.82]     Overweight (BMI 25.0-29. 9) [E66.3]     Chronic prescription opiate use [Z79.891]     Hyperglycemia [R73.9]     Acute hypoxemic respiratory failure (HCC) [J96.01]     COPD (chronic obstructive pulmonary disease) (HCC) [J44.9]     Tachycardia [R00.0]     Tachypnea [R06.82]     Elevated lactic acid level [R79.89]      Acute hypoxic respiratory failure, POA- likely due to COVID pneumonia  - as evidenced by respiratory distress, use of accessory muscles and O2 saturation of less than 90% on room air on presentation, PCR positive  -Droplet plus isolation  - supplemental oxygen as necessary to keep SaO2 > 90%, not on O2 at home  - Dexamethasone was started. Switched to iv solumedrol 1/19  -baricitnib started 1/19  -pulm consulted, recommending continued oxygen supplementation with titration parameters, if patient continues to require more oxygen to switch to Vapotherm.  - Rapid Influenza A&B negative  - Blood cultures x 2 negative  - Respiratory culture negative  - Legionella pneumophilia and Strep pneumoniae urine antigens ordered and neg  - Procalcitonin 0.09 (not elevated)  - Incentive spirometry ordered  - Low intensity Enoxaparin has been started  - Will provide high calorie, high protein dietary supplements  - 25 hydroxy Vitamin D lab ordered, ordered vit d while inpt  - Zinc sulfate 50 mg bid ordered  - Thiamine 200 mg po daily ordered  - Pepcid 20 mg po bid     Cough  - provided antitussive medications as needed     Non-Intractable vomiting with nausea    given symptomatic treatment with Zofran and/or Phenergan as needed, maintenance of fluids and electrolytes     Poor appetite   - improved, Patient encouraged to eat a balanced diet, including protein-rich foods.   -provided high calorie, high protein dietary supplements     Generalized weakness/fatigue   -likely from covid, improving   -pt/ot ordered 1/23     Sepsis ([facility] [address] due to Pneumonia with tachycardia, tachypnea. Initial Lactic Acid was elevated. - Pt will not be resuscitated with the full 30 cc/Kg IV Fluids per sepsis protocol as his BP is adequate and he is a high risk of fluid overload and catastrophic decompensation if he were to receive such a bolus. His blood pressure will be monitored closely  -Lactate WNL  - Blood cultures x 2 negative       COPD (chronic obstructive pulmonary disease) (HCC) -without acute exacerbation.    We will monitor and provide breathing treatments as indicated       DVT Prophylaxis: Lovenox  Diet: ADULT ORAL NUTRITION SUPPLEMENT; Lunch, Dinner; Standard High Calorie/High Protein Oral Supplement  ADULT DIET; Regular  Code Status: Full Code    PT/OT Eval Status:  Following     Dispo - Likely few more days pending respiratory status    Anastasia Harris

## 2022-01-24 NOTE — PROGRESS NOTES
INPATIENT PULMONARY CRITICAL CARE PROGRESS NOTE      Reason for visit    respiratory failure, COVID, increasing oxygen requirements    SUBJECTIVE: Patient when evaluated was having profound shortness of breath and patient continues to be having significant hypoxemia, patient was on 15 L of high flow oxygen along with 15 L of nonrebreather facemask to maintain oxygen saturation but patient intermittently keeps on pulling up the nonrebreather facemask as discussed with nursing,, patient was having slightly improved work of breathing,Patient was afebrile and hemodynamically maintained, patient's p.o. intake has improved side, patient's urine output is as documented was adequate with cumulative fluid balance of - 2.1 L , patient's glycemic control was acceptable, no other pertinent review of system of concern             Physical Exam:  Blood pressure 109/70, pulse 71, temperature 97.7 °F (36.5 °C), temperature source Oral, resp. rate 18, height 5' 10\" (1.778 m), weight 191 lb 5.8 oz (86.8 kg), SpO2 94 %.'         In-person bedside physical examination deferred. Pursuant to the emergency declaration under the [address] [place] [address], [address] [place] [address] authority and the [employer] General Act, this clinical encounter was conducted to provide necessary medical care. (Also consistent with new provisions and guidance offered by [facility] on March 18, 2020 in setting of COVID 19 outbreak and in order to preserve personal protective equipment in accordance with the flexibilities announced by CMS on March 30, 2020)   References: https://[facility]. [facility]/Portals/0/Resources/COVID-19/3_18%20Telemed%20Guidance%20Updated%20March%2018. pdf?[email or web address]                      https://[facility]. [place]/Portals/0/Resources/COVID-19/3_18%20Telemed%20Guidance%20Updated%20March%2018. pdf?lve=[email or web address] pdf             Results:  CBC:   Recent Labs     01/21/22  0720 01/22/22  0656 01/23/22  0654   WBC 8.1 8.9 7.0   HGB 13.7 13.2* 12.3*   HCT 40.9 40.4* 38.5*   MCV 89.7 91.4 91.2    268 253     BMP:   Recent Labs     01/21/22  0719 01/22/22  0656 01/23/22  0654   * 135* 139   K 4.9 5.3* 4.6    102 102   CO2 17* 25 22   PHOS 4.1 3.8 3.8   BUN 23* 28* 27*   CREATININE 0.6* <0.5* 0.6*       Imaging:  I have reviewed radiology images personally. XR CHEST PORTABLE   Final Result   Bilateral pulmonary opacification concerning for multifocal pneumonia   including COVID-19 pneumonia. Assessment:  Active Problems:    Acute hypoxemic respiratory failure (HCC)    COPD (chronic obstructive pulmonary disease) (HCC)    Tachycardia    Tachypnea    Elevated lactic acid level    Pulmonary infiltrates    Current smoker    Marijuana use    Elevated C-reactive protein (CRP)    Overweight (BMI 25.0-29. 9)    Chronic prescription opiate use    Hyperglycemia    Elevated d-dimer    Elevated ferritin level  Resolved Problems:    * No resolved hospital problems. *          Plan:   · Oxygen supplementation to keep saturation.   90-94% only  · Please titrate oxygen as per above parameters  · Patient's oxygen requirements are going up and patient was on 15 L of high flow oxygen along with nonrebreather facemask to maintain oxygen saturation-patient need to be compliant with use of the nonrebreather facemask otherwise patient will have worsening hypoxemia  · Please monitor for any worsening respiratory compromise or hypoxemia  · If patient has worsening hypoxemia and is not being maintained on the current set up and patient may require Vapotherm oxygenation and nursing order was placed for that  · Patient may be required to be transferred to progressive care unit  · If patient has worsening respiratory compromise in spite of being on Vapotherm oxygenation then patient may require noninvasive/invasive ventilatory support  · Trend the inflammatory markers  · Continue Lovenox was increased to 1 mg/kg body weight twice a day  · Patient also has elevated ferritin levels-patient was started on baricitinib by internal medicine team is to be continued for total of 14 days  · Bronchodilators can be continued  · IV Solu-Medrol to continue  · Nicotine replacement therapy if the patient wants  · PUD and DVT prophylaxis        Patient's clinical status is precarious and has potential for decompensation needs to monitor closely    Case discussed with nursing    Electronically signed by:  Elissa Leong MD    1/23/2022    11:24 PM.

## 2022-01-24 NOTE — CARE COORDINATION
1/24/22 Pt from home, IPTA, Covid+, currently on 15LNC, following for needs as pt gets closer to d/c, PT/OT following as well.

## 2022-01-24 NOTE — PROGRESS NOTES
Assessment completed (see doc flow sheet), in bed resting, bed alarm activated, call light within reach, without distress, vital signs stable (see doc flow sheet), denies needs at present time, will continue to monitor and treat. Lucho Simon RN

## 2022-01-25 LAB
ALBUMIN SERPL-MCNC: 3.5 G/DL (ref 3.4–5)
ANION GAP SERPL CALCULATED.3IONS-SCNC: 12 MMOL/L (ref 3–16)
BASOPHILS ABSOLUTE: 0 K/UL (ref 0–0.2)
BASOPHILS RELATIVE PERCENT: 0.1 %
BUN BLDV-MCNC: 30 MG/DL (ref 7–20)
C-REACTIVE PROTEIN: <3 MG/L (ref 0–5.1)
CALCIUM SERPL-MCNC: 8.9 MG/DL (ref 8.3–10.6)
CHLORIDE BLD-SCNC: 101 MMOL/L (ref 99–110)
CO2: 24 MMOL/L (ref 21–32)
CREAT SERPL-MCNC: <0.5 MG/DL (ref 0.8–1.3)
EOSINOPHILS ABSOLUTE: 0 K/UL (ref 0–0.6)
EOSINOPHILS RELATIVE PERCENT: 0 %
GFR AFRICAN AMERICAN: >60
GFR NON-AFRICAN AMERICAN: >60
GLUCOSE BLD-MCNC: 116 MG/DL (ref 70–99)
HCT VFR BLD CALC: 41.4 % (ref 40.5–52.5)
HEMOGLOBIN: 13.7 G/DL (ref 13.5–17.5)
LYMPHOCYTES ABSOLUTE: 1.3 K/UL (ref 1–5.1)
LYMPHOCYTES RELATIVE PERCENT: 15.4 %
MCH RBC QN AUTO: 29.5 PG (ref 26–34)
MCHC RBC AUTO-ENTMCNC: 33 G/DL (ref 31–36)
MCV RBC AUTO: 89.4 FL (ref 80–100)
MONOCYTES ABSOLUTE: 0.9 K/UL (ref 0–1.3)
MONOCYTES RELATIVE PERCENT: 10.1 %
NEUTROPHILS ABSOLUTE: 6.3 K/UL (ref 1.7–7.7)
NEUTROPHILS RELATIVE PERCENT: 74.4 %
PDW BLD-RTO: 13.3 % (ref 12.4–15.4)
PHOSPHORUS: 3.7 MG/DL (ref 2.5–4.9)
PLATELET # BLD: 279 K/UL (ref 135–450)
PMV BLD AUTO: 7.3 FL (ref 5–10.5)
POTASSIUM SERPL-SCNC: 4.5 MMOL/L (ref 3.5–5.1)
RBC # BLD: 4.64 M/UL (ref 4.2–5.9)
SODIUM BLD-SCNC: 137 MMOL/L (ref 136–145)
WBC # BLD: 8.4 K/UL (ref 4–11)

## 2022-01-25 PROCEDURE — 86140 C-REACTIVE PROTEIN: CPT

## 2022-01-25 PROCEDURE — 97110 THERAPEUTIC EXERCISES: CPT

## 2022-01-25 PROCEDURE — 97116 GAIT TRAINING THERAPY: CPT

## 2022-01-25 PROCEDURE — 6360000002 HC RX W HCPCS: Performed by: INTERNAL MEDICINE

## 2022-01-25 PROCEDURE — 36415 COLL VENOUS BLD VENIPUNCTURE: CPT

## 2022-01-25 PROCEDURE — 97530 THERAPEUTIC ACTIVITIES: CPT

## 2022-01-25 PROCEDURE — 85025 COMPLETE CBC W/AUTO DIFF WBC: CPT

## 2022-01-25 PROCEDURE — 94640 AIRWAY INHALATION TREATMENT: CPT

## 2022-01-25 PROCEDURE — 80069 RENAL FUNCTION PANEL: CPT

## 2022-01-25 PROCEDURE — 94761 N-INVAS EAR/PLS OXIMETRY MLT: CPT

## 2022-01-25 PROCEDURE — 2580000003 HC RX 258: Performed by: INTERNAL MEDICINE

## 2022-01-25 PROCEDURE — 6370000000 HC RX 637 (ALT 250 FOR IP): Performed by: INTERNAL MEDICINE

## 2022-01-25 PROCEDURE — 2700000000 HC OXYGEN THERAPY PER DAY

## 2022-01-25 PROCEDURE — 1200000000 HC SEMI PRIVATE

## 2022-01-25 RX ADMIN — OXYCODONE HYDROCHLORIDE 15 MG: 5 TABLET ORAL at 20:55

## 2022-01-25 RX ADMIN — ASPIRIN 81 MG: 81 TABLET, COATED ORAL at 08:27

## 2022-01-25 RX ADMIN — METHYLPREDNISOLONE SODIUM SUCCINATE 40 MG: 40 INJECTION, POWDER, LYOPHILIZED, FOR SOLUTION INTRAMUSCULAR; INTRAVENOUS at 06:08

## 2022-01-25 RX ADMIN — TRAZODONE HYDROCHLORIDE 25 MG: 50 TABLET ORAL at 19:50

## 2022-01-25 RX ADMIN — BARICITINIB 4 MG: 2 TABLET, FILM COATED ORAL at 08:26

## 2022-01-25 RX ADMIN — METHYLPREDNISOLONE SODIUM SUCCINATE 40 MG: 40 INJECTION, POWDER, LYOPHILIZED, FOR SOLUTION INTRAMUSCULAR; INTRAVENOUS at 18:18

## 2022-01-25 RX ADMIN — Medication 3 MG: at 19:50

## 2022-01-25 RX ADMIN — SODIUM CHLORIDE, PRESERVATIVE FREE 10 ML: 5 INJECTION INTRAVENOUS at 19:49

## 2022-01-25 RX ADMIN — Medication 1000 UNITS: at 19:50

## 2022-01-25 RX ADMIN — OXYCODONE HYDROCHLORIDE 15 MG: 5 TABLET ORAL at 16:36

## 2022-01-25 RX ADMIN — GUAIFENESIN 600 MG: 600 TABLET, EXTENDED RELEASE ORAL at 08:25

## 2022-01-25 RX ADMIN — Medication 2 PUFF: at 07:50

## 2022-01-25 RX ADMIN — GUAIFENESIN 600 MG: 600 TABLET, EXTENDED RELEASE ORAL at 19:51

## 2022-01-25 RX ADMIN — OXYCODONE HYDROCHLORIDE AND ACETAMINOPHEN 500 MG: 500 TABLET ORAL at 08:26

## 2022-01-25 RX ADMIN — ZINC SULFATE 220 MG (50 MG) CAPSULE 50 MG: CAPSULE at 18:19

## 2022-01-25 RX ADMIN — OXYCODONE HYDROCHLORIDE 15 MG: 5 TABLET ORAL at 03:22

## 2022-01-25 RX ADMIN — OXYCODONE HYDROCHLORIDE 15 MG: 5 TABLET ORAL at 12:40

## 2022-01-25 RX ADMIN — ENOXAPARIN SODIUM 90 MG: 100 INJECTION SUBCUTANEOUS at 08:28

## 2022-01-25 RX ADMIN — FAMOTIDINE 20 MG: 20 TABLET ORAL at 19:49

## 2022-01-25 RX ADMIN — ENOXAPARIN SODIUM 90 MG: 100 INJECTION SUBCUTANEOUS at 19:51

## 2022-01-25 RX ADMIN — Medication 200 MG: at 08:25

## 2022-01-25 RX ADMIN — OXYCODONE HYDROCHLORIDE 15 MG: 5 TABLET ORAL at 08:26

## 2022-01-25 RX ADMIN — Medication 2 PUFF: at 20:41

## 2022-01-25 RX ADMIN — FAMOTIDINE 20 MG: 20 TABLET ORAL at 08:26

## 2022-01-25 ASSESSMENT — PAIN SCALES - GENERAL
PAINLEVEL_OUTOF10: 6
PAINLEVEL_OUTOF10: 9
PAINLEVEL_OUTOF10: 7
PAINLEVEL_OUTOF10: 10
PAINLEVEL_OUTOF10: 7
PAINLEVEL_OUTOF10: 10
PAINLEVEL_OUTOF10: 6
PAINLEVEL_OUTOF10: 7

## 2022-01-25 ASSESSMENT — PAIN DESCRIPTION - DESCRIPTORS: DESCRIPTORS: ACHING

## 2022-01-25 ASSESSMENT — PAIN DESCRIPTION - FREQUENCY: FREQUENCY: CONTINUOUS

## 2022-01-25 ASSESSMENT — PAIN DESCRIPTION - ORIENTATION
ORIENTATION: RIGHT;LEFT
ORIENTATION: LEFT

## 2022-01-25 ASSESSMENT — PAIN DESCRIPTION - LOCATION
LOCATION: SHOULDER
LOCATION: GENERALIZED
LOCATION: GENERALIZED
LOCATION: SHOULDER

## 2022-01-25 ASSESSMENT — PAIN DESCRIPTION - PAIN TYPE
TYPE: CHRONIC PAIN

## 2022-01-25 ASSESSMENT — PAIN SCALES - WONG BAKER
WONGBAKER_NUMERICALRESPONSE: 6
WONGBAKER_NUMERICALRESPONSE: 6

## 2022-01-25 NOTE — PROGRESS NOTES
INPATIENT PULMONARY CRITICAL CARE PROGRESS NOTE      Reason for visit    respiratory failure, COVID, increasing oxygen requirements    SUBJECTIVE: Patient when evaluated was having profound shortness of breath and patient continues to be having significant hypoxemia, patient was on 15 L of high flow oxygen with intermittent nonrebreather facemask, patient was having slightly improved work of breathing,Patient was afebrile and hemodynamically maintained, patient's p.o. intake has borderline, patient's urine output is as documented was adequate with cumulative fluid balance of - 2.8 L , patient's glycemic control was acceptable, no other pertinent review of system of concern             Physical Exam:  Blood pressure 115/68, pulse 82, temperature 97.1 °F (36.2 °C), temperature source Oral, resp. rate 22, height 5' 10\" (1.778 m), weight 191 lb 5.8 oz (86.8 kg), SpO2 94 %.'         In-person bedside physical examination deferred. Pursuant to the emergency declaration under the [address] [place] [address] and the [employer] Act, this clinical encounter was conducted to provide necessary medical care. (Also consistent with new provisions and guidance offered by [facility] on March 18, 2020 in setting of COVID 19 outbreak and in order to preserve personal protective equipment in accordance with the flexibilities announced by CMS on March 30, 2020)   References: [email or web address]://[facility]. [email or web address]/Portals/[email or web address]/Resources/COVID-19/3_18%20Telemed%20Guidance%20Updated%20March%2018. pdf?[email or web address]                      [email or web address]://[facility]. [email or web address]/Portals/0/Resources/COVID-19/3_18%20Telemed%20Guidance%20Updated%20March%2018. pdf?[email or web address]                      [email or web address]             Results:  CBC:   Recent Labs     01/22/22  0656 01/23/22  0654 01/24/22  0704   WBC 8.9 7.0 8.1   HGB 13.2* 12.3* 12.8*   HCT 40.4* 38.5* 39.4*   MCV 91.4 91.2 90.2    253 289     BMP:   Recent Labs     01/22/22  0656 01/23/22  0654 01/24/22  0704   * 139 136   K 5.3* 4.6 4.0    102 102   CO2 25 22 25   PHOS 3.8 3.8 3.0   BUN 28* 27* 23*   CREATININE <0.5* 0.6* <0.5*       Imaging:  I have reviewed radiology images personally. XR CHEST PORTABLE   Final Result   Bilateral pulmonary opacification concerning for multifocal pneumonia   including COVID-19 pneumonia. Assessment:  Active Problems:    Acute hypoxemic respiratory failure (HCC)    COPD (chronic obstructive pulmonary disease) (HCC)    Tachycardia    Tachypnea    Elevated lactic acid level    Pulmonary infiltrates    Current smoker    Marijuana use    Elevated C-reactive protein (CRP)    Overweight (BMI 25.0-29. 9)    Chronic prescription opiate use    Hyperglycemia    Elevated d-dimer    Elevated ferritin level  Resolved Problems:    * No resolved hospital problems. *          Plan:   · Oxygen supplementation to keep saturation.   90-94% only  · Please titrate oxygen as per above parameters  · Patient's oxygen requirements seems to be stabilizing and patient was still on 15 L of high flow oxygen but the requirement for nonrebreather facemask is only intermittent  · Please monitor for any worsening respiratory compromise or hypoxemia  · If patient has worsening hypoxemia and is not being maintained on the current set up and patient may require Vapotherm oxygenation and nursing order was placed for that  · If patient has worsening respiratory compromise in spite of being on Vapotherm oxygenation then patient may require noninvasive/invasive ventilatory support  · Trend the inflammatory markers  · Continue Lovenox was increased to 1 mg/kg body weight twice a day  · Patient also has elevated ferritin levels-patient was started on baricitinib by internal medicine team is to be continued for total of 14 days  · Bronchodilators can be continued  · IV Solu-Medrol to continue  · Monitor for any worsening hyperglycemia  · Monitor input output and BMP  · Correct electrolytes on whenever asleep basis  · Nicotine replacement therapy if the patient wants  · PUD and DVT prophylaxis        Patient's clinical status seems to be stabilizing from pulmonary standpoint of view and patient oxygen requirements have come down some extent but still high and needs to monitor closely    No other recommendations from pulmonary/critical care standpoint of view- will sign off-please call on whenever necessary basis if patient has any worsening respiratory status or hypoxemia    Electronically signed by:  Elza Walsh MD    1/24/2022    10:44 PM.

## 2022-01-25 NOTE — PROGRESS NOTES
Comprehensive Nutrition Assessment    Type and Reason for Visit:  Initial,RD Nutrition Re-Screen/LOS    Nutrition Recommendations/Plan:   1. Continue regular diet with ONS BID  2. Encourage PO intakes  3. Monitor nutrition adequacy, pertinent labs, bowel habits, wt changes, and clinical progress    Nutrition Assessment:  LOS assessment: Pt presented to ED with SOB d/t URI, and N/V x5 days per EMR. Hx of COPD. COVID-19 positive, in droplet plus precautions. High flow nasal cannula 15 L/min. Spoke with pt on phone about current nutrition status. Pt reported appetite has been \"alright\" with PO intakes of % per EMR. Pt reported he will drink entire ONS if it is strawberry flavored. Will order. Pt denied any N/V/C/D. Pt unsure of any weight loss. Will continue to monitor. Malnutrition Assessment:  Malnutrition Status: At risk for malnutrition (Comment)    Context:  Acute Illness       Estimated Daily Nutrient Needs:  Energy (kcal):  4286-4443; Weight Used for Energy Requirements:  Ideal (75 kg)     Protein (g):  75-90; Weight Used for Protein Requirements:  Ideal (1-1.2 g/kg)        Fluid (ml/day):  2006-7889; Method Used for Fluid Requirements:  1 ml/kcal      Nutrition Related Findings:  + BM 1/24 per pt. Wounds:  None       Current Nutrition Therapies:    ADULT ORAL NUTRITION SUPPLEMENT; Lunch, Dinner; Standard High Calorie/High Protein Oral Supplement  ADULT DIET; Regular    Anthropometric Measures:  · Height: 5' 10\" (177.8 cm)  · Current Body Weight: 191 lb 5.8 oz (86.8 kg)    · Ideal Body Weight: 166 lbs; % Ideal Body Weight 115.3 %   · BMI: 27.5  · BMI Categories: Overweight (BMI 25.0-29. 9)       Nutrition Diagnosis:   Increased nutrient needs related to increased nutrient needs as evidenced by respiratory function and COVID-19.     Nutrition Interventions:   Food and/or Nutrient Delivery:  Continue Current Diet,Continue Oral Nutrition Supplement  Nutrition Education/Counseling:  No recommendation at this time   Coordination of Nutrition Care:  Continue to monitor while inpatient    Goals:  Pt will have PO intakes 50% or greater od meals and supplements during stay.        Nutrition Monitoring and Evaluation:   Behavioral-Environmental Outcomes:  None Identified   Food/Nutrient Intake Outcomes:  Food and Nutrient Intake,Supplement Intake  Physical Signs/Symptoms Outcomes:  Nutrition Focused Physical Findings,Nausea or Vomiting,Weight     Discharge Planning:    Continue current diet     Electronically signed by Bree Romero on 1/25/22 at 12:16 PM EST    Contact: [phone]

## 2022-01-25 NOTE — PROGRESS NOTES
filed at 1/25/2022 0199  Gross per 24 hour   Intake --   Output 1350 ml   Net -1350 ml       Physical Exam Performed:    /76   Pulse 67   Temp 97.7 °F (36.5 °C) (Oral)   Resp 20   Ht 5' 10\" (1.778 m)   Wt 191 lb 5.8 oz (86.8 kg)   SpO2 92%   BMI 27.46 kg/m²     General appearance: No apparent distress, appears stated age and cooperative. HEENT: Pupils equal, round, and reactive to light. Conjunctivae/corneas clear. Neck: Supple, with full range of motion. No jugular venous distention. Trachea midline. Respiratory: On 15 L O2 via NC. Normal respiratory effort. Diminished breath sounds bilaterally. Cardiovascular: Regular rate and rhythm with normal S1/S2 without murmurs, rubs or gallops. Abdomen: Soft, non-tender, non-distended with normal bowel sounds. Musculoskeletal: No clubbing, cyanosis or edema bilaterally. Full range of motion without deformity. Skin: Skin color, texture, turgor normal.  No rashes or lesions. Neurologic:  Neurovascularly intact without any focal sensory/motor deficits. Cranial nerves: II-XII intact, grossly non-focal.  Psychiatric: Alert and oriented, thought content appropriate, normal insight  Capillary Refill: Brisk,3 seconds, normal   Peripheral Pulses: +2 palpable, equal bilaterally       Labs:   Recent Labs     01/23/22  0654 01/24/22  0704 01/25/22  0630   WBC 7.0 8.1 8.4   HGB 12.3* 12.8* 13.7   HCT 38.5* 39.4* 41.4    289 279     Recent Labs     01/23/22  0654 01/24/22  0704 01/25/22  0630    136 137   K 4.6 4.0 4.5    102 101   CO2 22 25 24   BUN 27* 23* 30*   CREATININE 0.6* <0.5* <0.5*   CALCIUM 7.8* 9.0 8.9   PHOS 3.8 3.0 3.7     No results for input(s): AST, ALT, BILIDIR, BILITOT, ALKPHOS in the last 72 hours. No results for input(s): INR in the last 72 hours. No results for input(s): Cooper Grates in the last 72 hours.     Urinalysis:    No results found for: Nick Ramos, [address], BACTERIA, RBCUA, BLOODU, [address], GLUCOSEU    Radiology:  XR CHEST PORTABLE   Final Result   Bilateral pulmonary opacification concerning for multifocal pneumonia   including COVID-19 pneumonia. Assessment/Plan:    Active Hospital Problems    Diagnosis     Elevated d-dimer [R79.89]     Elevated ferritin level [R79.89]     Pulmonary infiltrates [R91.8]     Current smoker [F17.200]     Marijuana use [F12.90]     Elevated C-reactive protein (CRP) [R79.82]     Overweight (BMI 25.0-29. 9) [E66.3]     Chronic prescription opiate use [Z79.891]     Hyperglycemia [R73.9]     Acute hypoxemic respiratory failure (HCC) [J96.01]     COPD (chronic obstructive pulmonary disease) (HCC) [J44.9]     Tachycardia [R00.0]     Tachypnea [R06.82]     Elevated lactic acid level [R79.89]        Acute hypoxic respiratory failure, POA- likely due to COVID pneumonia  - as evidenced by respiratory distress, use of accessory muscles and O2 saturation of less than 90% on room air on presentation, PCR positive  -Droplet plus isolation  - supplemental oxygen as necessary to keep SaO2 > 90%, not on O2 at home  - Dexamethasone was started. Switched to iv solumedrol 1/19  -baricitnib started 1/19  -pulm consulted, recommending continued oxygen supplementation with titration parameters, if patient continues to require more oxygen to switch to Vapotherm.  - Rapid Influenza A&B negative  - Blood cultures x 2 remain negative  - Respiratory culture negative  - Legionella pneumophilia and Strep pneumoniae urine antigens ordered and neg  - Procalcitonin 0.09 (not elevated)  - Incentive spirometry ordered  - Low intensity Enoxaparin has been started  - Will provide high calorie, high protein dietary supplements  - 25 hydroxy Vitamin D lab ordered, ordered vit d while inpt  - Zinc sulfate 50 mg bid ordered  - Thiamine 200 mg po daily ordered  - Pepcid 20 mg po bid    Cough  -provided antitussive medications as needed     Non-Intractable vomiting with nausea

## 2022-01-25 NOTE — PROGRESS NOTES
Occupational Therapy  Facility/Department: [facility] C5 - MED SURG/ORTHO  Daily Treatment Note  NAME: Kennedi Howard  : 1955  MRN: [id number]    Date of Service: 2022    Discharge Recommendations:  Home with Home health OT,24 hour supervision or assist     Assessment   Performance deficits / Impairments: Decreased ADL status; Decreased safe awareness;Decreased endurance;Decreased functional mobility   Assessment: Pt with good tolerance of OT treatment, grossly SBA for functional transfers, limited mobility by high flow tubing. Pt mod I for bed mobility and has been using urinal without difficulty per pt. Pt tolerating 10 reps therex, limited more by chronic pain from prior injuries than by endurance/strength. Continue OT per POC. Prognosis: Good  OT Education: OT Role;Energy Conservation;Plan of Care;Home Exercise Program  Disease Specific Education: Pt educated on importance of OOB mobility, prevention of complications of bedrest, and general safety during hospitalization. Pt verbalized understanding. Barriers to Learning: none perceived  REQUIRES OT FOLLOW UP: Yes  Activity Tolerance  Activity Tolerance: Patient Tolerated treatment well  Activity Tolerance: Vitals: BP= 124/76, HR= 75, SPO2= 94% 15L HFNC, pt briefly dropped to 88% with sidestepping towad HOB but quickly recovered to 92% in less than 30 secs  Safety Devices  Safety Devices in place: Yes  Type of devices: Left in chair;Call light within reach;Nurse notified;Gait belt         Patient Diagnosis(es): The encounter diagnosis was Acute hypoxemic respiratory failure due to COVID-19 [facility]). has a past medical history of COPD (chronic obstructive pulmonary disease) ([facility] [address] has no past surgical history on file. Restrictions  Restrictions/Precautions  Restrictions/Precautions: Contact Precautions,Fall Risk,Isolation,Up as Tolerated  Position Activity Restriction  Other position/activity restrictions: Droplet plus isolation.  Tested positive for COVID-19 on 1/18/22. 15L of O2 via HFNC. non rebreather PRN. Subjective   General  Chart Reviewed: Yes  Patient assessed for rehabilitation services?: Yes  Additional Pertinent Hx: Acute hypoxic respiratory failure  Response to previous treatment: Patient with no complaints from previous session  Family / Caregiver Present: No  Referring Practitioner: Nely Solo MD  Diagnosis: Covid 19    Subjective  Subjective: Pt resting in bed, agreeable to OT treatment. Pain Assessment  Pain Assessment: Faces  Taylor-Baker Pain Rating: Hurts even more  Pain Type: Chronic pain  Pain Location: Shoulder  Pain Orientation: Left  Non-Pharmaceutical Pain Intervention(s): Repositioned  Pre Treatment Pain Screening  Intervention List: Patient able to continue with treatment  Vital Signs  Patient Currently in Pain: Yes     Orientation  Orientation  Overall Orientation Status: Within Functional Limits     Objective    ADL  Feeding: Independent  Additional Comments: Pt declines need for further ADLs at this time.      Balance  Sitting Balance: Independent  Standing Balance: Stand by assistance  Standing Balance  Activity: side stepping toward HOB, bed to chair stand step transfer  Comment: no AD    Bed mobility  Supine to Sit: Modified independent     Transfers  Stand Pivot Transfers: Stand by assistance  Sit to stand: Stand by assistance  Stand to sit: Stand by assistance     Cognition  Overall Cognitive Status: WFL     Type of ROM/Therapeutic Exercise  Type of ROM/Therapeutic Exercise: AROM  Comment: seated in chair  Exercises  Shoulder Flexion: x10  Horizontal ABduction: x10  Horizontal ADduction: x10  Elbow Flexion: x10  Elbow Extension: x10  Supination: x10  Pronation: x10  Finger Flexion: x10  Finger Extension: x10     Plan   Plan  Times per week: 2-3x/week  Current Treatment Recommendations: Endurance Training,Functional Mobility Training,Self-Care / ADL,Home Management Training,Equipment Evaluation, Education, & procurement,Patient/Caregiver Education & Training,Safety Education & Training    AM-PAC Score  AM-PAC Inpatient Daily Activity Raw Score: 20 (01/25/22 1209)  AM-PAC Inpatient ADL T-Scale Score : 42.03 (01/25/22 1209)  ADL Inpatient CMS 0-100% Score: 38.32 (01/25/22 1209)  ADL Inpatient CMS G-Code Modifier : CJ (01/25/22 1209)    Goals  Short term goals  Time Frame for Short term goals: 1 week (1/30) unless stated otherwise. Short term goal 1: Pt will LB dress with supervision, taking rest breaks PRN, and AD PRN. -- ongoing 1/25/22  Short term goal 2: Pt will perform at least 2min of standing functional activities. -- ongoing 1/25/22  Short term goal 3: Pt will perform BUE ther ex x20 to increase endurance for functional activities (1/28)-- ongoing 1/25/22  Short term goal 4: Pt will toilet with supervision and AD PRN. -- ongoing 1/25/22  Patient Goals   Patient goals : \"just leave here\"       Therapy Time   Individual Concurrent Group Co-treatment   Time In 1130         Time Out 1208         Minutes [address] [facility], ARANGO/L

## 2022-01-25 NOTE — PROGRESS NOTES
Physical Therapy  Facility/Department: [facility] C5 - MED SURG/ORTHO  Daily Treatment Note  NAME: Margaret Casas  : 1955  MRN: [id number]    Date of Service: 2022    Discharge Recommendations:  24 hour supervision or assist,Home with Home health PT   PT Equipment Recommendations  Equipment Needed: No    Assessment   Body structures, Functions, Activity limitations: Decreased functional mobility ; Decreased ADL status; Decreased strength;Decreased endurance;Decreased balance  Assessment: Pt with good tolerance of PT treatment, grossly SBA for functional transfers, limited mobility by high flow tubing. Pt mod I for bed mobility  Pt tolerating 10 reps therex, limited more by chronic pain from prior injuries than by endurance/strength. Continue PT per POC. Treatment Diagnosis: decreased independence with functional mobility. Prognosis: Good  Decision Making: Medium Complexity  Barriers to Learning: none  REQUIRES PT FOLLOW UP: Yes  Activity Tolerance  Activity Tolerance: Patient Tolerated treatment well;Patient limited by endurance  Activity Tolerance: 117/76  HR 67  O2 94%, decr to 88% w amb while on 15L     Patient Diagnosis(es): The encounter diagnosis was Acute hypoxemic respiratory failure due to COVID-19 [facility]). has a past medical history of COPD (chronic obstructive pulmonary disease) ([facility] [address] has no past surgical history on file. Restrictions  Restrictions/Precautions  Restrictions/Precautions: Contact Precautions,Fall Risk,Isolation,Up as Tolerated  Position Activity Restriction  Other position/activity restrictions: Droplet plus isolation. Tested positive for COVID-19 on 22. 15L of O2 via HFNC. non rebreather PRN. Subjective   General  Chart Reviewed: Yes  Additional Pertinent Hx: COVID-19  Response To Previous Treatment: Patient with no complaints from previous session.   Family / Caregiver Present: No  Referring Practitioner: Rad Santana MD  Subjective  Subjective: Patient agreed to participate. General Comment  Comments: Supine in chair upon entry of therapy staff. Cleared for therapy by RN. Pain Screening  Patient Currently in Pain: Yes  Pain Assessment  Pain Assessment: Faces  Taylor-Baker Pain Rating: Hurts even more  Pain Location: Shoulder  Pain Orientation: Right;Left  Pain 2  Non-Pharmaceutical Pain Intervention(s) 2: Ambulation/Increased Activity;Repositioned  Vital Signs  Patient Currently in Pain: Yes       Orientation  Orientation  Overall Orientation Status: Within Functional Limits  Cognition      Objective   Bed mobility  Sit to Supine: Modified independent  Transfers  Sit to Stand: Supervision  Stand to sit: Supervision  Bed to Chair: Supervision  Ambulation  Ambulation?: Yes  More Ambulation?: No  Ambulation 1  Surface: level tile  Device: No Device  Assistance: Stand by assistance  Quality of Gait: forward flexed posture, cueing to correct. Gait Deviations: Slow Mayra;Decreased step length;Decreased step height  Distance: 8ft, limited by HF tubing  Comments: 3x, No complaints of chest pain, dizziness. he did have some shortness of breath on exertion. SATs at 88% after amb     Balance  Posture: Fair  Sitting - Static: Good  Sitting - Dynamic: Good  Standing - Static: Good;-  Standing - Dynamic: Fair;+  Exercises  Hip Flexion: 1 x 10 bilateral  Knee Long Arc Quad: 1 x 10 bilateral  Ankle Pumps: 1 x 10 bilateral             AM-PAC Score     AM-PAC Inpatient Mobility without Stair Climbing Raw Score : 18 (01/25/22 1333)  AM-PAC Inpatient without Stair Climbing T-Scale Score : 51.97 (01/25/22 1333)  Mobility Inpatient CMS 0-100% Score: 23.26 (01/25/22 1333)  Mobility Inpatient without Stair CMS G-Code Modifier : Kait Terrell (01/25/22 1333)       Goals  Short term goals  Time Frame for Short term goals: 1 week 1/30/22  Short term goal 1: Supine <> sit with modified independence.   -1/25 met  Short term goal 2: Sit <> stand with modified independence   -1/25 spv  Short term goal 3: Bed <> chair with least restrictive assistive device and modified independence   -1/25 spv  Short term goal 4: Ambulate 100 feet with least restrictive assistive device and modified independence. -1/25 8' x 3 NAD sba  Short term goal 5: By 1/26/22 Patient will tolerate 12-15 reps of his exercises to maximize his strength/endurance   -1/25 on-going  Patient Goals   Patient goals : To improve his endurance. To go home. Plan    Plan  Times per week: 2-3/week  Plan weeks: 1 week 1/30/22  Specific instructions for Next Treatment: progress mobility as tolerated  Current Treatment Recommendations: Strengthening,Safety Education & Training,Home Exercise Program,Balance [employer] Training,Patient/Caregiver Education & Training,Functional Mobility Training,Equipment Evaluation, Education, & procurement,Transfer Training,Gait Training,Positioning,ADL/Self-care Training  Safety Devices  Type of devices:  All fall risk precautions in place,Bed alarm in place,Call light within reach,Gait belt,Left in bed,Nurse notified     Therapy Time   Individual Concurrent Group Co-treatment   Time In 1210         Time Out 1248         Minutes 38         Timed Code Treatment Minutes: Shyann Ahn

## 2022-01-25 NOTE — PLAN OF CARE
Problem: Nutrition Deficits  Goal: Optimize nutritional status  Outcome: Ongoing  Note: Nutrition Problem #1: Increased nutrient needs  Intervention: Food and/or Nutrient Delivery: Continue Current Diet,Continue Oral Nutrition Supplement  Nutritional Goals: Pt will have PO intakes 50% or greater od meals and supplements during stay.

## 2022-01-26 LAB
ALBUMIN SERPL-MCNC: 3.2 G/DL (ref 3.4–5)
ANION GAP SERPL CALCULATED.3IONS-SCNC: 9 MMOL/L (ref 3–16)
BASOPHILS ABSOLUTE: 0 K/UL (ref 0–0.2)
BASOPHILS RELATIVE PERCENT: 0.1 %
BUN BLDV-MCNC: 29 MG/DL (ref 7–20)
C-REACTIVE PROTEIN: <3 MG/L (ref 0–5.1)
CALCIUM SERPL-MCNC: 8.7 MG/DL (ref 8.3–10.6)
CHLORIDE BLD-SCNC: 101 MMOL/L (ref 99–110)
CO2: 27 MMOL/L (ref 21–32)
CREAT SERPL-MCNC: <0.5 MG/DL (ref 0.8–1.3)
EOSINOPHILS ABSOLUTE: 0 K/UL (ref 0–0.6)
EOSINOPHILS RELATIVE PERCENT: 0.2 %
GFR AFRICAN AMERICAN: >60
GFR NON-AFRICAN AMERICAN: >60
GLUCOSE BLD-MCNC: 75 MG/DL (ref 70–99)
HCT VFR BLD CALC: 44 % (ref 40.5–52.5)
HEMOGLOBIN: 14.2 G/DL (ref 13.5–17.5)
LYMPHOCYTES ABSOLUTE: 1.9 K/UL (ref 1–5.1)
LYMPHOCYTES RELATIVE PERCENT: 23.6 %
MCH RBC QN AUTO: 29.7 PG (ref 26–34)
MCHC RBC AUTO-ENTMCNC: 32.2 G/DL (ref 31–36)
MCV RBC AUTO: 92.3 FL (ref 80–100)
MONOCYTES ABSOLUTE: 0.9 K/UL (ref 0–1.3)
MONOCYTES RELATIVE PERCENT: 11.1 %
NEUTROPHILS ABSOLUTE: 5.4 K/UL (ref 1.7–7.7)
NEUTROPHILS RELATIVE PERCENT: 65 %
PDW BLD-RTO: 13.7 % (ref 12.4–15.4)
PHOSPHORUS: 3 MG/DL (ref 2.5–4.9)
PLATELET # BLD: 281 K/UL (ref 135–450)
PMV BLD AUTO: 7.2 FL (ref 5–10.5)
POTASSIUM SERPL-SCNC: 4.3 MMOL/L (ref 3.5–5.1)
RBC # BLD: 4.77 M/UL (ref 4.2–5.9)
SODIUM BLD-SCNC: 137 MMOL/L (ref 136–145)
WBC # BLD: 8.2 K/UL (ref 4–11)

## 2022-01-26 PROCEDURE — 94640 AIRWAY INHALATION TREATMENT: CPT

## 2022-01-26 PROCEDURE — 97110 THERAPEUTIC EXERCISES: CPT

## 2022-01-26 PROCEDURE — 2700000000 HC OXYGEN THERAPY PER DAY

## 2022-01-26 PROCEDURE — 97116 GAIT TRAINING THERAPY: CPT

## 2022-01-26 PROCEDURE — 2580000003 HC RX 258: Performed by: INTERNAL MEDICINE

## 2022-01-26 PROCEDURE — 85025 COMPLETE CBC W/AUTO DIFF WBC: CPT

## 2022-01-26 PROCEDURE — 94760 N-INVAS EAR/PLS OXIMETRY 1: CPT

## 2022-01-26 PROCEDURE — 6360000002 HC RX W HCPCS: Performed by: INTERNAL MEDICINE

## 2022-01-26 PROCEDURE — 6370000000 HC RX 637 (ALT 250 FOR IP): Performed by: INTERNAL MEDICINE

## 2022-01-26 PROCEDURE — 1200000000 HC SEMI PRIVATE

## 2022-01-26 PROCEDURE — 36415 COLL VENOUS BLD VENIPUNCTURE: CPT

## 2022-01-26 PROCEDURE — 94761 N-INVAS EAR/PLS OXIMETRY MLT: CPT

## 2022-01-26 PROCEDURE — 86140 C-REACTIVE PROTEIN: CPT

## 2022-01-26 PROCEDURE — 80069 RENAL FUNCTION PANEL: CPT

## 2022-01-26 RX ADMIN — Medication 200 MG: at 08:51

## 2022-01-26 RX ADMIN — SODIUM CHLORIDE, PRESERVATIVE FREE 10 ML: 5 INJECTION INTRAVENOUS at 21:04

## 2022-01-26 RX ADMIN — ZINC SULFATE 220 MG (50 MG) CAPSULE 50 MG: CAPSULE at 16:39

## 2022-01-26 RX ADMIN — METHYLPREDNISOLONE SODIUM SUCCINATE 40 MG: 40 INJECTION, POWDER, LYOPHILIZED, FOR SOLUTION INTRAMUSCULAR; INTRAVENOUS at 17:03

## 2022-01-26 RX ADMIN — METHYLPREDNISOLONE SODIUM SUCCINATE 40 MG: 40 INJECTION, POWDER, LYOPHILIZED, FOR SOLUTION INTRAMUSCULAR; INTRAVENOUS at 05:23

## 2022-01-26 RX ADMIN — ASPIRIN 81 MG: 81 TABLET, COATED ORAL at 08:51

## 2022-01-26 RX ADMIN — OXYCODONE HYDROCHLORIDE 15 MG: 5 TABLET ORAL at 20:57

## 2022-01-26 RX ADMIN — OXYCODONE HYDROCHLORIDE 15 MG: 5 TABLET ORAL at 16:38

## 2022-01-26 RX ADMIN — GUAIFENESIN 600 MG: 600 TABLET, EXTENDED RELEASE ORAL at 20:57

## 2022-01-26 RX ADMIN — BARICITINIB 4 MG: 2 TABLET, FILM COATED ORAL at 08:51

## 2022-01-26 RX ADMIN — ENOXAPARIN SODIUM 90 MG: 100 INJECTION SUBCUTANEOUS at 20:57

## 2022-01-26 RX ADMIN — OXYCODONE HYDROCHLORIDE 15 MG: 5 TABLET ORAL at 08:51

## 2022-01-26 RX ADMIN — ENOXAPARIN SODIUM 90 MG: 100 INJECTION SUBCUTANEOUS at 08:52

## 2022-01-26 RX ADMIN — GUAIFENESIN 600 MG: 600 TABLET, EXTENDED RELEASE ORAL at 08:51

## 2022-01-26 RX ADMIN — OXYCODONE HYDROCHLORIDE AND ACETAMINOPHEN 500 MG: 500 TABLET ORAL at 08:51

## 2022-01-26 RX ADMIN — OXYCODONE HYDROCHLORIDE 15 MG: 5 TABLET ORAL at 01:25

## 2022-01-26 RX ADMIN — Medication 2 PUFF: at 09:05

## 2022-01-26 RX ADMIN — FAMOTIDINE 20 MG: 20 TABLET ORAL at 20:57

## 2022-01-26 RX ADMIN — OXYCODONE HYDROCHLORIDE 15 MG: 5 TABLET ORAL at 12:04

## 2022-01-26 RX ADMIN — FAMOTIDINE 20 MG: 20 TABLET ORAL at 08:51

## 2022-01-26 RX ADMIN — Medication 3 MG: at 20:57

## 2022-01-26 RX ADMIN — OXYCODONE HYDROCHLORIDE 15 MG: 5 TABLET ORAL at 05:23

## 2022-01-26 RX ADMIN — TRAZODONE HYDROCHLORIDE 25 MG: 50 TABLET ORAL at 20:57

## 2022-01-26 RX ADMIN — Medication 1000 UNITS: at 20:57

## 2022-01-26 RX ADMIN — Medication 2 PUFF: at 20:14

## 2022-01-26 ASSESSMENT — PAIN SCALES - GENERAL
PAINLEVEL_OUTOF10: 10
PAINLEVEL_OUTOF10: 7

## 2022-01-26 ASSESSMENT — PAIN DESCRIPTION - FREQUENCY: FREQUENCY: CONTINUOUS

## 2022-01-26 ASSESSMENT — PAIN DESCRIPTION - DESCRIPTORS: DESCRIPTORS: ACHING

## 2022-01-26 ASSESSMENT — PAIN DESCRIPTION - LOCATION: LOCATION: GENERALIZED

## 2022-01-26 ASSESSMENT — PAIN SCALES - WONG BAKER: WONGBAKER_NUMERICALRESPONSE: 4

## 2022-01-26 ASSESSMENT — PAIN DESCRIPTION - PAIN TYPE: TYPE: CHRONIC PAIN

## 2022-01-26 NOTE — CARE COORDINATION
Care being managed by [facility]. LOS 8. Here w Covid and on 15L. Was IPTA. Following for poss HHC or 02 needs.   Shayy Salas RN

## 2022-01-26 NOTE — PROGRESS NOTES
Physical Therapy  Facility/Department: [facility] C5 - MED SURG/ORTHO  Daily Treatment Note  NAME: Bentley Sanchez  : 1955  MRN: [id number]    Date of Service: 2022    Discharge Recommendations:  24 hour supervision or assist,Home with Home health PT        Assessment   Body structures, Functions, Activity limitations: Decreased functional mobility ; Decreased ADL status; Decreased strength;Decreased endurance;Decreased balance  PT Education: Goals; General Safety;Gait Training;Disease Specific Education;PT Role;Plan of Care; Functional Mobility Training;Pressure Relief; Injury Prevention;Home Exercise Program;Precautions; Energy Conservation;Transfer Training  Patient Education: Patient verbalized understanding  Activity Tolerance  Activity Tolerance: Patient Tolerated treatment well  Activity Tolerance: 137/67 HR 58  O2 96% at rest and 88% w/ standing  marches, recovered w/in a min while on 13 L spo2     Patient Diagnosis(es): The encounter diagnosis was Acute hypoxemic respiratory failure due to COVID-19 [facility]). has a past medical history of COPD (chronic obstructive pulmonary disease) ([facility] [address] has no past surgical history on file. Restrictions  Restrictions/Precautions  Restrictions/Precautions: Contact Precautions,Fall Risk,Isolation,Up as Tolerated  Position Activity Restriction  Other position/activity restrictions: Droplet plus isolation. Tested positive for COVID-19 on 22. 15L of O2 via HFNC. non rebreather PRN. Subjective   General  Chart Reviewed: Yes  Additional Pertinent Hx: COVID-19  Response To Previous Treatment: Patient with no complaints from previous session. Family / Caregiver Present: No  Referring Practitioner: Moriah Dewey MD  Subjective  Subjective: Patient agreed to participate. General Comment  Comments: Supine in bed upon entry of therapy staff. Cleared for therapy by RN.   Pain Screening  Patient Currently in Pain: Yes  Pain Assessment  Pain Assessment: Faces  Taylor-Baker Pain Rating: Hurts little more  Non-Pharmaceutical Pain Intervention(s): Ambulation/Increased Activity;Repositioned  Vital Signs  Patient Currently in Pain: Yes       Orientation  Orientation  Overall Orientation Status: Within Normal Limits  Cognition      Objective   Bed mobility  Supine to Sit: Modified independent  Sit to Supine: Modified independent  Transfers  Sit to Stand: Supervision  Stand to sit: Supervision  Ambulation  Ambulation?: Yes  Ambulation 1  Surface: level tile  Device: No Device  Assistance: Stand by assistance  Quality of Gait: cueing to correct. Gait Deviations: Slow Mayra;Decreased step length;Decreased step height  Distance: 8ft, limited by HF tubing  Comments: 2x, No complaints of dizziness.   SATs at or above 92% throughout     Balance  Posture: Fair  Sitting - Static: Good  Sitting - Dynamic: Good  Standing - Static: Good  Standing - Dynamic: Fair;+  Exercises  Hip Flexion: 1 x 10 bilateral sitting and standing  Hip Abduction: 1 x 10 bilateral  Knee Long Arc Quad: 1 x 10 bilateral  Ankle Pumps: AAROM x 5 each (pt states he has bilat foot drop)  Comments: De-sat to 88% while standing marching             AM-PAC Score     AM-PAC Inpatient Mobility without Stair Climbing Raw Score : 18 (01/26/22 1428)  AM-PAC Inpatient without Stair Climbing T-Scale Score : 51.97 (01/26/22 1428)  Mobility Inpatient CMS 0-100% Score: 23.26 (01/26/22 1428)  Mobility Inpatient without Stair CMS G-Code Modifier : CJ (01/26/22 1428)       Goals  Short term goals  Time Frame for Short term goals: 1 week 1/30/22  Short term goal 1: Supine <> sit with modified independence.  -1/26 met  Short term goal 2: Sit <> stand with modified independence   -1/26 spv  Short term goal 3: Bed <> chair with least restrictive assistive device and modified independence   -1/26 spv NAD  Short term goal 4: Ambulate 100 feet with least restrictive assistive device and modified independence.   -1/26 8' x 2 NAD sba  Short term goal 5: By 1/26/22 Patient will tolerate 12-15 reps of his exercises to maximize his strength/endurance   -1/26 on-going  Patient Goals   Patient goals : To improve his endurance. To go home. Plan    Plan  Times per week: 2-3/week  Plan weeks: 1 week 1/30/22  Specific instructions for Next Treatment: progress mobility as tolerated  Current Treatment Recommendations: Strengthening,Safety Education & Training,Home Exercise Program,Balance [employer] Training,Patient/Caregiver Education & Training,Functional Mobility Training,Equipment Evaluation, Education, & procurement,Transfer Training,Gait Training,Positioning,ADL/Self-care Training  Safety Devices  Type of devices:  All fall risk precautions in place,Bed alarm in place,Call light within reach,Gait belt,Left in bed,Nurse notified     Therapy Time   Individual Concurrent Group Co-treatment   Time In 1345         Time Out 1415         Minutes 30         Timed Code Treatment Minutes: Shyann Ahn

## 2022-01-26 NOTE — PROGRESS NOTES
Hospitalist Progress Note      PCP: No primary care provider on file. Date of Admission: 1/18/2022    Chief Complaint:   Shortness of breath     Hospital Course:   Pt is an 78y.o. year-old male with a history of COPD who presents to the emergency room for evaluation following a 2-week history of shortness of breath, and intermittent nonproductive cough and intermittent episodes of emesis.  The emesis is nonbilious nonbloody.  In the emergency room he was found to have an oxygen saturation of 83% on room air.  He had a rapid COVID test performed which was negative.  However, imaging has findings suggestive of a COVID-19 pneumonia. [facility] is being admitted for further evaluation and treatment. Associated signs and symptoms do not include fever or chills, abdominal pain, hemoptysis, hematochezia, diarrhea, constipation or urinary symptoms. Subjective:   Seen resting comfortably in bed. States his breathing continues to improve. No other complaints at this time. Appetite good. Denies chest pain, abdominal pain, N/V/D, lightheadedness or dizziness, or any focal neurologic deficit.      Medications:  Reviewed    Infusion Medications    sodium chloride 25 mL (01/19/22 3913)     Scheduled Medications    Vitamin D  1,000 Units Oral Nightly    traZODone  25 mg Oral Nightly    zinc sulfate  50 mg Oral QPM    ascorbic acid  500 mg Oral Daily    guaiFENesin  600 mg Oral BID    enoxaparin  1 mg/kg SubCUTAneous BID    baricitinib  4 mg Oral Daily    methylPREDNISolone  40 mg IntraVENous Q12H    aspirin EC  81 mg Oral Daily    budesonide-formoterol  2 puff Inhalation BID    sodium chloride flush  5-40 mL IntraVENous 2 times per day    nicotine  1 patch TransDERmal Daily    famotidine  20 mg Oral BID    melatonin  3 mg Oral Nightly    thiamine  200 mg Oral Daily     PRN Meds: oxyCODONE, sodium chloride flush, sodium chloride, polyethylene glycol, acetaminophen **OR** acetaminophen, prochlorperazine, albuterol sulfate HFA, benzonatate      Intake/Output Summary (Last 24 hours) at 1/26/2022 1832  Last data filed at 1/26/2022 0800  Gross per 24 hour   Intake 360 ml   Output 750 ml   Net -390 ml       Physical Exam Performed:    /79   Pulse 58   Temp 97.6 °F (36.4 °C) (Oral)   Resp 18   Ht 5' 10\" (1.778 m)   Wt 191 lb 5.8 oz (86.8 kg)   SpO2 92%   BMI 27.46 kg/m²     General appearance: No apparent distress, appears stated age and cooperative. HEENT: Pupils equal, round, and reactive to light. Conjunctivae/corneas clear. Neck: Supple, with full range of motion. No jugular venous distention. Trachea midline. Respiratory: On 15 L O2 via NC. Normal respiratory effort. Diminished breath sounds bilaterally. Cardiovascular: Regular rate and rhythm with normal S1/S2 without murmurs, rubs or gallops. Abdomen: Soft, non-tender, non-distended with normal bowel sounds. Musculoskeletal: No clubbing, cyanosis or edema bilaterally. Full range of motion without deformity. Skin: Skin color, texture, turgor normal.  No rashes or lesions. Neurologic:  Neurovascularly intact without any focal sensory/motor deficits. Cranial nerves: II-XII intact, grossly non-focal.  Psychiatric: Alert and oriented, thought content appropriate, normal insight  Capillary Refill: Brisk,3 seconds, normal   Peripheral Pulses: +2 palpable, equal bilaterally       Labs:   Recent Labs     01/24/22  0704 01/25/22  0630 01/26/22  0532   WBC 8.1 8.4 8.2   HGB 12.8* 13.7 14.2   HCT 39.4* 41.4 44.0    279 281     Recent Labs     01/24/22  0704 01/25/22  0630 01/26/22  0532    137 137   K 4.0 4.5 4.3    101 101   CO2 25 24 27   BUN 23* 30* 29*   CREATININE <0.5* <0.5* <0.5*   CALCIUM 9.0 8.9 8.7   PHOS 3.0 3.7 3.0     No results for input(s): AST, ALT, BILIDIR, BILITOT, ALKPHOS in the last 72 hours. No results for input(s): INR in the last 72 hours.   No results for input(s): Cooper Grates in the last 72 hours.    Urinalysis:    No results found for: Jonda Deck, BACTERIA, RBCUA, BLOODU, [address], [address]    Radiology:  XR CHEST PORTABLE   Final Result   Bilateral pulmonary opacification concerning for multifocal pneumonia   including COVID-19 pneumonia. Assessment/Plan:    Active Hospital Problems    Diagnosis     Elevated d-dimer [R79.89]     Elevated ferritin level [R79.89]     Pulmonary infiltrates [R91.8]     Current smoker [F17.200]     Marijuana use [F12.90]     Elevated C-reactive protein (CRP) [R79.82]     Overweight (BMI 25.0-29. 9) [E66.3]     Chronic prescription opiate use [Z79.891]     Hyperglycemia [R73.9]     Acute hypoxemic respiratory failure (HCC) [J96.01]     COPD (chronic obstructive pulmonary disease) (HCC) [J44.9]     Tachycardia [R00.0]     Tachypnea [R06.82]     Elevated lactic acid level [R79.89]        Acute hypoxic respiratory failure, POA- likely due to COVID pneumonia  - as evidenced by respiratory distress, use of accessory muscles and O2 saturation of less than 90% on room air on presentation, PCR positive  -Droplet plus isolation  - supplemental oxygen as necessary to keep SaO2 > 90%, not on O2 at home  - Dexamethasone was started. Switched to iv solumedrol 1/19  -baricitnib started 1/19  -pulm consulted, recommending continued oxygen supplementation with titration parameters, if patient continues to require more oxygen to switch to Vapotherm.  - Rapid Influenza A&B negative  - Blood cultures x 2 remain negative  - Respiratory culture negative  - Legionella pneumophilia and Strep pneumoniae urine antigens ordered and neg  - Procalcitonin 0.09 (not elevated)  - Incentive spirometry ordered  - Low intensity Enoxaparin has been started  - Will provide high calorie, high protein dietary supplements  - 25 hydroxy Vitamin D lab ordered, ordered vit d while inpt  - Zinc sulfate 50 mg bid ordered  - Thiamine 200 mg po daily ordered  - Pepcid 20 mg po bid    Cough  -provided antitussive medications as needed     Non-Intractable vomiting with nausea   -given symptomatic treatment with Zofran and/or Phenergan as needed, maintenance of fluids and electrolytes  -No complaints of N/V today      Poor appetite   -improved, patient encouraged to eat a balanced diet, including protein-rich foods.   -provided high calorie, high protein dietary supplements     Generalized weakness/fatigue   -likely from covid, improving   -pt/ot ordered 1/23     Sepsis ([facility] [address] due to Pneumonia with tachycardia, tachypnea. Initial Lactic Acid was elevated. - Pt will not be resuscitated with the full 30 cc/Kg IV Fluids per sepsis protocol as his BP is adequate and he is a high risk of fluid overload and catastrophic decompensation if he were to receive such a bolus. His blood pressure will be monitored closely  -Lactate WNL  - Blood cultures x 2 negative      COPD (chronic obstructive pulmonary disease) (HCC) -without acute exacerbation.    -Will monitor and provide breathing treatments as indicated    DVT Prophylaxis: Lovenox   Diet: ADULT ORAL NUTRITION SUPPLEMENT; Lunch, Dinner; Standard High Calorie/High Protein Oral Supplement  ADULT DIET; Regular  Code Status: Full Code    PT/OT Eval Status:  Following, recommending 24 hour supervision     Dispo - Likely home in 2-3 days pending respiratory status     Anastasia Sultana

## 2022-01-27 LAB
ALBUMIN SERPL-MCNC: 3.3 G/DL (ref 3.4–5)
ANION GAP SERPL CALCULATED.3IONS-SCNC: 8 MMOL/L (ref 3–16)
BASOPHILS ABSOLUTE: 0 K/UL (ref 0–0.2)
BASOPHILS RELATIVE PERCENT: 0.2 %
BUN BLDV-MCNC: 29 MG/DL (ref 7–20)
C-REACTIVE PROTEIN: <3 MG/L (ref 0–5.1)
CALCIUM SERPL-MCNC: 8.6 MG/DL (ref 8.3–10.6)
CHLORIDE BLD-SCNC: 100 MMOL/L (ref 99–110)
CO2: 28 MMOL/L (ref 21–32)
CREAT SERPL-MCNC: 0.6 MG/DL (ref 0.8–1.3)
EOSINOPHILS ABSOLUTE: 0 K/UL (ref 0–0.6)
EOSINOPHILS RELATIVE PERCENT: 0.3 %
GFR AFRICAN AMERICAN: >60
GFR NON-AFRICAN AMERICAN: >60
GLUCOSE BLD-MCNC: 105 MG/DL (ref 70–99)
HCT VFR BLD CALC: 40.7 % (ref 40.5–52.5)
HEMOGLOBIN: 13.4 G/DL (ref 13.5–17.5)
LYMPHOCYTES ABSOLUTE: 1.1 K/UL (ref 1–5.1)
LYMPHOCYTES RELATIVE PERCENT: 14.8 %
MCH RBC QN AUTO: 29.6 PG (ref 26–34)
MCHC RBC AUTO-ENTMCNC: 32.9 G/DL (ref 31–36)
MCV RBC AUTO: 90.1 FL (ref 80–100)
MONOCYTES ABSOLUTE: 0.6 K/UL (ref 0–1.3)
MONOCYTES RELATIVE PERCENT: 7.8 %
NEUTROPHILS ABSOLUTE: 5.9 K/UL (ref 1.7–7.7)
NEUTROPHILS RELATIVE PERCENT: 76.9 %
PDW BLD-RTO: 13.6 % (ref 12.4–15.4)
PHOSPHORUS: 3.2 MG/DL (ref 2.5–4.9)
PLATELET # BLD: 251 K/UL (ref 135–450)
PMV BLD AUTO: 7.7 FL (ref 5–10.5)
POTASSIUM SERPL-SCNC: 4.2 MMOL/L (ref 3.5–5.1)
RBC # BLD: 4.52 M/UL (ref 4.2–5.9)
SODIUM BLD-SCNC: 136 MMOL/L (ref 136–145)
WBC # BLD: 7.7 K/UL (ref 4–11)

## 2022-01-27 PROCEDURE — 6370000000 HC RX 637 (ALT 250 FOR IP): Performed by: INTERNAL MEDICINE

## 2022-01-27 PROCEDURE — 6360000002 HC RX W HCPCS: Performed by: INTERNAL MEDICINE

## 2022-01-27 PROCEDURE — 80069 RENAL FUNCTION PANEL: CPT

## 2022-01-27 PROCEDURE — 94761 N-INVAS EAR/PLS OXIMETRY MLT: CPT

## 2022-01-27 PROCEDURE — 86140 C-REACTIVE PROTEIN: CPT

## 2022-01-27 PROCEDURE — 94640 AIRWAY INHALATION TREATMENT: CPT

## 2022-01-27 PROCEDURE — 97530 THERAPEUTIC ACTIVITIES: CPT

## 2022-01-27 PROCEDURE — 1200000000 HC SEMI PRIVATE

## 2022-01-27 PROCEDURE — 36415 COLL VENOUS BLD VENIPUNCTURE: CPT

## 2022-01-27 PROCEDURE — 85025 COMPLETE CBC W/AUTO DIFF WBC: CPT

## 2022-01-27 PROCEDURE — 2700000000 HC OXYGEN THERAPY PER DAY

## 2022-01-27 PROCEDURE — 2580000003 HC RX 258: Performed by: INTERNAL MEDICINE

## 2022-01-27 PROCEDURE — 97110 THERAPEUTIC EXERCISES: CPT

## 2022-01-27 RX ORDER — ZINC SULFATE 50(220)MG
CAPSULE ORAL
Status: DISPENSED
Start: 2022-01-27 | End: 2022-01-28

## 2022-01-27 RX ORDER — ZINC SULFATE 50(220)MG
50 CAPSULE ORAL EVERY EVENING
Status: DISCONTINUED | OUTPATIENT
Start: 2022-01-28 | End: 2022-01-27 | Stop reason: SDUPTHER

## 2022-01-27 RX ADMIN — Medication 2 PUFF: at 20:18

## 2022-01-27 RX ADMIN — Medication 1000 UNITS: at 21:13

## 2022-01-27 RX ADMIN — ASPIRIN 81 MG: 81 TABLET, COATED ORAL at 08:57

## 2022-01-27 RX ADMIN — OXYCODONE HYDROCHLORIDE 15 MG: 5 TABLET ORAL at 09:10

## 2022-01-27 RX ADMIN — Medication 3 MG: at 21:12

## 2022-01-27 RX ADMIN — FAMOTIDINE 20 MG: 20 TABLET ORAL at 21:13

## 2022-01-27 RX ADMIN — SODIUM CHLORIDE, PRESERVATIVE FREE 10 ML: 5 INJECTION INTRAVENOUS at 21:18

## 2022-01-27 RX ADMIN — OXYCODONE HYDROCHLORIDE 15 MG: 5 TABLET ORAL at 13:10

## 2022-01-27 RX ADMIN — BARICITINIB 4 MG: 2 TABLET, FILM COATED ORAL at 08:57

## 2022-01-27 RX ADMIN — Medication 2 PUFF: at 08:03

## 2022-01-27 RX ADMIN — FAMOTIDINE 20 MG: 20 TABLET ORAL at 08:57

## 2022-01-27 RX ADMIN — SODIUM CHLORIDE, PRESERVATIVE FREE 10 ML: 5 INJECTION INTRAVENOUS at 08:57

## 2022-01-27 RX ADMIN — OXYCODONE HYDROCHLORIDE AND ACETAMINOPHEN 500 MG: 500 TABLET ORAL at 08:56

## 2022-01-27 RX ADMIN — OXYCODONE HYDROCHLORIDE 15 MG: 5 TABLET ORAL at 21:14

## 2022-01-27 RX ADMIN — METHYLPREDNISOLONE SODIUM SUCCINATE 40 MG: 40 INJECTION, POWDER, LYOPHILIZED, FOR SOLUTION INTRAMUSCULAR; INTRAVENOUS at 17:21

## 2022-01-27 RX ADMIN — TRAZODONE HYDROCHLORIDE 25 MG: 50 TABLET ORAL at 21:13

## 2022-01-27 RX ADMIN — GUAIFENESIN 600 MG: 600 TABLET, EXTENDED RELEASE ORAL at 08:57

## 2022-01-27 RX ADMIN — OXYCODONE HYDROCHLORIDE 15 MG: 5 TABLET ORAL at 05:14

## 2022-01-27 RX ADMIN — OXYCODONE HYDROCHLORIDE 15 MG: 5 TABLET ORAL at 17:20

## 2022-01-27 RX ADMIN — GUAIFENESIN 600 MG: 600 TABLET, EXTENDED RELEASE ORAL at 21:12

## 2022-01-27 RX ADMIN — ENOXAPARIN SODIUM 90 MG: 100 INJECTION SUBCUTANEOUS at 08:57

## 2022-01-27 RX ADMIN — OXYCODONE HYDROCHLORIDE 15 MG: 5 TABLET ORAL at 01:10

## 2022-01-27 RX ADMIN — ENOXAPARIN SODIUM 90 MG: 100 INJECTION SUBCUTANEOUS at 21:14

## 2022-01-27 RX ADMIN — METHYLPREDNISOLONE SODIUM SUCCINATE 40 MG: 40 INJECTION, POWDER, LYOPHILIZED, FOR SOLUTION INTRAMUSCULAR; INTRAVENOUS at 05:14

## 2022-01-27 RX ADMIN — ZINC SULFATE 220 MG (50 MG) CAPSULE 50 MG: CAPSULE at 17:21

## 2022-01-27 RX ADMIN — Medication 200 MG: at 08:57

## 2022-01-27 ASSESSMENT — PAIN SCALES - GENERAL
PAINLEVEL_OUTOF10: 7
PAINLEVEL_OUTOF10: 5
PAINLEVEL_OUTOF10: 0
PAINLEVEL_OUTOF10: 7
PAINLEVEL_OUTOF10: 7
PAINLEVEL_OUTOF10: 3
PAINLEVEL_OUTOF10: 7
PAINLEVEL_OUTOF10: 8
PAINLEVEL_OUTOF10: 7
PAINLEVEL_OUTOF10: 7

## 2022-01-27 NOTE — PROGRESS NOTES
Physical Therapy  Facility/Department: [facility] C5 - MED SURG/ORTHO  Daily Treatment Note  NAME: Carroll Holcomb  : 1955  MRN: [id number]    Date of Service: 2022    Discharge Recommendations:  24 hour supervision or assist,Home with Home health PT   PT Equipment Recommendations  Equipment Needed: No  If pt is unable to be seen after this session, please let this note serve as discharge summary. Please see case management note for discharge disposition. Thank you. Assessment   Body structures, Functions, Activity limitations: Decreased functional mobility ; Decreased ADL status; Decreased strength;Decreased endurance;Decreased balance  Assessment: Pt demonstrating improved tolerance to activity on 11L O2 but does drop quickly with short ambulation distances. Pt able to recover to >90% in ~1 minute with sitting rest. Returned to bed at EOS. Treatment Diagnosis: decreased independence with functional mobility. Specific instructions for Next Treatment: progress mobility as tolerated  Prognosis: Good  Decision Making: Medium Complexity  PT Education: Goals; General Safety;Gait Training;Disease Specific Education;PT Role;Plan of Care; Functional Mobility Training;Pressure Relief; Injury Prevention;Home Exercise Program;Precautions; Energy Conservation;Transfer Training  Patient Education: Patient verbalized understanding  Barriers to Learning: none  REQUIRES PT FOLLOW UP: Yes  Activity Tolerance  Activity Tolerance: Patient Tolerated treatment well  Activity Tolerance: 125/81, 81 HR, O2 range from 83-95% on 11L O2     Patient Diagnosis(es): The encounter diagnosis was Acute hypoxemic respiratory failure due to COVID-19 [facility]). has a past medical history of COPD (chronic obstructive pulmonary disease) ([facility] [address] has no past surgical history on file.     Restrictions  Restrictions/Precautions  Restrictions/Precautions: Contact Precautions,Fall Risk,Isolation,Up as Tolerated  Position Activity Restriction  Other tolerate 12-15 reps of his exercises to maximize his strength/endurance   -1/26 on-going  Patient Goals   Patient goals : To improve his endurance. To go home.     Plan    Plan  Times per week: 2-3/week  Plan weeks: 1 week 1/30/22  Specific instructions for Next Treatment: progress mobility as tolerated  Current Treatment Recommendations: Strengthening,Safety Education & Training,Home Exercise Program,Balance Training,Endurance Training,Patient/Caregiver Education & Training,Functional Mobility Training,Equipment Evaluation, Education, & procurement,Transfer Training,Gait Training,Positioning,ADL/Self-care Training  Safety Devices  Type of devices: Call light within reach,Gait belt,Left in bed,Nurse notified  Restraints  Initially in place: No     Therapy Time   Individual Concurrent Group Co-treatment   Time In [facility] 30         Time Out 1015         Minutes Liban Villa, PT

## 2022-01-27 NOTE — PROGRESS NOTES
Occupational Therapy  Facility/Department: [facility] C5 - MED SURG/ORTHO  Daily Treatment Note  NAME: Bhumika Ling  : 1955  MRN: [id number]    Date of Service: 2022    Discharge Recommendations:  Home with Home health OT,24 hour supervision or assist  OT Equipment Recommendations  Equipment Needed: Yes  Mobility Devices: ADL Assistive Devices  ADL Assistive Devices: Shower Chair with back    Assessment   Performance deficits / Impairments: Decreased ADL status; Decreased endurance;Decreased functional mobility   Assessment: Pt able to tolerate increased activity this date now that he is on regular nasal cannula 5L, completed bathroom mobility S level. Pt primarily limited by endurance, requiring seated rest breaks and pursed lip breathing after mobility. Pt reports I with BADLs, not observed during session, however at this time recommend shower chair with back at d/c due to pt desaturation with standing activities and decreased endurance overall. Continue to recommend home with 24 hr A at d/c. Prognosis: Good  OT Education: OT Role;Plan of Care;ADL Adaptive Strategies;Transfer Training;Equipment; Energy Conservation  Disease Specific Education: Pt educated on importance of OOB mobility, prevention of complications of bedrest, and general safety during hospitalization. Pt verbalized understanding. Barriers to Learning: none perceived  REQUIRES OT FOLLOW UP: Yes  Activity Tolerance  Activity Tolerance: Patient Tolerated treatment well  Activity Tolerance: Vitals: BP= 110/71, HR= 69, SPO2= 92% on 5L at start of session, pt SPO2 dropped to 83% with mobility, able to recover in ~2 mins with pursed lip breathing  Safety Devices  Safety Devices in place: Yes  Type of devices: Call light within reach; Left in bed;Bed alarm in place;Nurse notified         Patient Diagnosis(es): The encounter diagnosis was Acute hypoxemic respiratory failure due to COVID-19 [facility]).       has a past medical history of COPD (chronic obstructive pulmonary disease) ([facility] [address] has no past surgical history on file. Restrictions  Restrictions/Precautions  Restrictions/Precautions: Contact Precautions,Fall Risk,Isolation,Up as Tolerated  Position Activity Restriction  Other position/activity restrictions: Droplet plus isolation. Tested positive for COVID-19 on 1/18/22. 5L O2     Subjective   General  Chart Reviewed: Yes  Patient assessed for rehabilitation services?: Yes  Additional Pertinent Hx: Acute hypoxic respiratory failure  Response to previous treatment: Patient with no complaints from previous session  Family / Caregiver Present: No  Referring Practitioner: Francisco Javier Mayorga MD  Diagnosis: Covid 19    Subjective  Subjective: Pt resting in bed, agreeable to OT treatment. Pt reports he feels ready to go home. Vital Signs  Patient Currently in Pain: Denies     Orientation  Orientation  Overall Orientation Status: Within Functional Limits     Objective    ADL  Additional Comments: Pt declines need for ADLs at this time, simulated toileting and handwashing during session.      Balance  Sitting Balance: Independent  Standing Balance: Supervision  Standing Balance  Activity: functional/bathroom mobility, 5 consecutive sit <-> stands    Functional Mobility  Functional - Mobility Device: No device  Activity: To/from bathroom  Assist Level: Supervision    Toilet Transfers  Toilet - Technique: Ambulating  Equipment Used: Standard toilet  Toilet Transfer: Supervision    Bed mobility  Supine to Sit: Modified independent  Sit to Supine: Modified independent     Transfers  Sit to stand: Supervision  Stand to sit: Supervision     Cognition  Overall Cognitive Status: [facility]     Plan   Plan  Times per week: 2-3x/week  Current Treatment Recommendations: Endurance Training,Functional Mobility Training,Self-Care / ADL,Home Management Training,Equipment Evaluation, Education, & procurement,Patient/Caregiver Education & Training,Safety Education & Training    AM-PAC Score  AM-PAC Inpatient Daily Activity Raw Score: 22 (01/27/22 1445)  AM-PAC Inpatient ADL T-Scale Score : 47.1 (01/27/22 1445)  ADL Inpatient CMS 0-100% Score: 25.8 (01/27/22 1445)  ADL Inpatient CMS G-Code Modifier : CJ (01/27/22 1445)    Goals  Short term goals  Time Frame for Short term goals: 1 week (1/30) unless stated otherwise. Short term goal 1: Pt will LB dress with supervision, taking rest breaks PRN, and AD PRN. -- ongoing 1/27/22  Short term goal 2: Pt will perform at least 2min of standing functional activities. -- ongoing 1/27/22  Short term goal 3: Pt will perform BUE ther ex x20 to increase endurance for functional activities (1/28)-- ongoing 1/27/22  Short term goal 4: Pt will toilet with supervision and AD PRN. -- ongoing 1/27/22  Patient Goals   Patient goals : \"just leave here\"       Therapy Time   Individual Concurrent Group Co-treatment   Time In 1340         Time Out 1420         Minutes 1276 CONCHITA Arzola/CALLI

## 2022-01-27 NOTE — PROGRESS NOTES
Hospitalist Progress Note      PCP: No primary care provider on file. Date of Admission: 1/18/2022    Chief Complaint:   Shortness of breath     Hospital Course:   Pt is an 78y.o. year-old male with a history of COPD who presents to the emergency room for evaluation following a 2-week history of shortness of breath, and intermittent nonproductive cough and intermittent episodes of emesis.  The emesis is nonbilious nonbloody.  In the emergency room he was found to have an oxygen saturation of 83% on room air.  He had a rapid COVID test performed which was negative.  However, imaging has findings suggestive of a COVID-19 pneumonia. Jim López is being admitted for further evaluation and treatment. Associated signs and symptoms do not include fever or chills, abdominal pain, hemoptysis, hematochezia, diarrhea, constipation or urinary symptoms. Subjective:   Seen resting comfortably in bed. States his breathing continues to improve. No other complaints at this time. Appetite good. Denies chest pain, abdominal pain, N/V/D, lightheadedness or dizziness, or any focal neurologic deficit.      Medications:  Reviewed    Infusion Medications    sodium chloride 25 mL (01/19/22 5473)     Scheduled Medications    zinc sulfate        Vitamin D  1,000 Units Oral Nightly    traZODone  25 mg Oral Nightly    zinc sulfate  50 mg Oral QPM    ascorbic acid  500 mg Oral Daily    guaiFENesin  600 mg Oral BID    enoxaparin  1 mg/kg SubCUTAneous BID    baricitinib  4 mg Oral Daily    methylPREDNISolone  40 mg IntraVENous Q12H    aspirin EC  81 mg Oral Daily    budesonide-formoterol  2 puff Inhalation BID    sodium chloride flush  5-40 mL IntraVENous 2 times per day    nicotine  1 patch TransDERmal Daily    famotidine  20 mg Oral BID    melatonin  3 mg Oral Nightly    thiamine  200 mg Oral Daily     PRN Meds: oxyCODONE, sodium chloride flush, sodium chloride, polyethylene glycol, acetaminophen **OR** acetaminophen, prochlorperazine, albuterol sulfate HFA, benzonatate      Intake/Output Summary (Last 24 hours) at 1/27/2022 1809  Last data filed at 1/27/2022 1728  Gross per 24 hour   Intake 690 ml   Output 1800 ml   Net -1110 ml       Physical Exam Performed:    /64   Pulse 119   Temp 98 °F (36.7 °C) (Oral)   Resp 16   Ht 5' 10\" (1.778 m)   Wt 191 lb 5.8 oz (86.8 kg)   SpO2 92%   BMI 27.46 kg/m²     General appearance: No apparent distress, appears stated age and cooperative. HEENT: Pupils equal, round, and reactive to light. Conjunctivae/corneas clear. Neck: Supple, with full range of motion. No jugular venous distention. Trachea midline. Respiratory: On 8 L O2 via NC. Normal respiratory effort. Diminished breath sounds bilaterally. Cardiovascular: Regular rate and rhythm with normal S1/S2 without murmurs, rubs or gallops. Abdomen: Soft, non-tender, non-distended with normal bowel sounds. Musculoskeletal: No clubbing, cyanosis or edema bilaterally. Full range of motion without deformity. Skin: Skin color, texture, turgor normal.  No rashes or lesions. Neurologic:  Neurovascularly intact without any focal sensory/motor deficits. Cranial nerves: II-XII intact, grossly non-focal.  Psychiatric: Alert and oriented, thought content appropriate, normal insight  Capillary Refill: Brisk,3 seconds, normal   Peripheral Pulses: +2 palpable, equal bilaterally       Labs:   Recent Labs     01/25/22  0630 01/26/22  0532 01/27/22  0712   WBC 8.4 8.2 7.7   HGB 13.7 14.2 13.4*   HCT 41.4 44.0 40.7    281 251     Recent Labs     01/25/22  0630 01/26/22  0532 01/27/22  0712    137 136   K 4.5 4.3 4.2    101 100   CO2 24 27 28   BUN 30* 29* 29*   CREATININE <0.5* <0.5* 0.6*   CALCIUM 8.9 8.7 8.6   PHOS 3.7 3.0 3.2     No results for input(s): AST, ALT, BILIDIR, BILITOT, ALKPHOS in the last 72 hours. No results for input(s): INR in the last 72 hours.   No results for input(s): Bunny Javier in the last 72 hours. Urinalysis:    No results found for: Nelly Laundry, BACTERIA, RBCUA, BLOODU, [address], [address]    Radiology:  XR CHEST PORTABLE   Final Result   Bilateral pulmonary opacification concerning for multifocal pneumonia   including COVID-19 pneumonia. Assessment/Plan:    Active Hospital Problems    Diagnosis     Elevated d-dimer [R79.89]     Elevated ferritin level [R79.89]     Pulmonary infiltrates [R91.8]     Current smoker [F17.200]     Marijuana use [F12.90]     Elevated C-reactive protein (CRP) [R79.82]     Overweight (BMI 25.0-29. 9) [E66.3]     Chronic prescription opiate use [Z79.891]     Hyperglycemia [R73.9]     Acute hypoxemic respiratory failure (HCC) [J96.01]     COPD (chronic obstructive pulmonary disease) (HCC) [J44.9]     Tachycardia [R00.0]     Tachypnea [R06.82]     Elevated lactic acid level [R79.89]        Acute hypoxic respiratory failure, POA- likely due to COVID pneumonia  - as evidenced by respiratory distress, use of accessory muscles and O2 saturation of less than 90% on room air on presentation, PCR positive  -Droplet plus isolation  - supplemental oxygen as necessary to keep SaO2 > 90%, not on O2 at home  - Dexamethasone was started. Switched to iv solumedrol 1/19  -baricitnib started 1/19  -pulm consulted, recommending continued oxygen supplementation with titration parameters, if patient continues to require more oxygen to switch to Vapotherm.  - Rapid Influenza A&B negative  - Blood cultures x 2 remain negative  - Respiratory culture negative  - Legionella pneumophilia and Strep pneumoniae urine antigens ordered and neg  - Procalcitonin 0.09 (not elevated)  - Incentive spirometry ordered  - Low intensity Enoxaparin has been started  - Will provide high calorie, high protein dietary supplements  - 25 hydroxy Vitamin D lab ordered, ordered vit d while inpt  - Zinc sulfate 50 mg bid ordered  - Thiamine 200 mg po daily ordered  - Pepcid 20 mg po bid  -Able to wean down to 8 L this afternoon     Cough  -provided antitussive medications as needed     Non-Intractable vomiting with nausea   -given symptomatic treatment with Zofran and/or Phenergan as needed, maintenance of fluids and electrolytes  -No complaints of N/V for past few days      Poor appetite, resolved  -improved, patient encouraged to eat a balanced diet, including protein-rich foods.   -provided high calorie, high protein dietary supplements     Generalized weakness/fatigue   -likely from covid, improving   -pt/ot ordered 1/23     Sepsis ([facility] [address] due to Pneumonia with tachycardia, tachypnea. Initial Lactic Acid was elevated. - Pt will not be resuscitated with the full 30 cc/Kg IV Fluids per sepsis protocol as his BP is adequate and he is a high risk of fluid overload and catastrophic decompensation if he were to receive such a bolus. His blood pressure will be monitored closely  -Lactate WNL  - Blood cultures x 2 negative      COPD (chronic obstructive pulmonary disease) (HCC) -without acute exacerbation.    -Will monitor and provide breathing treatments as indicated    DVT Prophylaxis: Lovenox   Diet: ADULT ORAL NUTRITION SUPPLEMENT; Lunch, Dinner; Standard High Calorie/High Protein Oral Supplement  ADULT DIET; Regular  Code Status: Full Code    PT/OT Eval Status:  Following, recommending 24 hour supervision     Dispo - Likely tomorrow     Poly Chatman, [address]

## 2022-01-28 LAB
ALBUMIN SERPL-MCNC: 3.3 G/DL (ref 3.4–5)
ANION GAP SERPL CALCULATED.3IONS-SCNC: 13 MMOL/L (ref 3–16)
BASOPHILS ABSOLUTE: 0 K/UL (ref 0–0.2)
BASOPHILS RELATIVE PERCENT: 0.1 %
BUN BLDV-MCNC: 24 MG/DL (ref 7–20)
C-REACTIVE PROTEIN: <3 MG/L (ref 0–5.1)
CALCIUM SERPL-MCNC: 8.9 MG/DL (ref 8.3–10.6)
CHLORIDE BLD-SCNC: 100 MMOL/L (ref 99–110)
CO2: 23 MMOL/L (ref 21–32)
CREAT SERPL-MCNC: <0.5 MG/DL (ref 0.8–1.3)
EOSINOPHILS ABSOLUTE: 0 K/UL (ref 0–0.6)
EOSINOPHILS RELATIVE PERCENT: 0 %
GFR AFRICAN AMERICAN: >60
GFR NON-AFRICAN AMERICAN: >60
GLUCOSE BLD-MCNC: 122 MG/DL (ref 70–99)
HCT VFR BLD CALC: 40.6 % (ref 40.5–52.5)
HEMOGLOBIN: 13.2 G/DL (ref 13.5–17.5)
LYMPHOCYTES ABSOLUTE: 1.3 K/UL (ref 1–5.1)
LYMPHOCYTES RELATIVE PERCENT: 10.4 %
MCH RBC QN AUTO: 29.7 PG (ref 26–34)
MCHC RBC AUTO-ENTMCNC: 32.5 G/DL (ref 31–36)
MCV RBC AUTO: 91.3 FL (ref 80–100)
MONOCYTES ABSOLUTE: 1 K/UL (ref 0–1.3)
MONOCYTES RELATIVE PERCENT: 7.7 %
NEUTROPHILS ABSOLUTE: 10.3 K/UL (ref 1.7–7.7)
NEUTROPHILS RELATIVE PERCENT: 81.8 %
PDW BLD-RTO: 13.6 % (ref 12.4–15.4)
PHOSPHORUS: 3.2 MG/DL (ref 2.5–4.9)
PLATELET # BLD: 271 K/UL (ref 135–450)
PMV BLD AUTO: 7.3 FL (ref 5–10.5)
POTASSIUM SERPL-SCNC: 4.1 MMOL/L (ref 3.5–5.1)
PROCALCITONIN: 0.05 NG/ML (ref 0–0.15)
RBC # BLD: 4.45 M/UL (ref 4.2–5.9)
SODIUM BLD-SCNC: 136 MMOL/L (ref 136–145)
WBC # BLD: 12.6 K/UL (ref 4–11)

## 2022-01-28 PROCEDURE — 85025 COMPLETE CBC W/AUTO DIFF WBC: CPT

## 2022-01-28 PROCEDURE — 1200000000 HC SEMI PRIVATE

## 2022-01-28 PROCEDURE — 94640 AIRWAY INHALATION TREATMENT: CPT

## 2022-01-28 PROCEDURE — 80069 RENAL FUNCTION PANEL: CPT

## 2022-01-28 PROCEDURE — 86140 C-REACTIVE PROTEIN: CPT

## 2022-01-28 PROCEDURE — 6360000002 HC RX W HCPCS: Performed by: INTERNAL MEDICINE

## 2022-01-28 PROCEDURE — 6370000000 HC RX 637 (ALT 250 FOR IP): Performed by: INTERNAL MEDICINE

## 2022-01-28 PROCEDURE — 2700000000 HC OXYGEN THERAPY PER DAY

## 2022-01-28 PROCEDURE — 2580000003 HC RX 258: Performed by: INTERNAL MEDICINE

## 2022-01-28 PROCEDURE — 84145 PROCALCITONIN (PCT): CPT

## 2022-01-28 PROCEDURE — 36415 COLL VENOUS BLD VENIPUNCTURE: CPT

## 2022-01-28 PROCEDURE — 94761 N-INVAS EAR/PLS OXIMETRY MLT: CPT

## 2022-01-28 RX ORDER — GUAIFENESIN 600 MG/1
600 TABLET, EXTENDED RELEASE ORAL 2 TIMES DAILY
Qty: 28 TABLET | Refills: 0 | Status: SHIPPED | OUTPATIENT
Start: 2022-01-28 | End: 2022-02-11

## 2022-01-28 RX ADMIN — OXYCODONE HYDROCHLORIDE 15 MG: 5 TABLET ORAL at 23:23

## 2022-01-28 RX ADMIN — OXYCODONE HYDROCHLORIDE 15 MG: 5 TABLET ORAL at 18:54

## 2022-01-28 RX ADMIN — BARICITINIB 4 MG: 2 TABLET, FILM COATED ORAL at 10:13

## 2022-01-28 RX ADMIN — Medication 3 MG: at 20:03

## 2022-01-28 RX ADMIN — ENOXAPARIN SODIUM 90 MG: 100 INJECTION SUBCUTANEOUS at 10:12

## 2022-01-28 RX ADMIN — OXYCODONE HYDROCHLORIDE AND ACETAMINOPHEN 500 MG: 500 TABLET ORAL at 10:12

## 2022-01-28 RX ADMIN — OXYCODONE HYDROCHLORIDE 15 MG: 5 TABLET ORAL at 01:44

## 2022-01-28 RX ADMIN — GUAIFENESIN 600 MG: 600 TABLET, EXTENDED RELEASE ORAL at 20:03

## 2022-01-28 RX ADMIN — ENOXAPARIN SODIUM 90 MG: 100 INJECTION SUBCUTANEOUS at 20:03

## 2022-01-28 RX ADMIN — METHYLPREDNISOLONE SODIUM SUCCINATE 40 MG: 40 INJECTION, POWDER, LYOPHILIZED, FOR SOLUTION INTRAMUSCULAR; INTRAVENOUS at 05:57

## 2022-01-28 RX ADMIN — ASPIRIN 81 MG: 81 TABLET, COATED ORAL at 10:12

## 2022-01-28 RX ADMIN — OXYCODONE HYDROCHLORIDE 15 MG: 5 TABLET ORAL at 05:57

## 2022-01-28 RX ADMIN — TRAZODONE HYDROCHLORIDE 25 MG: 50 TABLET ORAL at 20:03

## 2022-01-28 RX ADMIN — ZINC SULFATE 220 MG (50 MG) CAPSULE 50 MG: CAPSULE at 17:16

## 2022-01-28 RX ADMIN — FAMOTIDINE 20 MG: 20 TABLET ORAL at 10:12

## 2022-01-28 RX ADMIN — Medication 2 PUFF: at 08:36

## 2022-01-28 RX ADMIN — OXYCODONE HYDROCHLORIDE 15 MG: 5 TABLET ORAL at 10:12

## 2022-01-28 RX ADMIN — GUAIFENESIN 600 MG: 600 TABLET, EXTENDED RELEASE ORAL at 10:11

## 2022-01-28 RX ADMIN — SODIUM CHLORIDE, PRESERVATIVE FREE 10 ML: 5 INJECTION INTRAVENOUS at 10:13

## 2022-01-28 RX ADMIN — Medication 1000 UNITS: at 20:03

## 2022-01-28 RX ADMIN — SODIUM CHLORIDE, PRESERVATIVE FREE 10 ML: 5 INJECTION INTRAVENOUS at 23:24

## 2022-01-28 RX ADMIN — Medication 2 PUFF: at 19:26

## 2022-01-28 RX ADMIN — FAMOTIDINE 20 MG: 20 TABLET ORAL at 20:03

## 2022-01-28 RX ADMIN — Medication 200 MG: at 10:12

## 2022-01-28 RX ADMIN — OXYCODONE HYDROCHLORIDE 15 MG: 5 TABLET ORAL at 14:46

## 2022-01-28 ASSESSMENT — PAIN SCALES - GENERAL
PAINLEVEL_OUTOF10: 6
PAINLEVEL_OUTOF10: 7
PAINLEVEL_OUTOF10: 0

## 2022-01-28 ASSESSMENT — PAIN DESCRIPTION - FREQUENCY: FREQUENCY: CONTINUOUS

## 2022-01-28 ASSESSMENT — PAIN DESCRIPTION - LOCATION: LOCATION: GENERALIZED

## 2022-01-28 ASSESSMENT — PAIN DESCRIPTION - DESCRIPTORS: DESCRIPTORS: ACHING;DISCOMFORT

## 2022-01-28 ASSESSMENT — PAIN DESCRIPTION - PAIN TYPE: TYPE: CHRONIC PAIN

## 2022-01-28 NOTE — PROGRESS NOTES
Oxygen documentation:    O2 saturation at REST on ROOM AIR = 87%    If saturation is 89% or above please proceed with steps 2 and 3. .........     O2 saturation with AMBULATION of _____ feet on ROOM AIR = _____%  O2 saturation with AMBULATION on _______ liter/min = ______%    DCP notified: Elizabeth Smith. Is This A New Presentation, Or A Follow-Up?: Skin Lesion Has Your Skin Lesion Been Treated?: not been treated

## 2022-01-28 NOTE — DISCHARGE INSTR - COC
Continuity of Care Form    Patient Name: Thalia Ortiz   :  1955  MRN:  [id number]    Admit date:  2022  Discharge date:  ***    Code Status Order: Full Code   Advance Directives:      Admitting Physician:  No admitting provider for patient encounter. PCP: No primary care provider on file. Discharging Nurse: [facility] Unit/Room#: 0530/0530-01  Discharging Unit Phone Number: ***    Emergency Contact:   Extended Emergency Contact Information  Primary Emergency Contact: Samantha Fuentes Phone: [phone]  Relation: Child  Secondary Emergency Contact: Stanislaw Phone: [phone]  Relation: Other    Past Surgical History:  History reviewed. No pertinent surgical history. Immunization History: There is no immunization history on file for this patient. Active Problems:  Patient Active Problem List   Diagnosis Code    Acute hypoxemic respiratory failure ([facility]) J96.01    Pneumonia J18.9    COPD (chronic obstructive pulmonary disease) ([facility]) J44.9    Sepsis ([facility]) A41.9    Tachycardia R00.0    Tachypnea R06.82    Elevated lactic acid level R79.89    Pulmonary infiltrates R91.8    Current smoker F17.200    Marijuana use F12.90    Elevated C-reactive protein (CRP) R79.82    Overweight (BMI 25.0-29. 9) E66.3    Chronic prescription opiate use Z79.891    Hyperglycemia R73.9    Elevated d-dimer R79.89    Elevated ferritin level R79.89       Isolation/Infection:   Isolation            Droplet Plus          Patient Infection Status       Infection Onset Added Last Indicated Last Indicated By Review Planned Expiration Resolved Resolved By    COVID-19 22 COVID-19 22      Resolved    COVID-19 (Rule Out) 22 COVID-19 (Ordered)   22 Rule-Out Test Resulted    COVID-19 (Rule Out) 22 COVID-19, Rapid (Ordered)   22 Rule-Out Test Resulted            Nurse Assessment:  Last Vital Signs: BP 120/74   Pulse 74   Temp 97.5 °F (36.4 °C) (Oral)   Resp 18   Ht 5' 10\" (1.778 m)   Wt 191 lb 5.8 oz (86.8 kg)   SpO2 93%   BMI 27.46 kg/m²     Last documented pain score (0-10 scale): Pain Level: 7  Last Weight:   Wt Readings from Last 1 Encounters:   01/22/22 191 lb 5.8 oz (86.8 kg)     Mental Status:  {IP PT MENTAL STATUS:20030}    IV Access:  { DENNIS IV ACCESS:789464231}    Nursing Mobility/ADLs:  Walking   {CHP DME SJZM:764459733}  Transfer  {CHP DME FZDQ:562335969}  Bathing  {CHP DME QAOF:910194400}  Dressing  {CHP DME THNE:506704848}  Toileting  {CHP DME XLIZ:814863635}  Feeding  {CHP DME WFAX:618893928}  Med Admin  {CHP DME AKGQ:136832303}  Med Delivery   { DENNIS MED Delivery:388768714}    Wound Care Documentation and Therapy:        Elimination:  Continence: Bowel: {YES / FV:66326}  Bladder: {YES / CS:83557}  Urinary Catheter: {Urinary Catheter:132182863}   Colostomy/Ileostomy/Ileal Conduit: {YES / CP:90503}       Date of Last BM: ***    Intake/Output Summary (Last 24 hours) at 1/28/2022 1155  Last data filed at 1/28/2022 0811  Gross per 24 hour   Intake 450 ml   Output 1475 ml   Net -1025 ml     I/O last 3 completed shifts:   In: 80 [P.O.:690]  Out: 1900 [Urine:1900]    Safety Concerns:     508 enrich-in Safety Concerns:216816808}    Impairments/Disabilities:      [address] enrich-[address] Impairments/Disabilities:874635483}    Nutrition Therapy:  Current Nutrition Therapy:   [address] enrich-in Diet List:941763226}    Routes of Feeding: {CHP DME Other Feedings:143965142}  Liquids: {Slp liquid thickness:18785}  Daily Fluid Restriction: {CHP DME Yes amt example:686573659}  Last Modified Barium Swallow with Video (Video Swallowing Test): {Done Not Done WTDP:888709054}    Treatments at the Time of Hospital Discharge:   Respiratory Treatments: ***  Oxygen Therapy:  {Therapy; copd oxygen:51417}  Ventilator:    {MANINDER ORTEGA Vent [facility]:668849554}    Rehab Therapies: SN/PT/OT/HHA  Weight Bearing Status/Restrictions: {MANINDER ORTEGA Weight Bearin}  Other Medical Equipment (for information only, NOT a DME order):  {EQUIPMENT:868794193}  Other Treatments: [facility]: LEVEL 3 [address] to establish plan of care for patient over 60 day period   Nursing  Initial home SN evaluation visit to occur within 24-48 hours for:  1)  medication management  2)  VS and clinical assessment  3)  S&S chronic disease exacerbation education + when to contact MD/NP  4)  care coordination  Medication Reconciliation during 1st SN visit  PT/OT/Speech   Evaluations in home within 24-48 hours of discharge to include DME and home safety   Frontload therapy 5 days, then 3x a week   OT to evaluate if patient has [address] needs for personal care    evaluation within 24-48 hours to evaluate resources & insurance for potential AL, [place], LTC, and Medicaid options   Palliative Care referral within 5 days of hospital discharge   PCP Visit scheduled within 3 - 7 days of hospital discharge    [place] care Vitals (If patient is agreeable and meets guidelines)      Patient's personal belongings (please select all that are sent with patient):  {CHP DME Belongings:627147078}    RN SIGNATURE:  {Esignature:617225773}    CASE MANAGEMENT/SOCIAL WORK SECTION    Inpatient Status Date: ***    Readmission Risk Assessment Score:  Readmission Risk              Risk of Unplanned Readmission:  26           Discharging to Facility/ Agency   Name:  [facility] care    Address: [address] [place] [address], [place] [address]  Phone: [phone]  Fax: [phone]      Dialysis Facility (if applicable)   Name:  Address:  Dialysis Schedule:  Phone:  Fax:    / signature: {Esignature:664334277}    PHYSICIAN SECTION    Prognosis: Good    Condition at Discharge: Stable    Rehab Potential (if transferring to Rehab): Good    Recommended Labs or Other Treatments After Discharge:    Follow-up with PCP in 1-2 weeks.  [facility]    Physician Certification: I certify the above information and transfer of Chuck Nyhan  is necessary for the continuing treatment of the diagnosis listed and that he requires {Admit to Appropriate Level of Care:33084} for {GREATER/LESS:829800226} 30 days.      Update Admission H&P: {CHP DME Changes in VCZQQ:709095591}    PHYSICIAN SIGNATURE:  {Esignature:721170920}

## 2022-01-28 NOTE — CARE COORDINATION
1/28/22 Pt's PCP is Dr. Teodoro Naik with the [facility]. I called  office and left a VM , that pt will need HHC and home oxygen, waiting for return lucy call.

## 2022-01-28 NOTE — CARE COORDINATION
1/28/22 Home care orders and home oxygen orders faxed to the [place], waiting to hear back from them, will follow.

## 2022-01-28 NOTE — CARE COORDINATION
[facility]    DC order noted, all docs needed have been faxed to [facility] for home care services.     Home care to see patient pending Blas Mcdonough RN, BSN CTN  [facility] [phone]

## 2022-01-28 NOTE — CARE COORDINATION
1/28/22 Received a call from [facility] office that they approved and set up [facility] and [facility] o2. [facility] this weekend to arrange pt's first home care visit. [facility] will supply pt's home o2. They will arrange the delivery of the concentrator with pt's son Luis Sorto and will deliver a tank here to the hospital this evening. [facility] [phone] ext [phone].

## 2022-01-28 NOTE — CARE COORDINATION
[facility]    Referral received from CM to follow for home care services. I will follow for needs, and speak with patient to verify demos.   Pending auth from [place]    PCP is Dr. Nidhi Adamson with the \[facility]\""     Order/Referral needs sent to [place] and I will follow up on auth request  CM to call [phone] ask for triage nurse  Request order for home care from [facility] pcp  Fax: order; facesheet; h&p; avs; dc summary to triage nurse/va pcp  Let them know patient requesting [facility] for home care    Written auth needs faxed to [facility] [phone]     [facility] will call [place] home care coordinator to follow up on auth request  [phone] Ward Saini     For additional assistance can reach out to [address] [facility] [address] Supervisor Yvette Candelario [phone]

## 2022-01-29 VITALS
HEIGHT: 70 IN | SYSTOLIC BLOOD PRESSURE: 110 MMHG | DIASTOLIC BLOOD PRESSURE: 71 MMHG | BODY MASS INDEX: 27.4 KG/M2 | WEIGHT: 191.36 LBS | HEART RATE: 68 BPM | OXYGEN SATURATION: 90 % | TEMPERATURE: 97.8 F | RESPIRATION RATE: 18 BRPM

## 2022-01-29 LAB
ALBUMIN SERPL-MCNC: 2.9 G/DL (ref 3.4–5)
ANION GAP SERPL CALCULATED.3IONS-SCNC: 9 MMOL/L (ref 3–16)
ATYPICAL LYMPHOCYTE RELATIVE PERCENT: 1 % (ref 0–6)
BANDED NEUTROPHILS RELATIVE PERCENT: 1 % (ref 0–7)
BASOPHILS ABSOLUTE: 0 K/UL (ref 0–0.2)
BASOPHILS RELATIVE PERCENT: 0 %
BUN BLDV-MCNC: 30 MG/DL (ref 7–20)
C-REACTIVE PROTEIN: <3 MG/L (ref 0–5.1)
CALCIUM SERPL-MCNC: 8.3 MG/DL (ref 8.3–10.6)
CHLORIDE BLD-SCNC: 100 MMOL/L (ref 99–110)
CO2: 26 MMOL/L (ref 21–32)
CREAT SERPL-MCNC: 0.6 MG/DL (ref 0.8–1.3)
EOSINOPHILS ABSOLUTE: 0 K/UL (ref 0–0.6)
EOSINOPHILS RELATIVE PERCENT: 0 %
GFR AFRICAN AMERICAN: >60
GFR NON-AFRICAN AMERICAN: >60
GLUCOSE BLD-MCNC: 85 MG/DL (ref 70–99)
HCT VFR BLD CALC: 40.4 % (ref 40.5–52.5)
HEMOGLOBIN: 13.4 G/DL (ref 13.5–17.5)
LYMPHOCYTES ABSOLUTE: 2.4 K/UL (ref 1–5.1)
LYMPHOCYTES RELATIVE PERCENT: 20 %
MCH RBC QN AUTO: 29.7 PG (ref 26–34)
MCHC RBC AUTO-ENTMCNC: 33.1 G/DL (ref 31–36)
MCV RBC AUTO: 89.8 FL (ref 80–100)
METAMYELOCYTES RELATIVE PERCENT: 1 %
MONOCYTES ABSOLUTE: 0.5 K/UL (ref 0–1.3)
MONOCYTES RELATIVE PERCENT: 4 %
MYELOCYTE PERCENT: 1 %
NEUTROPHILS ABSOLUTE: 8.7 K/UL (ref 1.7–7.7)
NEUTROPHILS RELATIVE PERCENT: 72 %
PDW BLD-RTO: 13.9 % (ref 12.4–15.4)
PHOSPHORUS: 3.5 MG/DL (ref 2.5–4.9)
PLATELET # BLD: 228 K/UL (ref 135–450)
PMV BLD AUTO: 7.3 FL (ref 5–10.5)
POTASSIUM SERPL-SCNC: 4 MMOL/L (ref 3.5–5.1)
RBC # BLD: 4.5 M/UL (ref 4.2–5.9)
SODIUM BLD-SCNC: 135 MMOL/L (ref 136–145)
WBC # BLD: 11.6 K/UL (ref 4–11)

## 2022-01-29 PROCEDURE — 6360000002 HC RX W HCPCS: Performed by: INTERNAL MEDICINE

## 2022-01-29 PROCEDURE — 94761 N-INVAS EAR/PLS OXIMETRY MLT: CPT

## 2022-01-29 PROCEDURE — 94640 AIRWAY INHALATION TREATMENT: CPT

## 2022-01-29 PROCEDURE — 85025 COMPLETE CBC W/AUTO DIFF WBC: CPT

## 2022-01-29 PROCEDURE — 2700000000 HC OXYGEN THERAPY PER DAY

## 2022-01-29 PROCEDURE — 6370000000 HC RX 637 (ALT 250 FOR IP): Performed by: INTERNAL MEDICINE

## 2022-01-29 PROCEDURE — 86140 C-REACTIVE PROTEIN: CPT

## 2022-01-29 PROCEDURE — 2580000003 HC RX 258: Performed by: INTERNAL MEDICINE

## 2022-01-29 PROCEDURE — 36415 COLL VENOUS BLD VENIPUNCTURE: CPT

## 2022-01-29 PROCEDURE — 80069 RENAL FUNCTION PANEL: CPT

## 2022-01-29 RX ADMIN — Medication 2 PUFF: at 08:09

## 2022-01-29 RX ADMIN — GUAIFENESIN 600 MG: 600 TABLET, EXTENDED RELEASE ORAL at 09:45

## 2022-01-29 RX ADMIN — OXYCODONE HYDROCHLORIDE 15 MG: 5 TABLET ORAL at 07:31

## 2022-01-29 RX ADMIN — ASPIRIN 81 MG: 81 TABLET, COATED ORAL at 09:46

## 2022-01-29 RX ADMIN — BARICITINIB 4 MG: 2 TABLET, FILM COATED ORAL at 09:52

## 2022-01-29 RX ADMIN — OXYCODONE HYDROCHLORIDE AND ACETAMINOPHEN 500 MG: 500 TABLET ORAL at 09:45

## 2022-01-29 RX ADMIN — SODIUM CHLORIDE, PRESERVATIVE FREE 10 ML: 5 INJECTION INTRAVENOUS at 09:46

## 2022-01-29 RX ADMIN — Medication 200 MG: at 09:45

## 2022-01-29 RX ADMIN — FAMOTIDINE 20 MG: 20 TABLET ORAL at 09:46

## 2022-01-29 RX ADMIN — OXYCODONE HYDROCHLORIDE 15 MG: 5 TABLET ORAL at 03:28

## 2022-01-29 RX ADMIN — ENOXAPARIN SODIUM 90 MG: 100 INJECTION SUBCUTANEOUS at 09:49

## 2022-01-29 RX ADMIN — OXYCODONE HYDROCHLORIDE 15 MG: 5 TABLET ORAL at 11:40

## 2022-01-29 ASSESSMENT — PAIN DESCRIPTION - ORIENTATION: ORIENTATION: RIGHT;LEFT

## 2022-01-29 ASSESSMENT — PAIN DESCRIPTION - FREQUENCY: FREQUENCY: CONTINUOUS

## 2022-01-29 ASSESSMENT — PAIN SCALES - GENERAL
PAINLEVEL_OUTOF10: 7
PAINLEVEL_OUTOF10: 6

## 2022-01-29 ASSESSMENT — PAIN DESCRIPTION - DESCRIPTORS: DESCRIPTORS: ACHING

## 2022-01-29 ASSESSMENT — PAIN DESCRIPTION - LOCATION: LOCATION: GENERALIZED

## 2022-01-29 ASSESSMENT — PAIN DESCRIPTION - PAIN TYPE: TYPE: CHRONIC PAIN

## 2022-01-29 NOTE — DISCHARGE SUMMARY
Hospital Medicine Discharge Summary    Patient ID: Arlene Goetz      Patient's PCP: No primary care provider on file. Admit Date: 1/18/2022     Discharge Date: 1/29/2022      Admitting Provider: No admitting provider for patient encounter. Discharge Provider: KATELYN Bennett     Discharge Diagnoses: Active Hospital Problems    Diagnosis     Elevated d-dimer [R79.89]     Elevated ferritin level [R79.89]     Pulmonary infiltrates [R91.8]     Current smoker [F17.200]     Marijuana use [F12.90]     Elevated C-reactive protein (CRP) [R79.82]     Overweight (BMI 25.0-29. 9) [E66.3]     Chronic prescription opiate use [Z79.891]     Hyperglycemia [R73.9]     Acute hypoxemic respiratory failure (HCC) [J96.01]     COPD (chronic obstructive pulmonary disease) (HCC) [J44.9]     Tachycardia [R00.0]     Tachypnea [R06.82]     Elevated lactic acid level [R79.89]        The patient was seen and examined on day of discharge and this discharge summary is in conjunction with any daily progress note from day of discharge. Hospital Course:   Pt is an 78y.o. year-old male with a history of COPD who presents to the emergency room for evaluation following a 2-week history of shortness of breath, and intermittent nonproductive cough and intermittent episodes of emesis.  The emesis is nonbilious nonbloody.  In the emergency room he was found to have an oxygen saturation of 83% on room air.  He had a rapid COVID test performed which was negative.  However, imaging has findings suggestive of a COVID-19 pneumoniaKaylie Dos Santos is being admitted for further evaluation and treatment.  Associated signs and symptoms do not include fever or chills, abdominal pain, hemoptysis, hematochezia, diarrhea, constipation or urinary symptoms.     Acute hypoxic respiratory failure, POA- likely due to [address] evidenced by respiratory distress, use of accessory muscles and O2 saturation of less than 90% on room air on presentation, PCR positive  -Pulmonology is consulted during hospitalization, completed 10-day course of IV steroids, did receive baricitinib during hospitalization  - Rapid Influenza A&B negative  - Blood cultures x 2 remain negative  - Respiratory culture negative  - Legionella pneumophilia and Strep pneumoniae urine antigens ordered and neg  - Procalcitonin 0.09 (not elevated)  -Maintain oxygenation on 5 L nasal cannula for 24 hours, discharged with home O2 and pulse oximetry  -Consulted inpatient Home health care  -Patient to remain in isolation until 20 days post symptom onset     Cough  -provided antitussive medications as needed     Non-Intractable vomiting with nausea, resolved   Poor appetite, resolved  -improved, patient encouraged to eat a balanced diet, including protein-rich foods.   -provided high calorie, high protein dietary supplements     Generalized weakness/fatigue   -likely from covid, improving   -Evaluated by PT and OT during hospitalization     Sepsis ([address] due to Pneumonia with tachycardia, tachypnea. Initial Lactic Acid was elevated.   -Lactate WNL  - Blood cultures x 2 negative      COPD (chronic obstructive pulmonary disease) (HCC) -without acute exacerbation.    -Will monitor and provide breathing treatments as indicated      Physical Exam Performed:     /71   Pulse 68   Temp 97.8 °F (36.6 °C) (Oral)   Resp 18   Ht 5' 10\" (1.778 m)   Wt 191 lb 5.8 oz (86.8 kg)   SpO2 90%   BMI 27.46 kg/m²       General appearance:  No apparent distress, appears stated age and cooperative. HEENT:  Normal cephalic, atraumatic without obvious deformity. Pupils equal, round, and reactive to light. Extra ocular muscles intact. Conjunctivae/corneas clear. Neck: Supple, with full range of motion. No jugular venous distention. Trachea midline. Respiratory: On 5 L via nasal cannula normal respiratory effort. Clear to auscultation, bilaterally without Rales/Wheezes/Rhonchi.   Cardiovascular: Regular rate and rhythm with normal S1/S2 without murmurs, rubs or gallops. Abdomen: Soft, non-tender, non-distended with normal bowel sounds. Musculoskeletal:  No clubbing, cyanosis or edema bilaterally. Full range of motion without deformity. Skin: Skin color, texture, turgor normal.  No rashes or lesions. Neurologic:  Neurovascularly intact without any focal sensory/motor deficits. Cranial nerves: II-XII intact, grossly non-focal.  Psychiatric:  Alert and oriented, thought content appropriate, normal insight  Capillary Refill: Brisk,< 3 seconds   Peripheral Pulses: +2 palpable, equal bilaterally       Labs: For convenience and continuity at follow-up the following most recent labs are provided:      CBC:    Lab Results   Component Value Date    WBC 11.6 01/29/2022    HGB 13.4 01/29/2022    HCT 40.4 01/29/2022     01/29/2022       Renal:    Lab Results   Component Value Date     01/29/2022    K 4.0 01/29/2022    K 4.7 01/19/2022     01/29/2022    CO2 26 01/29/2022    BUN 30 01/29/2022    CREATININE 0.6 01/29/2022    CALCIUM 8.3 01/29/2022    PHOS 3.5 01/29/2022         Significant Diagnostic Studies    Radiology:   XR CHEST PORTABLE   Final Result   Bilateral pulmonary opacification concerning for multifocal pneumonia   including COVID-19 pneumonia.                 Consults:     IP CONSULT TO HOSPITALIST  IP CONSULT TO PULMONOLOGY  IP CONSULT TO HOME CARE NEEDS    Disposition: Home with home health care    Condition at Discharge: Stable    Discharge Instructions/Follow-up:    Follow-up with PCP in 1 to 2 weeks    Code Status:  Prior     Activity: activity as tolerated    Diet: regular diet      Discharge Medications:     Discharge Medication List as of 1/29/2022  9:59 AM           Details   guaiFENesin (MUCINEX) 600 MG extended release tablet Take 1 tablet by mouth 2 times daily for 14 days, Disp-28 tablet, R-0Normal              Details   albuterol sulfate  (90 Base) MCG/ACT inhaler INHALE 2 PUFFS BY ORAL INHALATION EVERY 6 HOURS AS NEEDED FOR SHORTNESS OF BREATH. SHAKE WELL; RINSE MOUTHPIECE FREQUENTLY TO PREVENT CLOGGING. Historical Med      gabapentin (NEURONTIN) 600 MG tablet TAKE TWO TABLETS BY MOUTH THREE TIMES A DAY DOSE INCREASEHistorical Med      fluticasone-salmeterol (ADVAIR) 500-50 MCG/DOSE diskus inhaler INHALE 1 PUFF BY ORAL INHALATION TWICE A DAY FOR BREATHING. GARGLE AND RINSE YOUR MOUTH WITH WATER AFTER USING. DO NOT SWALLOW RINSE WATER. **REPLACES SYMBICORT**Historical Med      oxyCODONE HCl (OXY-IR) 10 MG immediate release tablet TAKE ONE TABLET BY MOUTH AS DIRECTED AS NEEDED --- TAKE ONE AND ONE-HALF TABLETS (15MG) EVERY FOUR HOURS. AVOID TAKING WITHIN TWO HOURS OF GABAPENTIN. (FOR 11/03 THROUGH 11/30)Historical Med      aspirin EC 81 MG EC tablet Take 1 tablet by mouth daily, Disp-30 tablet, R-0Print             Time Spent on discharge is more than 30 minutes in the examination, evaluation, counseling and review of medications and discharge plan. Signed:    KATELYN Fernandez   1/29/2022      Thank you No primary care provider on file. for the opportunity to be involved in this patient's care. If you have any questions or concerns please feel free to contact me at [phone].

## 2022-01-29 NOTE — PROGRESS NOTES
Discharge instructions reviewed with pt. Pt's son has the 02 tank and is going to come up to floor to give it to patient. All 02 supplies were dropped off at pt's home. Pt to follow up with PCP in 1-2 weeks or if symptoms worsen. All questions answered.

## 2022-01-29 NOTE — PLAN OF CARE
Problem: Falls - Risk of:  Goal: Will remain free from falls  Description: Will remain free from falls  Outcome: Completed  Goal: Absence of physical injury  Description: Absence of physical injury  Outcome: Completed     Problem: Pain:  Goal: Pain level will decrease  Description: Pain level will decrease  Outcome: Completed  Goal: Control of acute pain  Description: Control of acute pain  Outcome: Completed  Goal: Control of chronic pain  Description: Control of chronic pain  Outcome: Completed     Problem: Airway Clearance - Ineffective  Goal: Achieve or maintain patent airway  Outcome: Completed     Problem: Gas Exchange - Impaired  Goal: Absence of hypoxia  Outcome: Completed  Goal: Promote optimal lung function  Outcome: Completed     Problem: Breathing Pattern - Ineffective  Goal: Ability to achieve and maintain a regular respiratory rate  Outcome: Completed     Problem:  Body Temperature -  Risk of, Imbalanced  Goal: Ability to maintain a body temperature within defined limits  Outcome: Completed  Goal: Will regain or maintain usual level of consciousness  Outcome: Completed  Goal: Complications related to the disease process, condition or treatment will be avoided or minimized  Outcome: Completed     Problem: Isolation Precautions - Risk of Spread of Infection  Goal: Prevent transmission of infection  Outcome: Completed     Problem: Nutrition Deficits  Goal: Optimize nutritional status  Outcome: Completed     Problem: Risk for Fluid Volume Deficit  Goal: Maintain normal heart rhythm  Outcome: Completed  Goal: Maintain absence of muscle cramping  Outcome: Completed  Goal: Maintain normal serum potassium, sodium, calcium, phosphorus, and pH  Outcome: Completed     Problem: Loneliness or Risk for Loneliness  Goal: Demonstrate positive use of time alone when socialization is not possible  Outcome: Completed     Problem: Fatigue  Goal: Verbalize increase energy and improved vitality  Outcome: Completed Problem: Patient Education: Go to Patient Education Activity  Goal: Patient/Family Education  Outcome: Completed     Problem: Pain:  Goal: Pain level will decrease  Description: Pain level will decrease  Outcome: Completed     Problem: Airway Clearance - Ineffective  Goal: Achieve or maintain patent airway  Outcome: Completed     Problem: Gas Exchange - Impaired  Goal: Absence of hypoxia  Outcome: Completed     Problem: Breathing Pattern - Ineffective  Goal: Ability to achieve and maintain a regular respiratory rate  Outcome: Completed     Problem:  Body Temperature -  Risk of, Imbalanced  Goal: Ability to maintain a body temperature within defined limits  Outcome: Completed     Problem: Isolation Precautions - Risk of Spread of Infection  Goal: Prevent transmission of infection  Outcome: Completed     Problem: Nutrition Deficits  Goal: Optimize nutritional status  Outcome: Completed     Problem: Loneliness or Risk for Loneliness  Goal: Demonstrate positive use of time alone when socialization is not possible  Outcome: Completed     Problem: Fatigue  Goal: Verbalize increase energy and improved vitality  Outcome: Completed     Problem: Patient Education: Go to Patient Education Activity  Goal: Patient/Family Education  Outcome: Completed

## 2022-01-31 ENCOUNTER — CARE COORDINATION (OUTPATIENT)
Dept: CASE MANAGEMENT | Age: 67
End: 2022-01-31

## 2022-01-31 NOTE — CARE COORDINATION
Uriel Queen is in progress; awaiting faxed insurance auth for home care  [phone]    Lissa Barkley RN, BSN CTN  [facility] [phone]

## 2022-01-31 NOTE — CARE COORDINATION
Transitions of Care Call  Call within 2 business days of discharge: Yes    Patient: Carroll Holcomb Patient : 1955   MRN: [id number]  Reason for Admission: covid  Discharge Date: 22 RARS: Readmission Risk Score: 10.4 ( )      Last Discharge [facility]       Complaint Diagnosis Description Type Department Provider    22 Shortness of Breath Acute hypoxemic respiratory failure due to COVID-19 [facility]) ED to Hosp-Admission (Discharged) (ADMITTED) [facility] C5 Narendra Joseph MD; Dick Yuen. .. Spoke to BODØ at [facility] and stated that agency was waiting for authorization from [facility] before scheduling [facility]. Confirmed that she received referral from hospital.    Challenges to be reviewed by the provider   Additional needs identified to be addressed with provider: No  home health [address]                 Encounter was not routed to provider for escalation. Method of communication with provider: none. Discussed COVID-19 related testing which was: available at this time. Test results were: positive. Patient informed of results, if available? Yes. Current Symptoms: cough, no new symptoms and no worsening symptoms    Reviewed New or Changed Meds: yes    Do you have what you need at home?  Durable Medical Equipment ordered at discharge: [facility]/Outpatient orders at discharge: home health care   Was patient discharged with a pulse oximeter? No Discussed and confirmed pulse oximeter discharge instructions and when to notify provider or seek emergency care. Patient education provided: Reviewed appropriate site of care based on symptoms and resources available to patient including: PCP. Follow up appointment scheduled within 7 days of discharge: no. If no appointment scheduled, scheduling offered: yes  No future appointments. Patient answered call and verified . Patient pleasant and agreeable to transition call.   Continues to have moist cough and reached out to [facility] PCP in regards to follow up visit. Informed that home care agency is waiting from [place] for authorization and visit would be scheduled. Stated understanding. Reviewed oxygen/fire safety with patient due to he was new to O2. Denied any acute needs at present time. Educated on the use of urgent care or physicians 24 hr access line if assistance is needed after hours. Interventions: Obtained and reviewed discharge summary and/or continuity of care documents  Communication with home health agencies or other community services the patient is currently [facility]  Reviewed discharge instructions, medical action plan and red flags with patient who verbalized understanding. No further follow-up call indicated based on severity of symptoms and risk factors. Provided contact information for future needs.     Ce Hodges RN

## 2022-02-01 NOTE — CARE COORDINATION
Spoke to Shelly sent to [facility]    Pt was referred to [facility] maylin Garcia, GENAN CTN  [facility] [phone]

## 2022-05-09 ENCOUNTER — TELEPHONE (OUTPATIENT)
Dept: PULMONOLOGY | Age: 67
End: 2022-05-09

## 2022-05-09 NOTE — TELEPHONE ENCOUNTER
Pt. Cancelled new pt. Referral to [facility]  for post COVID pnu. Will let ref.  Provider know

## 2022-07-11 ENCOUNTER — HOSPITAL ENCOUNTER (OUTPATIENT)
Dept: MRI IMAGING | Age: 67
Discharge: HOME | End: 2022-07-11
Payer: OTHER GOVERNMENT

## 2022-07-11 DIAGNOSIS — I67.1 CEREBRAL ANEURYSM: ICD-10-CM

## 2022-07-11 PROCEDURE — 70544 MR ANGIOGRAPHY HEAD W/O DYE: CPT

## 2024-02-05 ENCOUNTER — APPOINTMENT (OUTPATIENT)
Dept: CT IMAGING | Age: 69
End: 2024-02-05
Payer: OTHER GOVERNMENT

## 2024-02-05 ENCOUNTER — APPOINTMENT (OUTPATIENT)
Dept: GENERAL RADIOLOGY | Age: 69
End: 2024-02-05
Payer: OTHER GOVERNMENT

## 2024-02-05 ENCOUNTER — HOSPITAL ENCOUNTER (OUTPATIENT)
Age: 69
Setting detail: OBSERVATION
Discharge: HOME | End: 2024-02-06
Attending: STUDENT IN AN ORGANIZED HEALTH CARE EDUCATION/TRAINING PROGRAM | Admitting: INTERNAL MEDICINE
Payer: OTHER GOVERNMENT

## 2024-02-05 DIAGNOSIS — R29.898 LEFT HAND WEAKNESS: ICD-10-CM

## 2024-02-05 DIAGNOSIS — G58.9 PERIPHERAL NERVE PALSY: Primary | ICD-10-CM

## 2024-02-05 LAB
ALBUMIN SERPL-MCNC: 4 G/DL (ref 3.4–5)
ALBUMIN/GLOB SERPL: 1.3 {RATIO} (ref 1.1–2.2)
ALP SERPL-CCNC: 60 U/L (ref 40–129)
ALT SERPL-CCNC: 9 U/L (ref 10–40)
ANION GAP SERPL CALCULATED.3IONS-SCNC: 9 MMOL/L (ref 3–16)
AST SERPL-CCNC: 15 U/L (ref 15–37)
BASOPHILS # BLD: 0 K/UL (ref 0–0.2)
BASOPHILS NFR BLD: 0.6 %
BILIRUB SERPL-MCNC: 0.4 MG/DL (ref 0–1)
BUN SERPL-MCNC: 15 MG/DL (ref 7–20)
CALCIUM SERPL-MCNC: 9 MG/DL (ref 8.3–10.6)
CHLORIDE SERPL-SCNC: 102 MMOL/L (ref 99–110)
CO2 SERPL-SCNC: 27 MMOL/L (ref 21–32)
CREAT SERPL-MCNC: 0.8 MG/DL (ref 0.8–1.3)
DEPRECATED RDW RBC AUTO: 15.3 % (ref 12.4–15.4)
EKG ATRIAL RATE: 78 BPM
EKG DIAGNOSIS: NORMAL
EKG P AXIS: 67 DEGREES
EKG P-R INTERVAL: 152 MS
EKG Q-T INTERVAL: 372 MS
EKG QRS DURATION: 80 MS
EKG QTC CALCULATION (BAZETT): 424 MS
EKG R AXIS: 5 DEGREES
EKG T AXIS: 36 DEGREES
EKG VENTRICULAR RATE: 78 BPM
EOSINOPHIL # BLD: 0.2 K/UL (ref 0–0.6)
EOSINOPHIL NFR BLD: 2.5 %
GFR SERPLBLD CREATININE-BSD FMLA CKD-EPI: >60 ML/MIN/{1.73_M2}
GLUCOSE BLD-MCNC: 114 MG/DL (ref 70–99)
GLUCOSE SERPL-MCNC: 109 MG/DL (ref 70–99)
HCT VFR BLD AUTO: 38.6 % (ref 40.5–52.5)
HGB BLD-MCNC: 12.7 G/DL (ref 13.5–17.5)
INR PPP: 1.01 (ref 0.84–1.16)
LYMPHOCYTES # BLD: 1.5 K/UL (ref 1–5.1)
LYMPHOCYTES NFR BLD: 21.7 %
MCH RBC QN AUTO: 29.4 PG (ref 26–34)
MCHC RBC AUTO-ENTMCNC: 32.9 G/DL (ref 31–36)
MCV RBC AUTO: 89.4 FL (ref 80–100)
MONOCYTES # BLD: 0.5 K/UL (ref 0–1.3)
MONOCYTES NFR BLD: 7.8 %
NEUTROPHILS # BLD: 4.6 K/UL (ref 1.7–7.7)
NEUTROPHILS NFR BLD: 67.4 %
PERFORMED ON: ABNORMAL
PLATELET # BLD AUTO: 167 K/UL (ref 135–450)
PMV BLD AUTO: 7.1 FL (ref 5–10.5)
POTASSIUM SERPL-SCNC: 4.1 MMOL/L (ref 3.5–5.1)
PROT SERPL-MCNC: 7.1 G/DL (ref 6.4–8.2)
PROTHROMBIN TIME: 13.4 SEC (ref 11.5–14.8)
RBC # BLD AUTO: 4.32 M/UL (ref 4.2–5.9)
SODIUM SERPL-SCNC: 138 MMOL/L (ref 136–145)
TROPONIN, HIGH SENSITIVITY: 13 NG/L (ref 0–22)
WBC # BLD AUTO: 6.8 K/UL (ref 4–11)

## 2024-02-05 PROCEDURE — 97165 OT EVAL LOW COMPLEX 30 MIN: CPT

## 2024-02-05 PROCEDURE — 80053 COMPREHEN METABOLIC PANEL: CPT

## 2024-02-05 PROCEDURE — 71045 X-RAY EXAM CHEST 1 VIEW: CPT

## 2024-02-05 PROCEDURE — 84484 ASSAY OF TROPONIN QUANT: CPT

## 2024-02-05 PROCEDURE — 6370000000 HC RX 637 (ALT 250 FOR IP): Performed by: NURSE PRACTITIONER

## 2024-02-05 PROCEDURE — 97530 THERAPEUTIC ACTIVITIES: CPT

## 2024-02-05 PROCEDURE — 70498 CT ANGIOGRAPHY NECK: CPT

## 2024-02-05 PROCEDURE — 96372 THER/PROPH/DIAG INJ SC/IM: CPT

## 2024-02-05 PROCEDURE — G0378 HOSPITAL OBSERVATION PER HR: HCPCS

## 2024-02-05 PROCEDURE — 6360000002 HC RX W HCPCS: Performed by: NURSE PRACTITIONER

## 2024-02-05 PROCEDURE — 94640 AIRWAY INHALATION TREATMENT: CPT

## 2024-02-05 PROCEDURE — 2580000003 HC RX 258: Performed by: NURSE PRACTITIONER

## 2024-02-05 PROCEDURE — 99285 EMERGENCY DEPT VISIT HI MDM: CPT

## 2024-02-05 PROCEDURE — 85025 COMPLETE CBC W/AUTO DIFF WBC: CPT

## 2024-02-05 PROCEDURE — 6370000000 HC RX 637 (ALT 250 FOR IP): Performed by: STUDENT IN AN ORGANIZED HEALTH CARE EDUCATION/TRAINING PROGRAM

## 2024-02-05 PROCEDURE — 93005 ELECTROCARDIOGRAM TRACING: CPT | Performed by: STUDENT IN AN ORGANIZED HEALTH CARE EDUCATION/TRAINING PROGRAM

## 2024-02-05 PROCEDURE — 6360000004 HC RX CONTRAST MEDICATION: Performed by: STUDENT IN AN ORGANIZED HEALTH CARE EDUCATION/TRAINING PROGRAM

## 2024-02-05 PROCEDURE — 85610 PROTHROMBIN TIME: CPT

## 2024-02-05 PROCEDURE — 70450 CT HEAD/BRAIN W/O DYE: CPT

## 2024-02-05 RX ORDER — POTASSIUM CHLORIDE 20 MEQ/1
40 TABLET, EXTENDED RELEASE ORAL PRN
Status: DISCONTINUED | OUTPATIENT
Start: 2024-02-05 | End: 2024-02-06

## 2024-02-05 RX ORDER — ONDANSETRON 4 MG/1
4 TABLET, ORALLY DISINTEGRATING ORAL EVERY 8 HOURS PRN
Status: DISCONTINUED | OUTPATIENT
Start: 2024-02-05 | End: 2024-02-06 | Stop reason: HOSPADM

## 2024-02-05 RX ORDER — TAMSULOSIN HYDROCHLORIDE 0.4 MG/1
0.8 CAPSULE ORAL DAILY
Status: DISCONTINUED | OUTPATIENT
Start: 2024-02-06 | End: 2024-02-06 | Stop reason: HOSPADM

## 2024-02-05 RX ORDER — POTASSIUM CHLORIDE 7.45 MG/ML
10 INJECTION INTRAVENOUS PRN
Status: DISCONTINUED | OUTPATIENT
Start: 2024-02-05 | End: 2024-02-06

## 2024-02-05 RX ORDER — ENOXAPARIN SODIUM 100 MG/ML
40 INJECTION SUBCUTANEOUS DAILY
Status: DISCONTINUED | OUTPATIENT
Start: 2024-02-05 | End: 2024-02-06 | Stop reason: HOSPADM

## 2024-02-05 RX ORDER — POLYETHYLENE GLYCOL 3350 17 G/17G
17 POWDER, FOR SOLUTION ORAL DAILY PRN
Status: DISCONTINUED | OUTPATIENT
Start: 2024-02-05 | End: 2024-02-06 | Stop reason: HOSPADM

## 2024-02-05 RX ORDER — IBUPROFEN 400 MG/1
800 TABLET ORAL EVERY 8 HOURS PRN
Status: DISCONTINUED | OUTPATIENT
Start: 2024-02-05 | End: 2024-02-06 | Stop reason: HOSPADM

## 2024-02-05 RX ORDER — SODIUM CHLORIDE 9 MG/ML
INJECTION, SOLUTION INTRAVENOUS PRN
Status: DISCONTINUED | OUTPATIENT
Start: 2024-02-05 | End: 2024-02-06 | Stop reason: HOSPADM

## 2024-02-05 RX ORDER — BUDESONIDE AND FORMOTEROL FUMARATE DIHYDRATE 160; 4.5 UG/1; UG/1
2 AEROSOL RESPIRATORY (INHALATION) 2 TIMES DAILY
COMMUNITY

## 2024-02-05 RX ORDER — SODIUM CHLORIDE 0.9 % (FLUSH) 0.9 %
5-40 SYRINGE (ML) INJECTION EVERY 12 HOURS SCHEDULED
Status: DISCONTINUED | OUTPATIENT
Start: 2024-02-05 | End: 2024-02-06 | Stop reason: HOSPADM

## 2024-02-05 RX ORDER — CLOPIDOGREL BISULFATE 75 MG/1
1 TABLET ORAL DAILY
COMMUNITY
Start: 2022-09-03

## 2024-02-05 RX ORDER — ACETAMINOPHEN 650 MG/1
650 SUPPOSITORY RECTAL EVERY 6 HOURS PRN
Status: DISCONTINUED | OUTPATIENT
Start: 2024-02-05 | End: 2024-02-06 | Stop reason: HOSPADM

## 2024-02-05 RX ORDER — ACETAMINOPHEN 325 MG/1
650 TABLET ORAL EVERY 6 HOURS PRN
Status: DISCONTINUED | OUTPATIENT
Start: 2024-02-05 | End: 2024-02-06 | Stop reason: HOSPADM

## 2024-02-05 RX ORDER — MAGNESIUM SULFATE IN WATER 40 MG/ML
2000 INJECTION, SOLUTION INTRAVENOUS PRN
Status: DISCONTINUED | OUTPATIENT
Start: 2024-02-05 | End: 2024-02-06

## 2024-02-05 RX ORDER — ONDANSETRON 2 MG/ML
4 INJECTION INTRAMUSCULAR; INTRAVENOUS EVERY 6 HOURS PRN
Status: DISCONTINUED | OUTPATIENT
Start: 2024-02-05 | End: 2024-02-06 | Stop reason: HOSPADM

## 2024-02-05 RX ORDER — NICOTINE 21 MG/24HR
1 PATCH, TRANSDERMAL 24 HOURS TRANSDERMAL DAILY
Status: DISCONTINUED | OUTPATIENT
Start: 2024-02-05 | End: 2024-02-06 | Stop reason: HOSPADM

## 2024-02-05 RX ORDER — SODIUM CHLORIDE 0.9 % (FLUSH) 0.9 %
5-40 SYRINGE (ML) INJECTION PRN
Status: DISCONTINUED | OUTPATIENT
Start: 2024-02-05 | End: 2024-02-06 | Stop reason: HOSPADM

## 2024-02-05 RX ORDER — OXYCODONE HYDROCHLORIDE 5 MG/1
15 TABLET ORAL ONCE
Status: COMPLETED | OUTPATIENT
Start: 2024-02-05 | End: 2024-02-05

## 2024-02-05 RX ORDER — CLOPIDOGREL BISULFATE 75 MG/1
75 TABLET ORAL DAILY
Status: DISCONTINUED | OUTPATIENT
Start: 2024-02-06 | End: 2024-02-06 | Stop reason: HOSPADM

## 2024-02-05 RX ORDER — OXYCODONE HYDROCHLORIDE 15 MG/1
15 TABLET ORAL EVERY 4 HOURS PRN
Status: DISCONTINUED | OUTPATIENT
Start: 2024-02-05 | End: 2024-02-06 | Stop reason: HOSPADM

## 2024-02-05 RX ORDER — ASPIRIN 81 MG/1
81 TABLET ORAL DAILY
Status: DISCONTINUED | OUTPATIENT
Start: 2024-02-05 | End: 2024-02-06 | Stop reason: HOSPADM

## 2024-02-05 RX ORDER — TRAZODONE HYDROCHLORIDE 50 MG/1
50 TABLET ORAL NIGHTLY
Status: DISCONTINUED | OUTPATIENT
Start: 2024-02-05 | End: 2024-02-06 | Stop reason: HOSPADM

## 2024-02-05 RX ORDER — TRAZODONE HYDROCHLORIDE 50 MG/1
TABLET ORAL
COMMUNITY
Start: 2023-10-16 | End: 2024-10-16

## 2024-02-05 RX ORDER — TAMSULOSIN HYDROCHLORIDE 0.4 MG/1
0.8 CAPSULE ORAL
COMMUNITY
Start: 2023-12-08

## 2024-02-05 RX ORDER — ALBUTEROL SULFATE 90 UG/1
2 AEROSOL, METERED RESPIRATORY (INHALATION) EVERY 6 HOURS PRN
Status: DISCONTINUED | OUTPATIENT
Start: 2024-02-05 | End: 2024-02-06 | Stop reason: HOSPADM

## 2024-02-05 RX ORDER — GABAPENTIN 300 MG/1
600 CAPSULE ORAL 3 TIMES DAILY
Status: DISCONTINUED | OUTPATIENT
Start: 2024-02-05 | End: 2024-02-06 | Stop reason: HOSPADM

## 2024-02-05 RX ADMIN — ENOXAPARIN SODIUM 40 MG: 100 INJECTION SUBCUTANEOUS at 16:45

## 2024-02-05 RX ADMIN — OXYCODONE HYDROCHLORIDE 15 MG: 5 TABLET ORAL at 12:05

## 2024-02-05 RX ADMIN — OXYCODONE 15 MG: 15 TABLET ORAL at 16:43

## 2024-02-05 RX ADMIN — Medication 2 PUFF: at 20:04

## 2024-02-05 RX ADMIN — TRAZODONE HYDROCHLORIDE 50 MG: 50 TABLET ORAL at 21:44

## 2024-02-05 RX ADMIN — IOPAMIDOL 75 ML: 755 INJECTION, SOLUTION INTRAVENOUS at 10:42

## 2024-02-05 RX ADMIN — OXYCODONE 15 MG: 15 TABLET ORAL at 21:44

## 2024-02-05 RX ADMIN — GABAPENTIN 600 MG: 300 CAPSULE ORAL at 21:44

## 2024-02-05 RX ADMIN — Medication 10 ML: at 21:48

## 2024-02-05 ASSESSMENT — PAIN SCALES - GENERAL
PAINLEVEL_OUTOF10: 6
PAINLEVEL_OUTOF10: 7
PAINLEVEL_OUTOF10: 0
PAINLEVEL_OUTOF10: 7

## 2024-02-05 ASSESSMENT — LIFESTYLE VARIABLES
HOW MANY STANDARD DRINKS CONTAINING ALCOHOL DO YOU HAVE ON A TYPICAL DAY: PATIENT DOES NOT DRINK
HOW OFTEN DO YOU HAVE A DRINK CONTAINING ALCOHOL: NEVER

## 2024-02-05 ASSESSMENT — PAIN - FUNCTIONAL ASSESSMENT
PAIN_FUNCTIONAL_ASSESSMENT: NONE - DENIES PAIN
PAIN_FUNCTIONAL_ASSESSMENT: NONE - DENIES PAIN
PAIN_FUNCTIONAL_ASSESSMENT: PREVENTS OR INTERFERES SOME ACTIVE ACTIVITIES AND ADLS
PAIN_FUNCTIONAL_ASSESSMENT: ACTIVITIES ARE NOT PREVENTED

## 2024-02-05 ASSESSMENT — PAIN DESCRIPTION - ORIENTATION
ORIENTATION: MID
ORIENTATION: POSTERIOR

## 2024-02-05 ASSESSMENT — PAIN DESCRIPTION - DESCRIPTORS
DESCRIPTORS: THROBBING
DESCRIPTORS: DULL;THROBBING

## 2024-02-05 ASSESSMENT — PAIN DESCRIPTION - LOCATION
LOCATION: NECK
LOCATION: NECK

## 2024-02-05 NOTE — ED PROVIDER NOTES
[facility]  ED  EMERGENCY DEPARTMENT ENCOUNTER        Patient Name: Ady Hawkins  MRN: [id number]  Birthdate 1955  Date of evaluation: 2/5/2024  Provider: Rosa Perkins MD  PCP: No primary care provider on file.  Note Started: 10:32 AM EST 2/5/24      CHIEF COMPLAINT  Extremity Weakness          HISTORY & PHYSICAL     HISTORY OF PRESENT ILLNESS  History from : Patient    Limitations to history : None    Ady Hawkins is a 68 y.o. male  has a past medical history of COPD (chronic obstructive pulmonary disease) ([facility])., who presents to the ED complaining of left hand weakness.  Patient reports that he woke up at 1230 last night while in his armchair and noted his left hand was not moving appropriately.  He went back to bed and renoted symptoms had not changed at 3:30 AM or at 7:30 AM so patient decided to be evaluated.  Delay in presentation due to patient thinking it may be related to falling asleep with his arms on the armrest.  He states he was sitting in the chair normally.  He denies any other weakness.  Denies numbness..  Patient does report a history of brain aneurysms, reports he has a coil placed in 1.  He denies headache or head trauma.    Old records reviewed: No pertinent information noted.    No other complaints, modifying factors or associated symptoms.  Nursing Notes were all reviewed and agreed with or any disagreements were addressed in the HPI.    I have reviewed the following from the nursing documentation.    Past Medical History:   Diagnosis Date    COPD (chronic obstructive pulmonary disease) ([facility])      No past surgical history on file.  No family history on file.  Social History     Socioeconomic History    Marital status:      Spouse name: Not on file    Number of children: Not on file    Years of education: Not on file    Highest education level: Not on file   Occupational History    Not on file   Tobacco Use    Smoking status: Every Day

## 2024-02-05 NOTE — ED NOTES
Mr. Hawkins is a 68 y.o. male who had concerns including Extremity Weakness (Patient reports at 1230 last night noticed his right wrist/ fingers was not extending/ flexing like normal and a weaker hand , thought his arm fell asleep d/t placement on arm rest, woke up around 0330 and noticed persistent symptoms. Hx of 2 aneurysms, coil in one. Denies pain numbness/ tingling or weakness on other extremities. ).    Chief Complaint   Patient presents with    Extremity Weakness     Patient reports at 1230 last night noticed his right wrist/ fingers was not extending/ flexing like normal and a weaker hand , thought his arm fell asleep d/t placement on arm rest, woke up around 0330 and noticed persistent symptoms. Hx of 2 aneurysms, coil in one. Denies pain numbness/ tingling or weakness on other extremities.        He is being admitted for:    <principal problem not specified>    His ED problem list included:    No diagnosis found.    Past Medical History:   Diagnosis Date    COPD (chronic obstructive pulmonary disease) (HCC)        No past surgical history on file.    His recent abnormal labs were:    Labs Reviewed   CBC WITH AUTO DIFFERENTIAL - Abnormal; Notable for the following components:       Result Value    Hemoglobin 12.7 (*)     Hematocrit 38.6 (*)     All other components within normal limits   COMPREHENSIVE METABOLIC PANEL W/ REFLEX TO MG FOR LOW K - Abnormal; Notable for the following components:    Glucose 109 (*)     ALT 9 (*)     All other components within normal limits   POCT GLUCOSE - Abnormal; Notable for the following components:    POC Glucose 114 (*)     All other components within normal limits   TROPONIN   PROTIME-INR   POCT GLUCOSE       His vital signs for the encounter were:    Patient Vitals for the past 24 hrs:   BP Temp Temp src Pulse Resp SpO2 Height Weight   02/05/24 1206 -- -- -- -- -- -- -- 73.8 kg (162 lb 9.6 oz)   02/05/24 1150 (!) 131/91 -- -- 72 16 96 % -- --   02/05/24 1120

## 2024-02-05 NOTE — H&P
of the patient w/ the Emergency Department Provider: Dr. Perkins    CXR: I have reviewed the CXR with the following interpretation: Central pulmonary vascular congestion without evidence of overt edema.   EKG:  I have reviewed the EKG with the following interpretation: SR    Physical Exam Performed:      BP (!) 131/91   Pulse 72   Temp 98.1 °F (36.7 °C) (Oral)   Resp 16   Ht 1.778 m (5' 10\")   Wt 73.8 kg (162 lb 9.6 oz)   SpO2 96%   BMI 23.33 kg/m²     General appearance:  No apparent distress, appears stated age and cooperative.  HEENT:  Pupils equal, round, and reactive to light. Conjunctivae/corneas clear.  Respiratory:  Normal respiratory effort. Clear to auscultation, bilaterally without Rales/Wheezes/Rhonchi.  Cardiovascular:  Regular rate and rhythm with normal S1/S2 without murmurs, rubs or gallops.  Abdomen:  Soft, non-tender, non-distended with normal bowel sounds.  Musculoskeletal:  No clubbing, cyanosis or edema bilaterally.  Full range of motion without deformity.  Neurologic:  left wrist weakness, +sensation, difficulty with hyperextension, denies numbness/tingling  Psychiatric:  Alert and oriented, thought content appropriate, normal insight  Skin:  Skin color, texture, turgor normal.  No rashes or lesions.  Capillary Refill:  Brisk,< 3 seconds   Peripheral Pulses:  +2 palpable, equal bilaterally     Diet: [x]Adult/General  []Cardiac  []Diabetic  []Low Fat  []NPO  []NPO after Midnight  []Other   DVT Prophylaxis: [x]PPx LMWH  []SQ Heparin  []IPC/SCDs  []Eliquis  []Xarelto  []Coumadin     Code status: [x]Full  []DNR/CCA  []Limited (DNR/CCA with Do Not Intubate)  []DNRCC  PT/OT Eval Status:   []NOT yet ordered  [x]Ordered and Pending   []Seen with Recommendations for:  []Home independently  []Home w/ assist  []HHC  []SNF  []Acute Rehab    Anticipated Discharge Day/Date:  pending neuro recs    Anticipated Discharge Location: [x]Home  []HHC  []SNF  []Acute Rehab  []ECF  []LTAC  []Hospice    Consults:

## 2024-02-06 VITALS
TEMPERATURE: 97.3 F | SYSTOLIC BLOOD PRESSURE: 114 MMHG | WEIGHT: 162.6 LBS | DIASTOLIC BLOOD PRESSURE: 72 MMHG | BODY MASS INDEX: 23.28 KG/M2 | HEART RATE: 76 BPM | HEIGHT: 70 IN | OXYGEN SATURATION: 94 % | RESPIRATION RATE: 16 BRPM

## 2024-02-06 LAB
ANION GAP SERPL CALCULATED.3IONS-SCNC: 9 MMOL/L (ref 3–16)
BASOPHILS # BLD: 0 K/UL (ref 0–0.2)
BASOPHILS NFR BLD: 0.6 %
BUN SERPL-MCNC: 12 MG/DL (ref 7–20)
CALCIUM SERPL-MCNC: 8.7 MG/DL (ref 8.3–10.6)
CHLORIDE SERPL-SCNC: 106 MMOL/L (ref 99–110)
CO2 SERPL-SCNC: 24 MMOL/L (ref 21–32)
CREAT SERPL-MCNC: 0.7 MG/DL (ref 0.8–1.3)
DEPRECATED RDW RBC AUTO: 15.1 % (ref 12.4–15.4)
EOSINOPHIL # BLD: 0.3 K/UL (ref 0–0.6)
EOSINOPHIL NFR BLD: 4.9 %
GFR SERPLBLD CREATININE-BSD FMLA CKD-EPI: >60 ML/MIN/{1.73_M2}
GLUCOSE SERPL-MCNC: 92 MG/DL (ref 70–99)
HCT VFR BLD AUTO: 36.4 % (ref 40.5–52.5)
HGB BLD-MCNC: 12 G/DL (ref 13.5–17.5)
LYMPHOCYTES # BLD: 1.5 K/UL (ref 1–5.1)
LYMPHOCYTES NFR BLD: 25.4 %
MCH RBC QN AUTO: 29.3 PG (ref 26–34)
MCHC RBC AUTO-ENTMCNC: 32.9 G/DL (ref 31–36)
MCV RBC AUTO: 89.2 FL (ref 80–100)
MONOCYTES # BLD: 0.5 K/UL (ref 0–1.3)
MONOCYTES NFR BLD: 8.4 %
NEUTROPHILS # BLD: 3.6 K/UL (ref 1.7–7.7)
NEUTROPHILS NFR BLD: 60.7 %
PLATELET # BLD AUTO: 144 K/UL (ref 135–450)
PMV BLD AUTO: 7.5 FL (ref 5–10.5)
POTASSIUM SERPL-SCNC: 4.1 MMOL/L (ref 3.5–5.1)
RBC # BLD AUTO: 4.08 M/UL (ref 4.2–5.9)
SODIUM SERPL-SCNC: 139 MMOL/L (ref 136–145)
WBC # BLD AUTO: 5.9 K/UL (ref 4–11)

## 2024-02-06 PROCEDURE — 6370000000 HC RX 637 (ALT 250 FOR IP): Performed by: NURSE PRACTITIONER

## 2024-02-06 PROCEDURE — 85025 COMPLETE CBC W/AUTO DIFF WBC: CPT

## 2024-02-06 PROCEDURE — G0378 HOSPITAL OBSERVATION PER HR: HCPCS

## 2024-02-06 PROCEDURE — 94640 AIRWAY INHALATION TREATMENT: CPT

## 2024-02-06 PROCEDURE — 2580000003 HC RX 258: Performed by: NURSE PRACTITIONER

## 2024-02-06 PROCEDURE — 80048 BASIC METABOLIC PNL TOTAL CA: CPT

## 2024-02-06 RX ADMIN — Medication 2 PUFF: at 08:27

## 2024-02-06 RX ADMIN — CLOPIDOGREL BISULFATE 75 MG: 75 TABLET ORAL at 09:08

## 2024-02-06 RX ADMIN — OXYCODONE 15 MG: 15 TABLET ORAL at 14:38

## 2024-02-06 RX ADMIN — TIOTROPIUM BROMIDE INHALATION SPRAY 2 PUFF: 3.12 SPRAY, METERED RESPIRATORY (INHALATION) at 08:27

## 2024-02-06 RX ADMIN — GABAPENTIN 600 MG: 300 CAPSULE ORAL at 09:08

## 2024-02-06 RX ADMIN — OXYCODONE 15 MG: 15 TABLET ORAL at 04:14

## 2024-02-06 RX ADMIN — GABAPENTIN 600 MG: 300 CAPSULE ORAL at 14:38

## 2024-02-06 RX ADMIN — OXYCODONE 15 MG: 15 TABLET ORAL at 09:08

## 2024-02-06 RX ADMIN — ASPIRIN 81 MG: 81 TABLET, COATED ORAL at 09:08

## 2024-02-06 RX ADMIN — Medication 10 ML: at 09:13

## 2024-02-06 RX ADMIN — TAMSULOSIN HYDROCHLORIDE 0.8 MG: 0.4 CAPSULE ORAL at 09:08

## 2024-02-06 ASSESSMENT — PAIN DESCRIPTION - ORIENTATION: ORIENTATION: POSTERIOR

## 2024-02-06 ASSESSMENT — PAIN SCALES - GENERAL
PAINLEVEL_OUTOF10: 7
PAINLEVEL_OUTOF10: 7
PAINLEVEL_OUTOF10: 6

## 2024-02-06 ASSESSMENT — PAIN DESCRIPTION - LOCATION
LOCATION: BACK
LOCATION: BACK;NECK

## 2024-02-06 ASSESSMENT — PAIN - FUNCTIONAL ASSESSMENT: PAIN_FUNCTIONAL_ASSESSMENT: PREVENTS OR INTERFERES SOME ACTIVE ACTIVITIES AND ADLS

## 2024-02-06 ASSESSMENT — PAIN DESCRIPTION - DESCRIPTORS: DESCRIPTORS: THROBBING

## 2024-02-06 NOTE — CARE COORDINATION
Case Management Assessment  Initial Evaluation    Date/Time of Evaluation: 2/6/2024 11:47 AM  Assessment Completed by: Lia Ricci RN    If patient is discharged prior to next notation, then this note serves as note for discharge by case management.    Patient Name: Ady Hawkins                   YOB: 1955  Diagnosis: Left hand weakness [R29.898]  Peripheral nerve palsy [G58.9]                   Date / Time: 2/5/2024 10:10 AM    Patient Admission Status: Observation   Readmission Risk (Low < 19, Mod (19-27), High > 27): No data recorded  Current PCP: No primary care provider on file.  PCP verified by CM?      Chart Reviewed: Yes      History Provided by:    Patient Orientation:      Patient Cognition:      Hospitalization in the last 30 days (Readmission):  No    If yes, Readmission Assessment in CM Navigator will be completed.    Advance Directives:      Code Status: Full Code   Patient's Primary Decision Maker is: Legal Next of Kin    Primary Decision Maker: Ace Hawkins - Child - [phone]    Discharge Planning:    Patient lives with: Children Type of Home: House  Primary Care Giver:    Patient Support Systems include: Children   Current Financial resources:    Current community resources:    Current services prior to admission: None            Current DME:              Type of Home Care services:  None    ADLS  Prior functional level:    Current functional level:      PT AM-PAC:   /24  OT AM-PAC: 24 /24    Family can provide assistance at DC:    Would you like Case Management to discuss the discharge plan with any other family members/significant others, and if so, who?    Plans to Return to Present Housing:    Other Identified Issues/Barriers to RETURNING to current housing: none  Potential Assistance needed at discharge: N/A            Potential DME:    Patient expects to discharge to: House  Plan for transportation at discharge:      Financial    Payor: [employer] / Plan:

## 2024-02-06 NOTE — PLAN OF CARE
Problem: Pain  Goal: Verbalizes/displays adequate comfort level or baseline comfort level  2/6/2024 1308 by Carol Cadet, RN  Outcome: Progressing  Flowsheets (Taken 2/6/2024 1308)  Verbalizes/displays adequate comfort level or baseline comfort level:   Encourage patient to monitor pain and request assistance   Assess pain using appropriate pain scale   Administer analgesics based on type and severity of pain and evaluate response   Implement non-pharmacological measures as appropriate and evaluate response     Problem: Safety - Adult  Goal: Free from fall injury  2/6/2024 1308 by Carol Cadet, RN  Outcome: Progressing  Flowsheets (Taken 2/6/2024 1308)  Free From Fall Injury:   Based on caregiver fall risk screen, instruct family/caregiver to ask for assistance with transferring infant if caregiver noted to have fall risk factors   Instruct family/caregiver on patient safety

## 2024-02-06 NOTE — DISCHARGE SUMMARY
Hospital Medicine Discharge Summary    Patient: Ady Hawkins   : 1955     Admit Date: 2024   Discharge Date: 2024    Disposition:  [x]Home   []HHC  []SNF  []ECF  []Acute Rehab  []LTAC  []Hospice  Code status:  [x]Full  []DNR/CCA  []Limited (DNR/CCA with Do Not Intubate)  []DNRCC  Condition at Discharge: Stable  Primary Care Provider: No primary care provider on file.    Admitting Provider: Jonathan Aguirre MD  Discharge Provider: TIMBO Louie - CNP     Discharge Diagnoses:      Active Hospital Problems    Diagnosis     Peripheral nerve palsy [G58.9]        Presenting Admission History:      68 y.o. male with a PMH of Tobacco abuse, Cerebral aneurysm, Chronic pain, Parotid mass, and COPD who presented to [facility] with a complaint of left wrist weakness. Patient reports that he woke up at 1230 last night while in his armchair and noted his left hand was not moving appropriately. He went back to bed and symptoms had not changed around 3:30 AM or at 7:30 AM so patient decided to be evaluated. Patient's delay in presentation was due to thinking it may be related to falling asleep with his arms on the armrest. He denies left shoulder or elbow weakness. He denies speech or visual disturbance     In ED, Stroke team consulted-not candidate for TNK. CT head no acute abnormality, CTA head and neck:Patent vascular stent along the cavernous/supraclinoid segment of the right  internal carotid artery, without residual aneurysm.  60% stenosis in the  cavernous portion of the vascular stent. Otherwise, no acute abnormality or flow-limiting stenosis in the remainder of  the major arteries of the head and neck.      Left wrist/hand weakness, radial nerve palsy-Neuro checks, Neurology consulted-Wrist splint prn for comfort and support  Follow-up with neurology in 4 to 6 weeks.  May need EMG if no significant improvement.  Follow up with PCP in 1 wk     Hx of Cerebral aneurysm-on plavix/asa, patient had

## 2024-02-06 NOTE — PROGRESS NOTES
Discharge to home instructions given. Pt verbalized understanding. Pt discharged in stable condition. Pt transported to personal vehicle via wheel chair. All pt belongings given to patient.         
Patient from ED, report from Ciara NOLAN. Four eye assessment completed with Roxy NOLAN. Assessment completed and documented. VSS. A/ox4. Denies pain during assessment. Pt/ot at bedside at this time. Left hand weakness. Ambulates independently without assistive devices. Bed locked and in lowest position. Bedside table and call light within reach. Denies further needs at this time.    
Physical Therapy    Per OT pt is at baseline function and performing all functional mobility independently with no AD. No acute PT needs at this time, signing off. Please re-order if new needs arise. Thank you.     Jessi Warren PT, DPT   
Pt is alert and oriented. VSS. RA. Pt c/o 7/10 pain. Pt given PRN pain medication per MAR. Pt  on the left hand is weak. Right hand  WNL. Pt with bilateral foot drop. Shift assessment completed and documented. Call light within reach. Bed side table within reach. Wheels locked. Bed in lowest position. Bed check in place. Pt instructed to call out for assistance. Pt expressesed understanding & calls out appropritately. All care per orders. Electronically signed by Carol Cadet RN on 2/6/2024 at 1:11 PM    
generalized decreased gross grasp strength in 4th and 5th digits)  Coordination: Within functional limits (L hand with generalized decreased ROM in 4th and 5th digits)  Tone: Abnormal (inability to flex 4th and 5th digits, unless pressure applied to perephrial/ulnar nerve)  Sensation: Intact (pt reports n/t initally however has resolved)  ADL  Feeding: Independent  Grooming: Independent  UE Dressing: Independent  LE Dressing: Independent  Toileting: Independent  Functional Mobility: Independent  Functional Mobility Skilled Clinical Factors: no AD              Vision  Vision: Impaired  Vision Exceptions: Wears glasses for reading  Hearing  Hearing: Within functional limits  Cognition  Overall Cognitive Status: WFL  Orientation  Overall Orientation Status: Within Functional Limits                  Education Given To: Patient  Education Provided: Role of Therapy;Plan of Care  Education Method: Demonstration;Verbal  Barriers to Learning: None  Education Outcome: Verbalized understanding;Demonstrated understanding    AM-PAC - ADL  AM-PAC Daily Activity - Inpatient     AM-PAC Inpatient Daily Activity Raw Score: 24  AM-PAC Inpatient ADL T-Scale Score : 57.54  ADL Inpatient CMS 0-100% Score: 0  ADL Inpatient CMS G-Code Modifier : CH    Goals  Short Term Goals  Time Frame for Short Term Goals: 1x only  Short Term Goal 1: Pt will demo baseline level function for functional mobility and ADLs-- GOAL MET 2/05  Patient Goals   Patient goals : \"to go home\"       Therapy Time   Individual Concurrent Group Co-treatment   Time In 1500         Time Out 1527         Minutes 27         Timed Code Treatment Minutes: 17 Minutes (10 minute eval)       Mora Lima OT

## 2024-02-06 NOTE — PLAN OF CARE
Problem: Pain  Goal: Verbalizes/displays adequate comfort level or baseline comfort level  2/6/2024 1618 by Carol Cadet, RN  Outcome: Completed     Problem: Safety - Adult  Goal: Free from fall injury  2/6/2024 1618 by Carol Cadet, RN  Outcome: Completed

## 2024-02-06 NOTE — CARE COORDINATION
CASE MANAGEMENT DISCHARGE SUMMARY      Discharge to: home with rx for OP therapy     IMM given: OVA    New Durable Medical Equipment ordered/agency: wrist splint    Transportation: pvt       Confirmed discharge plan with:     Patient: yes     RN, name: Padmini Ricci RN

## 2024-02-06 NOTE — PLAN OF CARE
Verbalizes adequate pain relief with oxycodone as ordered. Bed locked and in low position, bedside table and call light within reach, nonskid socks on, up independently in room.  Problem: Pain  Goal: Verbalizes/displays adequate comfort level or baseline comfort level  Outcome: Progressing     Problem: Safety - Adult  Goal: Free from fall injury  Outcome: Progressing

## 2024-02-06 NOTE — DISCHARGE INSTRUCTIONS
Wrist splint prn for comfort and support  Follow-up with neurology in 4 to 6 weeks.  May need EMG if no significant improvement.  Follow up with PCP in 1 wk

## 2024-02-06 NOTE — CONSULTS
Consult Call Back    Who:Chuck Walker MD      Date:2/6/2024,  Time:2:20 PM    Electronically signed by Corrine Todd on 2/6/24 at 2:20 PM EST

## 2024-02-06 NOTE — CONSULTS
Neurology consultation note    Patient name: Ady Hawkins      Chief Complaint:  New onset of left wrist weakness.    History of present illness:  This is a 58 years old right-handed male.  The patient was admitted overnight due to new onset of left wrist weakness.  The patient woke up with the problem.  The patient denies significant numbness or tingling sensation on his left arm.  The patient also denies focal weakness or numbness elsewhere.  The patient has no significant neck pain or radicular pain to his left arm.  CT brain showed no acute finding.  CTA of head and neck showed 60% stenosis of right ICA from previous stent.    Past medical history:    Past Medical History:   Diagnosis Date    COPD (chronic obstructive pulmonary disease) (HCC)        Past surgical history:    Past Surgical History:   Procedure Laterality Date    BRAIN ANEURYSM SURGERY  2022        Medication:    Current Facility-Administered Medications   Medication Dose Route Frequency Provider Last Rate Last Admin    sodium chloride flush 0.9 % injection 5-40 mL  5-40 mL IntraVENous 2 times per day Jimy Mays APRN - CNP   10 mL at 02/06/24 0913    sodium chloride flush 0.9 % injection 5-40 mL  5-40 mL IntraVENous PRN Jimy Mays APRN - CNP        0.9 % sodium chloride infusion   IntraVENous PRN Jimy Mays APRN - CNP        enoxaparin (LOVENOX) injection 40 mg  40 mg SubCUTAneous Daily Jimy Mays APRN - CNP   40 mg at 02/05/24 1645    ondansetron (ZOFRAN-ODT) disintegrating tablet 4 mg  4 mg Oral Q8H PRN Jimy Mays APRN - CNP        Or    ondansetron (ZOFRAN) injection 4 mg  4 mg IntraVENous Q6H PRN Jimy Mays APRN - CNP        polyethylene glycol (GLYCOLAX) packet 17 g  17 g Oral Daily PRN Jimy Mays APRN - CNP        acetaminophen (TYLENOL) tablet 650 mg  650 mg Oral Q6H PRN Jimy Mays APRN - CNP        Or    acetaminophen (TYLENOL) suppository 650 mg  650 mg Rectal Q6H PRN Jimy Mays APRN - CNP        aspirin EC

## 2024-08-24 ENCOUNTER — HOSPITAL ENCOUNTER (INPATIENT)
Age: 69
LOS: 2 days | Discharge: HOME | DRG: 603 | End: 2024-08-26
Attending: EMERGENCY MEDICINE | Admitting: INTERNAL MEDICINE
Payer: OTHER GOVERNMENT

## 2024-08-24 ENCOUNTER — APPOINTMENT (OUTPATIENT)
Dept: GENERAL RADIOLOGY | Age: 69
DRG: 603 | End: 2024-08-24
Payer: OTHER GOVERNMENT

## 2024-08-24 DIAGNOSIS — L08.9 INFECTED WOUND: ICD-10-CM

## 2024-08-24 DIAGNOSIS — R60.0 BILATERAL LEG EDEMA: ICD-10-CM

## 2024-08-24 DIAGNOSIS — T14.8XXA INFECTED WOUND: ICD-10-CM

## 2024-08-24 DIAGNOSIS — L03.116 CELLULITIS OF LEFT LEG: Primary | ICD-10-CM

## 2024-08-24 PROBLEM — L03.90 CELLULITIS: Status: ACTIVE | Noted: 2024-08-24

## 2024-08-24 LAB
ALBUMIN SERPL-MCNC: 3.8 G/DL (ref 3.4–5)
ALBUMIN/GLOB SERPL: 1.2 {RATIO} (ref 1.1–2.2)
ALP SERPL-CCNC: 68 U/L (ref 40–129)
ALT SERPL-CCNC: <5 U/L (ref 10–40)
ANION GAP SERPL CALCULATED.3IONS-SCNC: 9 MMOL/L (ref 3–16)
AST SERPL-CCNC: 14 U/L (ref 15–37)
BASOPHILS # BLD: 0 K/UL (ref 0–0.2)
BASOPHILS NFR BLD: 0.6 %
BILIRUB SERPL-MCNC: 0.3 MG/DL (ref 0–1)
BUN SERPL-MCNC: 15 MG/DL (ref 7–20)
CALCIUM SERPL-MCNC: 8.5 MG/DL (ref 8.3–10.6)
CHLORIDE SERPL-SCNC: 101 MMOL/L (ref 99–110)
CO2 SERPL-SCNC: 25 MMOL/L (ref 21–32)
CREAT SERPL-MCNC: <0.5 MG/DL (ref 0.8–1.3)
CRP SERPL-MCNC: 24.3 MG/L (ref 0–5.1)
DEPRECATED RDW RBC AUTO: 18.1 % (ref 12.4–15.4)
EOSINOPHIL # BLD: 0.2 K/UL (ref 0–0.6)
EOSINOPHIL NFR BLD: 3.1 %
ERYTHROCYTE [SEDIMENTATION RATE] IN BLOOD BY WESTERGREN METHOD: 91 MM/HR (ref 0–20)
GFR SERPLBLD CREATININE-BSD FMLA CKD-EPI: >90 ML/MIN/{1.73_M2}
GLUCOSE SERPL-MCNC: 99 MG/DL (ref 70–99)
HCT VFR BLD AUTO: 28.7 % (ref 40.5–52.5)
HGB BLD-MCNC: 8.8 G/DL (ref 13.5–17.5)
LACTATE BLDV-SCNC: 1.2 MMOL/L (ref 0.4–1.9)
LACTATE BLDV-SCNC: 1.5 MMOL/L (ref 0.4–2)
LACTATE BLDV-SCNC: 2.1 MMOL/L (ref 0.4–1.9)
LYMPHOCYTES # BLD: 1.5 K/UL (ref 1–5.1)
LYMPHOCYTES NFR BLD: 20.6 %
MCH RBC QN AUTO: 22.3 PG (ref 26–34)
MCHC RBC AUTO-ENTMCNC: 30.6 G/DL (ref 31–36)
MCV RBC AUTO: 73 FL (ref 80–100)
MONOCYTES # BLD: 0.5 K/UL (ref 0–1.3)
MONOCYTES NFR BLD: 7.3 %
NEUTROPHILS # BLD: 5.1 K/UL (ref 1.7–7.7)
NEUTROPHILS NFR BLD: 68.4 %
NT-PROBNP SERPL-MCNC: 150 PG/ML (ref 0–124)
PLATELET # BLD AUTO: 184 K/UL (ref 135–450)
PMV BLD AUTO: 6.6 FL (ref 5–10.5)
POTASSIUM SERPL-SCNC: 4.3 MMOL/L (ref 3.5–5.1)
PROT SERPL-MCNC: 7 G/DL (ref 6.4–8.2)
RBC # BLD AUTO: 3.94 M/UL (ref 4.2–5.9)
SODIUM SERPL-SCNC: 135 MMOL/L (ref 136–145)
WBC # BLD AUTO: 7.4 K/UL (ref 4–11)

## 2024-08-24 PROCEDURE — 96365 THER/PROPH/DIAG IV INF INIT: CPT

## 2024-08-24 PROCEDURE — 83605 ASSAY OF LACTIC ACID: CPT

## 2024-08-24 PROCEDURE — 6370000000 HC RX 637 (ALT 250 FOR IP): Performed by: PHYSICIAN ASSISTANT

## 2024-08-24 PROCEDURE — 99285 EMERGENCY DEPT VISIT HI MDM: CPT

## 2024-08-24 PROCEDURE — 90715 TDAP VACCINE 7 YRS/> IM: CPT | Performed by: PHYSICIAN ASSISTANT

## 2024-08-24 PROCEDURE — 6360000002 HC RX W HCPCS: Performed by: PHYSICIAN ASSISTANT

## 2024-08-24 PROCEDURE — 85652 RBC SED RATE AUTOMATED: CPT

## 2024-08-24 PROCEDURE — 94640 AIRWAY INHALATION TREATMENT: CPT

## 2024-08-24 PROCEDURE — 1200000000 HC SEMI PRIVATE

## 2024-08-24 PROCEDURE — 36415 COLL VENOUS BLD VENIPUNCTURE: CPT

## 2024-08-24 PROCEDURE — 2580000003 HC RX 258: Performed by: PHYSICIAN ASSISTANT

## 2024-08-24 PROCEDURE — 86140 C-REACTIVE PROTEIN: CPT

## 2024-08-24 PROCEDURE — 96375 TX/PRO/DX INJ NEW DRUG ADDON: CPT

## 2024-08-24 PROCEDURE — 90471 IMMUNIZATION ADMIN: CPT | Performed by: PHYSICIAN ASSISTANT

## 2024-08-24 PROCEDURE — 80053 COMPREHEN METABOLIC PANEL: CPT

## 2024-08-24 PROCEDURE — 85025 COMPLETE CBC W/AUTO DIFF WBC: CPT

## 2024-08-24 PROCEDURE — 94761 N-INVAS EAR/PLS OXIMETRY MLT: CPT

## 2024-08-24 PROCEDURE — 6370000000 HC RX 637 (ALT 250 FOR IP): Performed by: NURSE PRACTITIONER

## 2024-08-24 PROCEDURE — 73590 X-RAY EXAM OF LOWER LEG: CPT

## 2024-08-24 PROCEDURE — 99223 1ST HOSP IP/OBS HIGH 75: CPT | Performed by: NURSE PRACTITIONER

## 2024-08-24 PROCEDURE — 6360000002 HC RX W HCPCS: Performed by: NURSE PRACTITIONER

## 2024-08-24 PROCEDURE — 2580000003 HC RX 258: Performed by: NURSE PRACTITIONER

## 2024-08-24 PROCEDURE — 83880 ASSAY OF NATRIURETIC PEPTIDE: CPT

## 2024-08-24 RX ORDER — NICOTINE 21 MG/24HR
1 PATCH, TRANSDERMAL 24 HOURS TRANSDERMAL DAILY
Status: DISCONTINUED | OUTPATIENT
Start: 2024-08-24 | End: 2024-08-26 | Stop reason: HOSPADM

## 2024-08-24 RX ORDER — CLOPIDOGREL BISULFATE 75 MG/1
75 TABLET ORAL DAILY
Status: DISCONTINUED | OUTPATIENT
Start: 2024-08-24 | End: 2024-08-26 | Stop reason: HOSPADM

## 2024-08-24 RX ORDER — TRAZODONE HYDROCHLORIDE 50 MG/1
50 TABLET, FILM COATED ORAL NIGHTLY PRN
Status: DISCONTINUED | OUTPATIENT
Start: 2024-08-24 | End: 2024-08-26 | Stop reason: HOSPADM

## 2024-08-24 RX ORDER — ENOXAPARIN SODIUM 100 MG/ML
40 INJECTION SUBCUTANEOUS EVERY 24 HOURS
Status: DISCONTINUED | OUTPATIENT
Start: 2024-08-24 | End: 2024-08-26 | Stop reason: HOSPADM

## 2024-08-24 RX ORDER — POTASSIUM CHLORIDE 7.45 MG/ML
10 INJECTION INTRAVENOUS PRN
Status: DISCONTINUED | OUTPATIENT
Start: 2024-08-24 | End: 2024-08-26 | Stop reason: HOSPADM

## 2024-08-24 RX ORDER — BUDESONIDE AND FORMOTEROL FUMARATE DIHYDRATE 160; 4.5 UG/1; UG/1
2 AEROSOL RESPIRATORY (INHALATION) 2 TIMES DAILY
Status: DISCONTINUED | OUTPATIENT
Start: 2024-08-24 | End: 2024-08-24 | Stop reason: SDUPTHER

## 2024-08-24 RX ORDER — ACETAMINOPHEN 325 MG/1
650 TABLET ORAL EVERY 6 HOURS PRN
Status: DISCONTINUED | OUTPATIENT
Start: 2024-08-24 | End: 2024-08-26 | Stop reason: HOSPADM

## 2024-08-24 RX ORDER — SODIUM CHLORIDE 0.9 % (FLUSH) 0.9 %
5-40 SYRINGE (ML) INJECTION EVERY 12 HOURS SCHEDULED
Status: DISCONTINUED | OUTPATIENT
Start: 2024-08-24 | End: 2024-08-26 | Stop reason: HOSPADM

## 2024-08-24 RX ORDER — ALBUTEROL SULFATE 90 UG/1
1 AEROSOL, METERED RESPIRATORY (INHALATION) EVERY 4 HOURS PRN
Status: DISCONTINUED | OUTPATIENT
Start: 2024-08-24 | End: 2024-08-26 | Stop reason: HOSPADM

## 2024-08-24 RX ORDER — ONDANSETRON 4 MG/1
4 TABLET, ORALLY DISINTEGRATING ORAL EVERY 8 HOURS PRN
Status: DISCONTINUED | OUTPATIENT
Start: 2024-08-24 | End: 2024-08-26 | Stop reason: HOSPADM

## 2024-08-24 RX ORDER — KETOROLAC TROMETHAMINE 15 MG/ML
15 INJECTION, SOLUTION INTRAMUSCULAR; INTRAVENOUS ONCE
Status: COMPLETED | OUTPATIENT
Start: 2024-08-24 | End: 2024-08-24

## 2024-08-24 RX ORDER — OXYCODONE HYDROCHLORIDE 15 MG/1
15 TABLET ORAL EVERY 4 HOURS PRN
Status: DISCONTINUED | OUTPATIENT
Start: 2024-08-24 | End: 2024-08-26 | Stop reason: HOSPADM

## 2024-08-24 RX ORDER — ALBUTEROL SULFATE 90 UG/1
1 AEROSOL, METERED RESPIRATORY (INHALATION) EVERY 6 HOURS PRN
Status: DISCONTINUED | OUTPATIENT
Start: 2024-08-24 | End: 2024-08-24

## 2024-08-24 RX ORDER — BUDESONIDE AND FORMOTEROL FUMARATE DIHYDRATE 160; 4.5 UG/1; UG/1
2 AEROSOL RESPIRATORY (INHALATION)
Status: DISCONTINUED | OUTPATIENT
Start: 2024-08-24 | End: 2024-08-26 | Stop reason: HOSPADM

## 2024-08-24 RX ORDER — ACETAMINOPHEN 650 MG/1
650 SUPPOSITORY RECTAL EVERY 6 HOURS PRN
Status: DISCONTINUED | OUTPATIENT
Start: 2024-08-24 | End: 2024-08-26 | Stop reason: HOSPADM

## 2024-08-24 RX ORDER — ONDANSETRON 2 MG/ML
4 INJECTION INTRAMUSCULAR; INTRAVENOUS EVERY 6 HOURS PRN
Status: DISCONTINUED | OUTPATIENT
Start: 2024-08-24 | End: 2024-08-26 | Stop reason: HOSPADM

## 2024-08-24 RX ORDER — ASPIRIN 81 MG/1
81 TABLET ORAL DAILY
Status: DISCONTINUED | OUTPATIENT
Start: 2024-08-24 | End: 2024-08-26 | Stop reason: HOSPADM

## 2024-08-24 RX ORDER — SODIUM CHLORIDE 9 MG/ML
INJECTION, SOLUTION INTRAVENOUS PRN
Status: DISCONTINUED | OUTPATIENT
Start: 2024-08-24 | End: 2024-08-26 | Stop reason: HOSPADM

## 2024-08-24 RX ORDER — SODIUM CHLORIDE 0.9 % (FLUSH) 0.9 %
5-40 SYRINGE (ML) INJECTION PRN
Status: DISCONTINUED | OUTPATIENT
Start: 2024-08-24 | End: 2024-08-26 | Stop reason: HOSPADM

## 2024-08-24 RX ORDER — FUROSEMIDE 10 MG/ML
20 INJECTION INTRAMUSCULAR; INTRAVENOUS ONCE
Status: COMPLETED | OUTPATIENT
Start: 2024-08-24 | End: 2024-08-24

## 2024-08-24 RX ORDER — POLYETHYLENE GLYCOL 3350 17 G/17G
17 POWDER, FOR SOLUTION ORAL DAILY PRN
Status: DISCONTINUED | OUTPATIENT
Start: 2024-08-24 | End: 2024-08-26 | Stop reason: HOSPADM

## 2024-08-24 RX ORDER — POTASSIUM CHLORIDE 1500 MG/1
40 TABLET, EXTENDED RELEASE ORAL PRN
Status: DISCONTINUED | OUTPATIENT
Start: 2024-08-24 | End: 2024-08-26 | Stop reason: HOSPADM

## 2024-08-24 RX ORDER — MAGNESIUM SULFATE IN WATER 40 MG/ML
2000 INJECTION, SOLUTION INTRAVENOUS PRN
Status: DISCONTINUED | OUTPATIENT
Start: 2024-08-24 | End: 2024-08-26 | Stop reason: HOSPADM

## 2024-08-24 RX ADMIN — ENOXAPARIN SODIUM 40 MG: 100 INJECTION SUBCUTANEOUS at 21:03

## 2024-08-24 RX ADMIN — KETOROLAC TROMETHAMINE 15 MG: 15 INJECTION, SOLUTION INTRAMUSCULAR; INTRAVENOUS at 15:54

## 2024-08-24 RX ADMIN — OXYCODONE 15 MG: 15 TABLET ORAL at 19:16

## 2024-08-24 RX ADMIN — BUDESONIDE AND FORMOTEROL FUMARATE DIHYDRATE 2 PUFF: 160; 4.5 AEROSOL RESPIRATORY (INHALATION) at 19:13

## 2024-08-24 RX ADMIN — TETANUS TOXOID, REDUCED DIPHTHERIA TOXOID AND ACELLULAR PERTUSSIS VACCINE, ADSORBED 0.5 ML: 5; 2.5; 8; 8; 2.5 SUSPENSION INTRAMUSCULAR at 15:53

## 2024-08-24 RX ADMIN — VANCOMYCIN HYDROCHLORIDE 1000 MG: 1 INJECTION, POWDER, LYOPHILIZED, FOR SOLUTION INTRAVENOUS at 16:48

## 2024-08-24 RX ADMIN — FUROSEMIDE 20 MG: 10 INJECTION, SOLUTION INTRAMUSCULAR; INTRAVENOUS at 15:59

## 2024-08-24 RX ADMIN — PIPERACILLIN AND TAZOBACTAM 3375 MG: 3; .375 INJECTION, POWDER, LYOPHILIZED, FOR SOLUTION INTRAVENOUS at 16:02

## 2024-08-24 RX ADMIN — SODIUM CHLORIDE, PRESERVATIVE FREE 10 ML: 5 INJECTION INTRAVENOUS at 21:00

## 2024-08-24 ASSESSMENT — PAIN DESCRIPTION - ORIENTATION
ORIENTATION: MID
ORIENTATION: LEFT

## 2024-08-24 ASSESSMENT — PAIN - FUNCTIONAL ASSESSMENT
PAIN_FUNCTIONAL_ASSESSMENT: ACTIVITIES ARE NOT PREVENTED
PAIN_FUNCTIONAL_ASSESSMENT: 0-10
PAIN_FUNCTIONAL_ASSESSMENT: PREVENTS OR INTERFERES SOME ACTIVE ACTIVITIES AND ADLS

## 2024-08-24 ASSESSMENT — PAIN DESCRIPTION - LOCATION
LOCATION: LEG
LOCATION: NECK

## 2024-08-24 ASSESSMENT — PAIN DESCRIPTION - ONSET
ONSET: ON-GOING
ONSET: GRADUAL

## 2024-08-24 ASSESSMENT — PAIN DESCRIPTION - DIRECTION: RADIATING_TOWARDS: DENIES

## 2024-08-24 ASSESSMENT — PAIN SCALES - GENERAL
PAINLEVEL_OUTOF10: 4
PAINLEVEL_OUTOF10: 5
PAINLEVEL_OUTOF10: 6
PAINLEVEL_OUTOF10: 5
PAINLEVEL_OUTOF10: 4
PAINLEVEL_OUTOF10: 5
PAINLEVEL_OUTOF10: 5

## 2024-08-24 ASSESSMENT — PAIN DESCRIPTION - DESCRIPTORS
DESCRIPTORS: BURNING
DESCRIPTORS: ACHING

## 2024-08-24 ASSESSMENT — PAIN DESCRIPTION - PAIN TYPE
TYPE: ACUTE PAIN
TYPE: CHRONIC PAIN

## 2024-08-24 ASSESSMENT — PAIN DESCRIPTION - FREQUENCY
FREQUENCY: CONTINUOUS
FREQUENCY: INTERMITTENT

## 2024-08-24 NOTE — PROGRESS NOTES
Patient provided a COPD Educational Folder that includes the following materials:     [x]  [employer] Booklet: Managing your COPD  [x]  ALA: Getting the Most Out of Medication Delivery Devices  [x]  ALA: My COPD Action Plan  [x]  Better Breathers [facility]: [facility]   [x]  Smoking Cessation Classes  [x]  Outpatient Spiritual Care Services  [x]  Magnet: Signs of COPD    PATIENT/CAREGIVER TEACHING:   Level of patient/caregiver understanding able to:   [x] Verbalize understanding   [] Demonstrate understanding       [] Teach back        [] Needs reinforcement     []  Other:     Electronically signed by Mitzi Gallegos RN (Mandy) on 8/24/2024 at 6:47 PM

## 2024-08-24 NOTE — ED PROVIDER NOTES
[facility] ED     EMERGENCY DEPARTMENT ENCOUNTER     Location: [facility] ED  8/24/2024  Note Started: 3:30 PM EDT 8/24/24      Patient Identification  Ady Hawkins is a 69 y.o. male      HPI:Ady Hawkins was evaluated in the Emergency Department for \"leg swelling.  \"Patient states he has chronic lower extremity deformities from remote injury.  He was noted increasing swelling/weeping as well as developing ulceration of left anterior lower leg after hitting it against a piece of wood several days ago.  He also reports increasing redness specifically of the left lower leg.  No fevers, chills, or sweats.  Pain is rated as 5/10. Although initial history and physical exam information was obtained by SUKHJINDER/NPP/MD/DO (who also dictated a record of this visit), I personally saw the patient and performed and made/approved the management plan and take responsibility for the patient management.      PHYSICAL EXAM:  General: No acute distress.  Head: Normocephalic/atraumatic.  Heart: Regular rhythm.  Normal S1-S2.  Lungs were clear to auscultation bilaterally.  No wheezing, rales, rhonchi.  Extremities: 3+ bilateral lower extremity edema with chronic deformities noted.  Active weeping noted with 2 cm ulceration/ of the anterior aspect of the left lower leg.    Patient seen and evaluated.  Relevant records reviewed.  MDM    Presents to the emergency department with increasing swelling/redness and weeping of left greater than right lower extremity.      I independently interpreted the following studies: CBC with noted white count of 7.4.  Hemoglobin 8.8.  Mild hyponatremia noted with otherwise stable electrolytes and renal function.  Liver function within normal limits.  Lactic acid is stable.  Sed rate/CRP are quite elevated.  X-rays without evidence of obvious osteomyelitis.      CLINICAL IMPRESSION  1. Cellulitis of left leg    2. Infected wound    3. Bilateral leg edema

## 2024-08-24 NOTE — PROGRESS NOTES
4 Eyes Skin Assessment     NAME:  Ady Hawkins  YOB: 1955  MEDICAL RECORD NUMBER:  [id number]    The patient is being assessed for  Admission    I agree that at least one RN has performed a thorough Head to Toe Skin Assessment on the patient. ALL assessment sites listed below have been assessed.      Areas assessed by both nurses:    Head, Face, Ears, Shoulders, Back, Chest, Arms, Elbows, Hands, Legs. Feet and Heels, and Under Medical Devices         Does the Patient have a Wound? Yes wound(s) were present on assessment. LDA wound assessment was Initiated and completed by RN    Wound on left lower leg, bilateral lower leg edema and redness, scattered bruising and abrasions, pt stated no wounds in talia area.       Paco Prevention initiated by RN: Yes  Wound Care Orders initiated by RN: No    Pressure Injury (Stage 3,4, Unstageable, DTI, NWPT, and Complex wounds) if present, place Wound referral order by RN under : No    New Ostomies, if present place, Ostomy referral order under : No     Nurse 1 eSignature: Electronically signed by Mitzi Gallegos RN (Mandy) on 8/24/24 at 6:08 PM EDT    **SHARE this note so that the co-signing nurse can place an eSignature**    Nurse 2 eSignature: Electronically signed by Alyson Burgos RN on 8/24/24 at 6:22 PM EDT

## 2024-08-24 NOTE — PROGRESS NOTES
Handoff report and transfer of care given at bedside to yuval .  Patient in stable condition, denies needs/concerns at this time.  Call light within reach.
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
## 2024-08-24 NOTE — FLOWSHEET NOTE
08/24/24 1800   Vital Signs   Temp 98 °F (36.7 °C)   Temp Source Oral   Pulse 77   Heart Rate Source Monitor   Respirations 18   /64   MAP (Calculated) 81   BP Location Left upper arm   BP Method Automatic   Patient Position Semi fowlers   Pain Assessment   Pain Assessment 0-10   Pain Level 5   Pain Location Neck   Opioid-Induced Sedation   POSS Score 1   RASS   Wesley Agitation Sedation Scale (RASS) 0   Oxygen Therapy   SpO2 98 %   O2 Device None (Room air)     Admission questions complete, home medications have been verified, and a head-to-toe assessment has been completed. Patient has been orientated to the unit and the room. Call light is in reach and has been explained. No further needs noted at this time. Will continue to monitor.

## 2024-08-24 NOTE — CONSULTS
[facility]   Pharmacy Pharmacokinetic Monitoring Service - Vancomycin     Ady Hawkins is a 69 y.o. male starting on vancomycin therapy for cellulitis. Pharmacy consulted by Jennifer Haile NP for monitoring and adjustment.    Target Concentration: Goal AUC/WINSTON 400-600 mg*hr/L    Additional Antimicrobials: Zosyn    Pertinent Laboratory Values:   Wt Readings from Last 1 Encounters:   08/24/24 71.2 kg (157 lb)     Temp Readings from Last 1 Encounters:   08/24/24 98 °F (36.7 °C) (Oral)     Estimated Creatinine Clearance: 138 mL/min (based on SCr of 0.5 mg/dL).  Recent Labs     08/24/24  1455   CREATININE <0.5*   BUN 15   WBC 7.4     Plan:  Dosing recommendations based on Bayesian software  Start vancomycin 1500 mg every 12 hours (received 1000 mg dose in ER)  Anticipated AUC of 429 and trough concentration of 9.9 at steady state  Renal labs as indicated   Vancomycin concentration ordered for 8/25 @ 1400   Pharmacy will continue to monitor patient and adjust therapy as indicated    Thank you for the consult,  Tushar Zurita RPH  8/24/2024 7:19 PM
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
## 2024-08-25 LAB
ANION GAP SERPL CALCULATED.3IONS-SCNC: 10 MMOL/L (ref 3–16)
BASOPHILS # BLD: 0 K/UL (ref 0–0.2)
BASOPHILS NFR BLD: 0.5 %
BUN SERPL-MCNC: 13 MG/DL (ref 7–20)
CALCIUM SERPL-MCNC: 8.1 MG/DL (ref 8.3–10.6)
CHLORIDE SERPL-SCNC: 107 MMOL/L (ref 99–110)
CO2 SERPL-SCNC: 22 MMOL/L (ref 21–32)
CREAT SERPL-MCNC: 0.6 MG/DL (ref 0.8–1.3)
DEPRECATED RDW RBC AUTO: 18.1 % (ref 12.4–15.4)
EOSINOPHIL # BLD: 0.3 K/UL (ref 0–0.6)
EOSINOPHIL NFR BLD: 4.2 %
FERRITIN SERPL IA-MCNC: 18.2 NG/ML (ref 30–400)
GFR SERPLBLD CREATININE-BSD FMLA CKD-EPI: >90 ML/MIN/{1.73_M2}
GLUCOSE SERPL-MCNC: 84 MG/DL (ref 70–99)
HCT VFR BLD AUTO: 26.2 % (ref 40.5–52.5)
HGB BLD-MCNC: 8.1 G/DL (ref 13.5–17.5)
IRON SATN MFR SERPL: 4 % (ref 20–50)
IRON SERPL-MCNC: 15 UG/DL (ref 59–158)
LYMPHOCYTES # BLD: 1 K/UL (ref 1–5.1)
LYMPHOCYTES NFR BLD: 17.2 %
MCH RBC QN AUTO: 22.6 PG (ref 26–34)
MCHC RBC AUTO-ENTMCNC: 31 G/DL (ref 31–36)
MCV RBC AUTO: 72.7 FL (ref 80–100)
MONOCYTES # BLD: 0.6 K/UL (ref 0–1.3)
MONOCYTES NFR BLD: 9.1 %
NEUTROPHILS # BLD: 4.2 K/UL (ref 1.7–7.7)
NEUTROPHILS NFR BLD: 69 %
PLATELET # BLD AUTO: 161 K/UL (ref 135–450)
PMV BLD AUTO: 7 FL (ref 5–10.5)
POTASSIUM SERPL-SCNC: 3.7 MMOL/L (ref 3.5–5.1)
RBC # BLD AUTO: 3.61 M/UL (ref 4.2–5.9)
SODIUM SERPL-SCNC: 139 MMOL/L (ref 136–145)
TIBC SERPL-MCNC: 360 UG/DL (ref 260–445)
TRANSFERRIN SERPL-MCNC: 277 MG/DL (ref 200–360)
VANCOMYCIN TROUGH SERPL-MCNC: 11.6 UG/ML (ref 10–20)
WBC # BLD AUTO: 6.1 K/UL (ref 4–11)

## 2024-08-25 PROCEDURE — 6370000000 HC RX 637 (ALT 250 FOR IP): Performed by: NURSE PRACTITIONER

## 2024-08-25 PROCEDURE — 36415 COLL VENOUS BLD VENIPUNCTURE: CPT

## 2024-08-25 PROCEDURE — 6360000002 HC RX W HCPCS: Performed by: INTERNAL MEDICINE

## 2024-08-25 PROCEDURE — 2580000003 HC RX 258: Performed by: NURSE PRACTITIONER

## 2024-08-25 PROCEDURE — 80048 BASIC METABOLIC PNL TOTAL CA: CPT

## 2024-08-25 PROCEDURE — 85025 COMPLETE CBC W/AUTO DIFF WBC: CPT

## 2024-08-25 PROCEDURE — 2580000003 HC RX 258: Performed by: INTERNAL MEDICINE

## 2024-08-25 PROCEDURE — 94640 AIRWAY INHALATION TREATMENT: CPT

## 2024-08-25 PROCEDURE — 94761 N-INVAS EAR/PLS OXIMETRY MLT: CPT

## 2024-08-25 PROCEDURE — 6360000002 HC RX W HCPCS: Performed by: NURSE PRACTITIONER

## 2024-08-25 PROCEDURE — 82728 ASSAY OF FERRITIN: CPT

## 2024-08-25 PROCEDURE — 99232 SBSQ HOSP IP/OBS MODERATE 35: CPT | Performed by: INTERNAL MEDICINE

## 2024-08-25 PROCEDURE — 80202 ASSAY OF VANCOMYCIN: CPT

## 2024-08-25 PROCEDURE — 83540 ASSAY OF IRON: CPT

## 2024-08-25 PROCEDURE — 6370000000 HC RX 637 (ALT 250 FOR IP): Performed by: PHYSICIAN ASSISTANT

## 2024-08-25 PROCEDURE — 1200000000 HC SEMI PRIVATE

## 2024-08-25 PROCEDURE — 84466 ASSAY OF TRANSFERRIN: CPT

## 2024-08-25 RX ADMIN — PIPERACILLIN AND TAZOBACTAM 3375 MG: 3; .375 INJECTION, POWDER, LYOPHILIZED, FOR SOLUTION INTRAVENOUS at 00:28

## 2024-08-25 RX ADMIN — VANCOMYCIN HYDROCHLORIDE 1500 MG: 10 INJECTION, POWDER, LYOPHILIZED, FOR SOLUTION INTRAVENOUS at 03:33

## 2024-08-25 RX ADMIN — ASPIRIN 81 MG: 81 TABLET, COATED ORAL at 08:43

## 2024-08-25 RX ADMIN — TIOTROPIUM BROMIDE INHALATION SPRAY 2 PUFF: 3.12 SPRAY, METERED RESPIRATORY (INHALATION) at 07:34

## 2024-08-25 RX ADMIN — CEFEPIME 2000 MG: 2 INJECTION, POWDER, FOR SOLUTION INTRAVENOUS at 10:46

## 2024-08-25 RX ADMIN — PIPERACILLIN AND TAZOBACTAM 3375 MG: 3; .375 INJECTION, POWDER, LYOPHILIZED, FOR SOLUTION INTRAVENOUS at 08:43

## 2024-08-25 RX ADMIN — ENOXAPARIN SODIUM 40 MG: 100 INJECTION SUBCUTANEOUS at 20:33

## 2024-08-25 RX ADMIN — OXYCODONE 15 MG: 15 TABLET ORAL at 05:25

## 2024-08-25 RX ADMIN — SODIUM CHLORIDE, PRESERVATIVE FREE 10 ML: 5 INJECTION INTRAVENOUS at 20:33

## 2024-08-25 RX ADMIN — OXYCODONE 15 MG: 15 TABLET ORAL at 22:47

## 2024-08-25 RX ADMIN — CLOPIDOGREL BISULFATE 75 MG: 75 TABLET ORAL at 08:43

## 2024-08-25 RX ADMIN — CEFEPIME 2000 MG: 2 INJECTION, POWDER, FOR SOLUTION INTRAVENOUS at 22:44

## 2024-08-25 RX ADMIN — OXYCODONE 15 MG: 15 TABLET ORAL at 10:03

## 2024-08-25 RX ADMIN — TRAZODONE HYDROCHLORIDE 50 MG: 50 TABLET ORAL at 20:33

## 2024-08-25 RX ADMIN — SODIUM CHLORIDE, PRESERVATIVE FREE 10 ML: 5 INJECTION INTRAVENOUS at 08:44

## 2024-08-25 RX ADMIN — OXYCODONE 15 MG: 15 TABLET ORAL at 18:03

## 2024-08-25 RX ADMIN — OXYCODONE 15 MG: 15 TABLET ORAL at 00:32

## 2024-08-25 RX ADMIN — VANCOMYCIN HYDROCHLORIDE 1250 MG: 10 INJECTION, POWDER, LYOPHILIZED, FOR SOLUTION INTRAVENOUS at 15:03

## 2024-08-25 RX ADMIN — BUDESONIDE AND FORMOTEROL FUMARATE DIHYDRATE 2 PUFF: 160; 4.5 AEROSOL RESPIRATORY (INHALATION) at 19:10

## 2024-08-25 RX ADMIN — BUDESONIDE AND FORMOTEROL FUMARATE DIHYDRATE 2 PUFF: 160; 4.5 AEROSOL RESPIRATORY (INHALATION) at 07:34

## 2024-08-25 RX ADMIN — OXYCODONE 15 MG: 15 TABLET ORAL at 13:57

## 2024-08-25 ASSESSMENT — PAIN DESCRIPTION - PAIN TYPE
TYPE: CHRONIC PAIN
TYPE: CHRONIC PAIN
TYPE: ACUTE PAIN;CHRONIC PAIN
TYPE: CHRONIC PAIN
TYPE: CHRONIC PAIN

## 2024-08-25 ASSESSMENT — PAIN SCALES - GENERAL
PAINLEVEL_OUTOF10: 6
PAINLEVEL_OUTOF10: 4
PAINLEVEL_OUTOF10: 6
PAINLEVEL_OUTOF10: 4
PAINLEVEL_OUTOF10: 6
PAINLEVEL_OUTOF10: 8
PAINLEVEL_OUTOF10: 6

## 2024-08-25 ASSESSMENT — PAIN DESCRIPTION - LOCATION
LOCATION: NECK
LOCATION: NECK;BACK

## 2024-08-25 ASSESSMENT — PAIN DESCRIPTION - DESCRIPTORS
DESCRIPTORS: ACHING
DESCRIPTORS: ACHING
DESCRIPTORS: SHARP
DESCRIPTORS: ACHING

## 2024-08-25 ASSESSMENT — PAIN - FUNCTIONAL ASSESSMENT
PAIN_FUNCTIONAL_ASSESSMENT: ACTIVITIES ARE NOT PREVENTED
PAIN_FUNCTIONAL_ASSESSMENT: PREVENTS OR INTERFERES SOME ACTIVE ACTIVITIES AND ADLS
PAIN_FUNCTIONAL_ASSESSMENT: ACTIVITIES ARE NOT PREVENTED
PAIN_FUNCTIONAL_ASSESSMENT: ACTIVITIES ARE NOT PREVENTED

## 2024-08-25 ASSESSMENT — PAIN DESCRIPTION - ONSET
ONSET: AWAKENED FROM SLEEP

## 2024-08-25 ASSESSMENT — PAIN DESCRIPTION - DIRECTION
RADIATING_TOWARDS: DENIES

## 2024-08-25 ASSESSMENT — PAIN DESCRIPTION - FREQUENCY
FREQUENCY: CONTINUOUS

## 2024-08-25 ASSESSMENT — PAIN DESCRIPTION - ORIENTATION
ORIENTATION: MID
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
## 2024-08-25 NOTE — PROGRESS NOTES
Handoff report and transfer of care given at bedside to christine .  Patient in stable condition, denies needs/concerns at this time.  Call light within reach.

## 2024-08-25 NOTE — FLOWSHEET NOTE
08/24/24 2054   Vital Signs   Temp 98 °F (36.7 °C)   Temp Source Oral   Pulse 76   Heart Rate Source Monitor   Respirations 17   BP 97/62   MAP (Calculated) 74   BP Location Left upper arm   BP Method Automatic   Patient Position Semi fowlers   Pain Assessment   Pain Assessment 0-10   Pain Level 4   Patient's Stated Pain Goal 0 - No pain   Pain Location Neck   Pain Orientation Mid   Pain Descriptors Aching   Functional Pain Assessment Activities are not prevented   Pain Type Acute pain   Pain Radiating Towards denies   Pain Frequency Intermittent   Pain Onset Gradual   Non-Pharmaceutical Pain Intervention(s) Declines   Care Plan - Pain Goals   Verbalizes/displays adequate comfort level or baseline comfort level Encourage patient to monitor pain and request assistance;Assess pain using appropriate pain scale   Opioid-Induced Sedation   POSS Score 1   RASS   Wesley Agitation Sedation Scale (RASS) 0   Oxygen Therapy   SpO2 97 %   O2 Device None (Room air)     PM assessment completed. With complaints of pain on his neck, PRN pain med is not due at this time, patient understood, declined non-pharmaceutical intervention. No signs or symptoms of distress noted. Patient tolerated PM medications well. Respirations easy and even. Bed in lowest position, bed alarm in place and functioning properly, bed rails x2 up,  Call light within reach.     Bedside Mobility Assessment Tool (BMAT):     Assessment Level 1- Sit and Shake    1. From a semi-reclined position, ask patient to sit up and rotate to a seated position at the side of the bed. Can use the bedrail.    2. Ask patient to reach out and grab your hand and shake making sure patient reaches across his/her midline.   Pass- Patient is able to come to a seated position, maintain core strength. Maintains seated balance while reaching across midline. Move on to Assessment Level 2.     Assessment Level 2- Stretch and Point   1. With patient in seated position at the side of  the bed, have patient place both feet on the floor (or stool) with knees no higher than hips.    2. Ask patient to stretch one leg and straighten the knee, then bend the ankle/flex and point the toes. If appropriate, repeat with the other leg.   Pass- Patient is able to demonstrate appropriate quad strength on intended weight bearing limb(s). Move onto Assessment Level 3.     Assessment Level 3- Stand   1. Ask patient to elevate off the bed or chair (seated to standing) using an assistive device (cane, bedrail).    2. Patient should be able to raise buttocks off be and hold for a count of five. May repeat once.   Pass- Patient maintains standing stability for at least 5 seconds, proceed to assessment level 4.    Assessment Level 4- Walk   1. Ask patient to march in place at bedside.    2. Then ask patient to advance step and return each foot. Some medical conditions may render a patient from stepping backwards, use your best clinical judgement.   Pass- Patient demonstrates balance while shifting weight and ability to step, takes independent steps, does not use assistive device patient is MOBILITY LEVEL 4.      Mobility Level- 4

## 2024-08-25 NOTE — PROGRESS NOTES
IV catheter discontinued intact. Site without signs and symptoms of complications. Dressing and pressure applied.

## 2024-08-25 NOTE — PLAN OF CARE
Problem: Discharge Planning  Goal: Discharge to home or other facility with appropriate resources  Outcome: Progressing     Problem: Pain  Goal: Verbalizes/displays adequate comfort level or baseline comfort level  Outcome: Progressing  Flowsheets  Taken 8/25/2024 0524 by Fran Evangelista RN  Verbalizes/displays adequate comfort level or baseline comfort level:   Encourage patient to monitor pain and request assistance   Assess pain using appropriate pain scale  Taken 8/25/2024 0334 by Fran Evaneglista RN  Verbalizes/displays adequate comfort level or baseline comfort level:   Encourage patient to monitor pain and request assistance   Assess pain using appropriate pain scale     Problem: ABCDS Injury Assessment  Goal: Absence of physical injury  Outcome: Progressing     Problem: Safety - Adult  Goal: Free from fall injury  Outcome: Progressing

## 2024-08-25 NOTE — FLOWSHEET NOTE
08/25/24 0725   Handoff   Communication Given Shift Handoff   Handoff Given To Ciara NOLAN   Handoff Received From Chandni NOLAN   Handoff Communication Face to Face;At bedside   Time Handoff Given 0725   End of Shift Check Performed Yes     Pt in bed with eyes closed.  No signs of distress noted.  Call light within reach.

## 2024-08-25 NOTE — PROGRESS NOTES
Patient provided a COPD Educational Folder that includes the following materials:     [x]  [employer] Booklet: Managing your COPD  [x]  ALA: Getting the Most Out of Medication Delivery Devices  [x]  ALA: My COPD Action Plan  [x]  Better Breathers Club: [facility]   [x]  Smoking Cessation Classes  [x]  Outpatient Spiritual Care Services  [x]  Magnet: Signs of COPD    PATIENT/CAREGIVER TEACHING:   Level of patient/caregiver understanding able to:   [x] Verbalize understanding   [] Demonstrate understanding       [] Teach back        [] Needs reinforcement     []  Other:     Electronically signed by Mitzi Gallegos RN (Mandy) on 8/25/2024 at 4:04 PM

## 2024-08-25 NOTE — CONSULTS
Consult Note     Patient: Ady Hawkins MRN: [id number]   YOB: 1955 Age: 69 y.o. Sex: male   Unit: [address] SURG Room/Bed: 0211/0211-02 Location: [facility]    Admitting Physician: FLORIDA PORRAS    Primary Care Physician: No primary care provider on file.   Admission Date: 8/24/2024   Consult Date: 8/25/2024     Assessment/Plan:  PING, new onset  Left lower extremity cellulitis  History of cerebral aneurysm status post coiling, on aspirin and Plavix  COPD    1.  Egd and colonoscopy, as an outpatient, off clopidogrel for 7 days after clearance obtained from his neurologist    Subjective:    History of Present Illness: This is a 69-year-old man who was admitted with left lower extremity cellulitis.  He says that he tripped over some dennis wire more than a week ago injuring his left leg.  At the time he says he filled issue with blood.  He presented to the emergency department yesterday as the leg developed increased swelling and pain.    Incidentally, the patient was noted to have a microcytic anemia.  Previous blood counts from February 2024 show a near normal hemoglobin with normal Red cell indices.  Now his hemoglobin is stably 8-9 with an MCV of 72.  Other than the above injury denies any blood loss, GI or otherwise.  He denies any ongoing GI or abdominal symptoms.  Denies any weight loss or constitutional symptoms.    He says that he gets his care at the [facility].  His last colonoscopy may have been 10 years ago at a hospital [facility] [address]      Past Medical History:   Diagnosis Date    COPD (chronic obstructive pulmonary disease) ([facility])      Past Surgical History:   Procedure Laterality Date    BRAIN ANEURYSM SURGERY  2022     Allergies   Allergen Reactions    Hydromorphone      Other reaction(s): Urticaria    Morphine      Other reaction(s): Dysuria, Headache, Retention of urine    Ciprofloxacin Rash and Hives     Other reaction(s): Eruption of skin, Urticaria  and Imaging    Reviewed      Signed By: Spenser Huffman MD   August 25, 2024

## 2024-08-25 NOTE — PROGRESS NOTES
Admit: 2024    Name:  Ady Hawkins  Room:  [facility] [place]/0211-02  MRN:    [id number]    Daily Progress Note for 2024   Admitted with left lower extremity wound and redness  Interval History:   Denies complaints  Scheduled Meds:   nicotine  1 patch TransDERmal Daily    aspirin EC  81 mg Oral Daily    clopidogrel  75 mg Oral Daily    budesonide-formoterol  2 puff Inhalation BID RT    tiotropium  2 puff Inhalation Daily RT    sodium chloride flush  5-40 mL IntraVENous 2 times per day    enoxaparin  40 mg SubCUTAneous Q24H    piperacillin-tazobactam  3,375 mg IntraVENous Q8H    vancomycin (VANCOCIN) IV  1,500 mg IntraVENous Q12H       Continuous Infusions:   sodium chloride         PRN Meds:  traZODone, sodium chloride flush, sodium chloride, potassium chloride **OR** potassium alternative oral replacement **OR** potassium chloride, magnesium sulfate, ondansetron **OR** ondansetron, polyethylene glycol, acetaminophen **OR** acetaminophen, oxyCODONE, albuterol sulfate HFA                  Objective:     Temp  Av.1 °F (36.7 °C)  Min: 98 °F (36.7 °C)  Max: 98.2 °F (36.8 °C)  Pulse  Av.7  Min: 66  Max: 90  BP  Min: 97/62  Max: 138/68  SpO2  Av.8 %  Min: 92 %  Max: 99 %  Patient Vitals for the past 4 hrs:   BP Temp Temp src Pulse Resp SpO2   24 0830 138/68 98.1 °F (36.7 °C) Oral 73 18 92 %   24 0754 -- -- -- -- -- 97 %         Intake/Output Summary (Last 24 hours) at 2024 1017  Last data filed at 2024 0841  Gross per 24 hour   Intake 610 ml   Output 2000 ml   Net -1390 ml       Physical Exam:    General appearance:  No apparent distress, appears stated age and cooperative.  HEENT:  Pupils equal, round, and reactive to light. Conjunctivae/corneas clear.  Respiratory:  Normal respiratory effort. Clear to auscultation bilaterally without Rales/Wheezes/Rhonchi.  Cardiovascular:  Regular rate and rhythm with normal S1/S2 without murmurs, rubs or gallops.  Abdomen:  Soft, non-tender,

## 2024-08-25 NOTE — PLAN OF CARE
Problem: Discharge Planning  Goal: Discharge to home or other facility with appropriate resources  8/25/2024 0155 by Fran Evangelista RN  Outcome: Progressing  Flowsheets (Taken 8/24/2024 2059)  Discharge to home or other facility with appropriate resources: Identify barriers to discharge with patient and caregiver     Problem: Pain  Goal: Verbalizes/displays adequate comfort level or baseline comfort level  8/25/2024 0155 by Fran Evangelista RN  Outcome: Progressing  Flowsheets  Taken 8/25/2024 0030  Verbalizes/displays adequate comfort level or baseline comfort level:   Encourage patient to monitor pain and request assistance   Assess pain using appropriate pain scale  Taken 8/24/2024 2054  Verbalizes/displays adequate comfort level or baseline comfort level:   Encourage patient to monitor pain and request assistance   Assess pain using appropriate pain scale     Problem: ABCDS Injury Assessment  Goal: Absence of physical injury  8/25/2024 0155 by Fran Evangelista RN  Outcome: Progressing     Problem: Safety - Adult  Goal: Free from fall injury  Outcome: Progressing

## 2024-08-25 NOTE — FLOWSHEET NOTE
08/25/24 0830   Vital Signs   Temp 98.1 °F (36.7 °C)   Temp Source Oral   Pulse 73   Heart Rate Source Monitor   Respirations 18   /68   MAP (Calculated) 91   BP Location Left upper arm   BP Method Automatic   Patient Position High fowlers   Pain Assessment   Pain Assessment 0-10   Pain Level 6   Opioid-Induced Sedation   POSS Score 1   RASS   Wesley Agitation Sedation Scale (RASS) 0   Oxygen Therapy   SpO2 92 %   O2 Device None (Room air)     Alert and oriented, skin w/d, respirs e/e unlabored, VSS, meds given, nurse assessment completed, call light and overbed table within easy reach, no other needs at this time, see flowsheet and MAR. See flowsheet and MAR. Mitzi Gallegos RN (Mandy)

## 2024-08-26 VITALS
OXYGEN SATURATION: 97 % | HEIGHT: 70 IN | WEIGHT: 157 LBS | HEART RATE: 80 BPM | DIASTOLIC BLOOD PRESSURE: 71 MMHG | RESPIRATION RATE: 18 BRPM | SYSTOLIC BLOOD PRESSURE: 95 MMHG | TEMPERATURE: 97.9 F | BODY MASS INDEX: 22.48 KG/M2

## 2024-08-26 PROBLEM — D50.9 IRON DEFICIENCY ANEMIA: Status: ACTIVE | Noted: 2024-08-26

## 2024-08-26 PROBLEM — T14.8XXA INFECTED WOUND: Status: ACTIVE | Noted: 2024-08-26

## 2024-08-26 PROBLEM — L08.9 INFECTED WOUND: Status: ACTIVE | Noted: 2024-08-26

## 2024-08-26 LAB
ANION GAP SERPL CALCULATED.3IONS-SCNC: 10 MMOL/L (ref 3–16)
BASOPHILS # BLD: 0.1 K/UL (ref 0–0.2)
BASOPHILS NFR BLD: 0.8 %
BUN SERPL-MCNC: 10 MG/DL (ref 7–20)
CALCIUM SERPL-MCNC: 8.3 MG/DL (ref 8.3–10.6)
CHLORIDE SERPL-SCNC: 105 MMOL/L (ref 99–110)
CO2 SERPL-SCNC: 23 MMOL/L (ref 21–32)
CREAT SERPL-MCNC: <0.5 MG/DL (ref 0.8–1.3)
DEPRECATED RDW RBC AUTO: 18.6 % (ref 12.4–15.4)
EOSINOPHIL # BLD: 0.1 K/UL (ref 0–0.6)
EOSINOPHIL NFR BLD: 2.1 %
GFR SERPLBLD CREATININE-BSD FMLA CKD-EPI: >90 ML/MIN/{1.73_M2}
GLUCOSE SERPL-MCNC: 97 MG/DL (ref 70–99)
HCT VFR BLD AUTO: 28.1 % (ref 40.5–52.5)
HGB BLD-MCNC: 8.7 G/DL (ref 13.5–17.5)
LYMPHOCYTES # BLD: 1.6 K/UL (ref 1–5.1)
LYMPHOCYTES NFR BLD: 25 %
MCH RBC QN AUTO: 22.6 PG (ref 26–34)
MCHC RBC AUTO-ENTMCNC: 30.9 G/DL (ref 31–36)
MCV RBC AUTO: 73.2 FL (ref 80–100)
MONOCYTES # BLD: 0.5 K/UL (ref 0–1.3)
MONOCYTES NFR BLD: 8.2 %
NEUTROPHILS # BLD: 4.2 K/UL (ref 1.7–7.7)
NEUTROPHILS NFR BLD: 63.9 %
PLATELET # BLD AUTO: 167 K/UL (ref 135–450)
PMV BLD AUTO: 6.8 FL (ref 5–10.5)
POTASSIUM SERPL-SCNC: 3.8 MMOL/L (ref 3.5–5.1)
RBC # BLD AUTO: 3.84 M/UL (ref 4.2–5.9)
SODIUM SERPL-SCNC: 138 MMOL/L (ref 136–145)
WBC # BLD AUTO: 6.6 K/UL (ref 4–11)

## 2024-08-26 PROCEDURE — 94640 AIRWAY INHALATION TREATMENT: CPT

## 2024-08-26 PROCEDURE — 6360000002 HC RX W HCPCS: Performed by: NURSE PRACTITIONER

## 2024-08-26 PROCEDURE — 94761 N-INVAS EAR/PLS OXIMETRY MLT: CPT

## 2024-08-26 PROCEDURE — 6370000000 HC RX 637 (ALT 250 FOR IP): Performed by: NURSE PRACTITIONER

## 2024-08-26 PROCEDURE — 80048 BASIC METABOLIC PNL TOTAL CA: CPT

## 2024-08-26 PROCEDURE — 99239 HOSP IP/OBS DSCHRG MGMT >30: CPT | Performed by: INTERNAL MEDICINE

## 2024-08-26 PROCEDURE — 2580000003 HC RX 258: Performed by: INTERNAL MEDICINE

## 2024-08-26 PROCEDURE — 6360000002 HC RX W HCPCS: Performed by: INTERNAL MEDICINE

## 2024-08-26 PROCEDURE — 85025 COMPLETE CBC W/AUTO DIFF WBC: CPT

## 2024-08-26 PROCEDURE — 2580000003 HC RX 258: Performed by: NURSE PRACTITIONER

## 2024-08-26 PROCEDURE — 36415 COLL VENOUS BLD VENIPUNCTURE: CPT

## 2024-08-26 PROCEDURE — 6370000000 HC RX 637 (ALT 250 FOR IP): Performed by: PHYSICIAN ASSISTANT

## 2024-08-26 RX ORDER — CEFUROXIME AXETIL 500 MG/1
500 TABLET ORAL 2 TIMES DAILY
Qty: 14 TABLET | Refills: 0 | Status: SHIPPED | OUTPATIENT
Start: 2024-08-26 | End: 2024-09-02

## 2024-08-26 RX ORDER — FERROUS SULFATE 325(65) MG
325 TABLET ORAL 2 TIMES DAILY WITH MEALS
Qty: 60 TABLET | Refills: 1 | Status: SHIPPED | OUTPATIENT
Start: 2024-08-26

## 2024-08-26 RX ORDER — FERROUS SULFATE 325(65) MG
325 TABLET ORAL 2 TIMES DAILY WITH MEALS
Status: DISCONTINUED | OUTPATIENT
Start: 2024-08-26 | End: 2024-08-26 | Stop reason: HOSPADM

## 2024-08-26 RX ADMIN — VANCOMYCIN HYDROCHLORIDE 1250 MG: 10 INJECTION, POWDER, LYOPHILIZED, FOR SOLUTION INTRAVENOUS at 03:07

## 2024-08-26 RX ADMIN — OXYCODONE 15 MG: 15 TABLET ORAL at 07:38

## 2024-08-26 RX ADMIN — OXYCODONE 15 MG: 15 TABLET ORAL at 12:02

## 2024-08-26 RX ADMIN — OXYCODONE 15 MG: 15 TABLET ORAL at 03:10

## 2024-08-26 RX ADMIN — CLOPIDOGREL BISULFATE 75 MG: 75 TABLET ORAL at 07:38

## 2024-08-26 RX ADMIN — TIOTROPIUM BROMIDE INHALATION SPRAY 2 PUFF: 3.12 SPRAY, METERED RESPIRATORY (INHALATION) at 07:47

## 2024-08-26 RX ADMIN — CEFEPIME 2000 MG: 2 INJECTION, POWDER, FOR SOLUTION INTRAVENOUS at 09:47

## 2024-08-26 RX ADMIN — BUDESONIDE AND FORMOTEROL FUMARATE DIHYDRATE 2 PUFF: 160; 4.5 AEROSOL RESPIRATORY (INHALATION) at 07:47

## 2024-08-26 RX ADMIN — ASPIRIN 81 MG: 81 TABLET, COATED ORAL at 07:38

## 2024-08-26 ASSESSMENT — PAIN DESCRIPTION - PAIN TYPE
TYPE: CHRONIC PAIN
TYPE: CHRONIC PAIN

## 2024-08-26 ASSESSMENT — PAIN - FUNCTIONAL ASSESSMENT: PAIN_FUNCTIONAL_ASSESSMENT: PREVENTS OR INTERFERES SOME ACTIVE ACTIVITIES AND ADLS

## 2024-08-26 ASSESSMENT — PAIN DESCRIPTION - DESCRIPTORS: DESCRIPTORS: SHARP

## 2024-08-26 ASSESSMENT — PAIN SCALES - GENERAL
PAINLEVEL_OUTOF10: 4
PAINLEVEL_OUTOF10: 7
PAINLEVEL_OUTOF10: 6
PAINLEVEL_OUTOF10: 6

## 2024-08-26 ASSESSMENT — PAIN DESCRIPTION - LOCATION
LOCATION: NECK
LOCATION: NECK
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
## 2024-08-26 NOTE — PROGRESS NOTES
Telemetry removed for discharge    IV catheter discontinued intact. Site without signs and symptoms of complications. Dressing and pressure applied.

## 2024-08-26 NOTE — PROGRESS NOTES
IV removed and discharge instructions completed. All questions were answered to patients satisfaction.   Request for transport placed, all personal belongs packed. No further needs noted.

## 2024-08-26 NOTE — ACP (ADVANCE CARE PLANNING)
Advance Care Planning   General Advance Care Planning (ACP) Conversation    Date of Conversation: 8/24/2024  Conducted with: Patient with Decision Making Capacity  Other persons present: none    Healthcare Decision Maker:    Primary Decision Maker: Ace Hawkins - Child - [phone]      Content/Action Overview:    Pt elects Full code    Patsy Salguero RN

## 2024-08-26 NOTE — PROGRESS NOTES
Vancomycin Day: 2  Current Regimen: 1250 mg IV every 12 hours    Patient's labs, cultures, vitals, and vancomycin regimen reviewed.   SCr stable  U/O not measured/well documented  [employer] updated    Plan:   Order level for 8/27 (1400)  Heike Preston Pharm D 8/26/202412:20 PM  .

## 2024-08-26 NOTE — PLAN OF CARE
Problem: Discharge Planning  Goal: Discharge to home or other facility with appropriate resources  8/26/2024 1435 by Mitzi Gallegos RN  Outcome: Adequate for Discharge  8/26/2024 1046 by Mitzi Gallegos RN  Outcome: Progressing     Problem: Pain  Goal: Verbalizes/displays adequate comfort level or baseline comfort level  8/26/2024 1435 by Mitzi Gallegos RN  Outcome: Adequate for Discharge  8/26/2024 1046 by Mitzi Gallegos RN  Outcome: Progressing     Problem: ABCDS Injury Assessment  Goal: Absence of physical injury  8/26/2024 1435 by Mitzi Gallegos RN  Outcome: Adequate for Discharge  8/26/2024 1046 by Mitzi Gallegos RN  Outcome: Progressing     Problem: Safety - Adult  Goal: Free from fall injury  8/26/2024 1435 by Mitzi Gallegos RN  Outcome: Adequate for Discharge  8/26/2024 1046 by Mitzi Gallegos RN  Outcome: Progressing

## 2024-08-26 NOTE — PROGRESS NOTES
Patient provided a COPD Educational Folder that includes the following materials:     [x]  [employer] Booklet: Managing your COPD  [x]  ALA: Getting the Most Out of Medication Delivery Devices  [x]  ALA: My COPD Action Plan  [x]  Better Breathers Club: [facility]   [x]  Smoking Cessation Classes  [x]  Outpatient Spiritual Care Services  [x]  Magnet: Signs of COPD    PATIENT/CAREGIVER TEACHING:   Level of patient/caregiver understanding able to:   [x] Verbalize understanding   [] Demonstrate understanding       [] Teach back        [] Needs reinforcement     []  Other:     Electronically signed by Mitzi Gallegos RN (Mandy) on 8/26/2024 at 10:48 AM

## 2024-08-26 NOTE — CARE COORDINATION
Case Management Assessment  Initial Evaluation and Discharge Note    Date/Time of Evaluation: 8/26/2024 10:55 AM  Assessment Completed by: Patsy Salguero RN    If patient is discharged prior to next notation, then this note serves as note for discharge by case management.    Patient Name: Ady Hawkins                   YOB: 1955  Diagnosis: Infected wound [T14.8XXA, L08.9]  Cellulitis [L03.90]  Bilateral leg edema [R60.0]  Cellulitis of left leg [L03.116]                   Date / Time: 8/24/2024  2:14 PM    Patient Admission Status: Inpatient   Readmission Risk (Low < 19, Mod (19-27), High > 27): Readmission Risk Score: 9.6    Current PCP: No primary care provider on file.  PCP verified by CM? Yes    Chart Reviewed: Yes      History Provided by: Patient  Patient Orientation: Alert and Oriented    Patient Cognition: Alert    Hospitalization in the last 30 days (Readmission):  No    If yes, Readmission Assessment in CM Navigator will be completed.    Advance Directives:      Code Status: Full Code   Patient's Primary Decision Maker is: Legal Next of Kin    Primary Decision Maker: Ace Hawkins - Child - [phone]    Discharge Planning:    Patient lives with: Alone Type of Home: Apartment  Primary Care Giver: Self  Patient Support Systems include: Children   Current Financial resources: Medicare  Current community resources: None  Current services prior to admission: None            Current DME:              Type of Home Care services:  None    ADLS  Prior functional level: Independent in ADLs/IADLs  Current functional level: Independent in ADLs/IADLs    PT AM-PAC:   /24  OT AM-PAC:   /24    Family can provide assistance at DC: No  Would you like Case Management to discuss the discharge plan with any other family members/significant others, and if so, who? Yes (son,Russ)  Plans to Return to Present Housing: Yes  Other Identified Issues/Barriers to RETURNING to current housing:

## 2024-08-26 NOTE — FLOWSHEET NOTE
08/26/24 0738   Vital Signs   Temp 97.6 °F (36.4 °C)   Temp Source Oral   Pulse 72   Heart Rate Source Monitor   Respirations 18   /64   MAP (Calculated) 82   BP Location Left upper arm   BP Method Automatic   Patient Position Semi fowlers   Pain Assessment   Pain Assessment 0-10   Pain Level 6   Opioid-Induced Sedation   POSS Score 1   RASS   Wesley Agitation Sedation Scale (RASS) 0   Oxygen Therapy   SpO2 96 %   O2 Device None (Room air)     Patient alert and oriented, skin w/d, respirs e/e unlabored, VSS,meds given, nurse assessment completed, call light and overbed table within easy reach, no other needs at this time, see flowsheet and MAR. Mitzi Gallegos RN (Mandy)

## 2024-08-26 NOTE — DISCHARGE INSTR - DIET

## 2024-08-26 NOTE — PROGRESS NOTES
Progress Note    Patient Ady Hawkins  MRN: [id number]  YOB: 1955 Age: 69 y.o. Sex: male  Room: [address] [place] [address]       Admitting Physician: Lindsay Rodriguez DO   Date of Admission: 8/24/2024  2:14 PM   Primary Care Physician: No primary care provider on file.     Subjective:  Ady Hawkins was seen and examined. We are following for iron deficiency anemia.  -- Denies abdominal pain, nausea, vomiting. Tolerating a regular diet.     ROS:  Constitutional: Denies fever, no change in appetite  Respiratory: Denies cough or shortness of breath  Cardiovascular: Denies chest pain or edema    Objective:  Vital Signs:   Vitals:    08/26/24 0749   BP:    Pulse:    Resp:    Temp:    SpO2: 96%         Physical Exam:  Constitutional: Alert and oriented x 4. No acute distress.   Respiratory: Respirations nonlabored, no crepitus  GI: Abdomen nondistended, soft, and nontender.    Neurological: No focal deficits noted. No asterixis.    Intake/Output:    Intake/Output Summary (Last 24 hours) at 8/26/2024 0916  Last data filed at 8/26/2024 0744  Gross per 24 hour   Intake 1196 ml   Output 1700 ml   Net -504 ml        Current Medications:  Current Facility-Administered Medications   Medication Dose Route Frequency Provider Last Rate Last Admin    ceFEPIme (MAXIPIME) 2,000 mg in sodium chloride 0.9 % 100 mL IVPB (mini-bag)  2,000 mg IntraVENous Q12H Delmi Rodriguez MD   Stopped at 08/25/24 2314    vancomycin (VANCOCIN) 1,250 mg in sodium chloride 0.9 % 250 mL IVPB  1,250 mg IntraVENous Q12H Jennifer Haile APRN - CNP   Stopped at 08/26/24 0437    nicotine (NICODERM CQ) 21 MG/24HR 1 patch  1 patch TransDERmal Daily Mary Anne Mayer PA-C   1 patch at 08/26/24 0738    aspirin EC tablet 81 mg  81 mg Oral Daily Jennifer Haile APRN - CNP   81 mg at 08/26/24 0738    clopidogrel (PLAVIX) tablet 75 mg  75 mg Oral Daily Jennifer Haile APRN - CNP   75 mg at 08/26/24 0738    budesonide-formoterol  08/26/24 0738    albuterol sulfate HFA (PROVENTIL;VENTOLIN;PROAIR) 108 (90 Base) MCG/ACT inhaler 1 puff  1 puff Inhalation Q4H PRN Lindsay Rodriguez DO             Recent Imaging:   XR TIBIA FIBULA LEFT (2 VIEWS)  Narrative: EXAMINATION:  XRAY VIEWS OF THE LEFT TIBIA AND FIBULA    8/24/2024 2:38 pm    COMPARISON:  None.    HISTORY:  ORDERING SYSTEM PROVIDED HISTORY: wound with cellulitis, r/o osteomyelitis  TECHNOLOGIST PROVIDED HISTORY:  Reason for exam:->wound with cellulitis, r/o osteomyelitis  Reason for Exam: wound with severe cellulitis, rule out osteomyelitis    FINDINGS:  There is no acute fracture.  The bones are slightly demineralized.  There are  old healed fractures involving the tibia and fibula status post  reconstruction plate and screws transfixing the tibial plateau, distal tibia  and fibula.  Calcifications involve the mid syndesmosis.  Degenerative  changes involve the knee and foot.  Soft tissue swelling surrounds the lower  extremity.  Impression: 1. No acute osseous abnormality.       Labs:   Recent Labs     08/24/24  1455 08/25/24  0537 08/26/24  0527   HGB 8.8* 8.1* 8.7*   WBC 7.4 6.1 6.6   ALKPHOS 68  --   --    ALT <5*  --   --    AST 14*  --   --    BILITOT 0.3  --   --           Assessment:  -Iron deficiency anemia unspecified - Hgb is currently stable  -Cerebral aneurysm s/p coiling. On antiplatelet therapy  -COPD    Plan:  EGD and colonoscopy are recommended for evaluation for iron deficiency anemia. Plavix will need to be held prior to procedure.       Avril Camilo MD    [facility]    [phone]. Also available via [employer]

## 2024-08-26 NOTE — DISCHARGE INSTR - ACTIVITY
Your information:  Name: Ady Hawkins  : 1955    Your instructions:    Follow up with your PCP, neurology, podiatry  Take medications as prescribed    What to do after you leave the hospital:    Recommended diet: regular diet    Recommended activity: activity as tolerated        The following personal items were collected during your admission and were returned to you:    Belongings  Dental Appliances: Uppers  Vision - Corrective Lenses: Eyeglasses, Sent home  Hearing Aid: None  Clothing: Undergarments, Socks, Pants, Shirt, Footwear  Jewelry: None  Electronic Devices: Cell Phone  Weapons (Notify Protective Services/Security): None  Home Medications: None  Valuables Given To: Other (Comment) (n/a)  Provide Name(s) of Who Valuable(s) Were Given To: n/a    Information obtained by:  By signing below, I understand that if any problems occur once I leave the hospital I am to contact MD.  I understand and acknowledge receipt of the instructions indicated above.

## 2024-08-26 NOTE — PLAN OF CARE
Problem: Discharge Planning  Goal: Discharge to home or other facility with appropriate resources  8/26/2024 1046 by Mitzi Gallegos RN  Outcome: Progressing  8/25/2024 2152 by Milvia Faith, RN  Outcome: Progressing     Problem: Pain  Goal: Verbalizes/displays adequate comfort level or baseline comfort level  8/26/2024 1046 by Mitzi Gallegos RN  Outcome: Progressing  8/25/2024 2152 by Milvia Faith, RN  Outcome: Progressing     Problem: ABCDS Injury Assessment  Goal: Absence of physical injury  8/26/2024 1046 by Mitzi Gallegos RN  Outcome: Progressing  8/25/2024 2152 by Milvia Faith, RN  Outcome: Progressing     Problem: Safety - Adult  Goal: Free from fall injury  8/26/2024 1046 by Mitzi Gallegos RN  Outcome: Progressing  8/25/2024 2152 by Milvia Faith, RN  Outcome: Progressing

## 2024-08-26 NOTE — PLAN OF CARE
Problem: Discharge Planning  Goal: Discharge to home or other facility with appropriate resources  8/25/2024 2152 by Milvia Faith RN  Outcome: Progressing  8/25/2024 1559 by Mitzi Gallegos RN  Outcome: Progressing     Problem: Pain  Goal: Verbalizes/displays adequate comfort level or baseline comfort level  8/25/2024 2152 by Milvia Faith RN  Outcome: Progressing  8/25/2024 1559 by Mitzi Gallegos RN  Outcome: Progressing  Flowsheets  Taken 8/25/2024 0524 by Fran Evangelista RN  Verbalizes/displays adequate comfort level or baseline comfort level:   Encourage patient to monitor pain and request assistance   Assess pain using appropriate pain scale  Taken 8/25/2024 0334 by Fran Evangelista RN  Verbalizes/displays adequate comfort level or baseline comfort level:   Encourage patient to monitor pain and request assistance   Assess pain using appropriate pain scale     Problem: ABCDS Injury Assessment  Goal: Absence of physical injury  8/25/2024 2152 by Milvia Faith RN  Outcome: Progressing  8/25/2024 1559 by Mitzi Gallegos RN  Outcome: Progressing     Problem: Safety - Adult  Goal: Free from fall injury  8/25/2024 2152 by Milvia Faith RN  Outcome: Progressing  8/25/2024 1559 by Mitzi Gallegos RN  Outcome: Progressing